# Patient Record
Sex: FEMALE | Race: BLACK OR AFRICAN AMERICAN | NOT HISPANIC OR LATINO | Employment: FULL TIME | URBAN - NONMETROPOLITAN AREA
[De-identification: names, ages, dates, MRNs, and addresses within clinical notes are randomized per-mention and may not be internally consistent; named-entity substitution may affect disease eponyms.]

---

## 2017-01-27 ENCOUNTER — TELEPHONE (OUTPATIENT)
Dept: FAMILY MEDICINE CLINIC | Facility: CLINIC | Age: 32
End: 2017-01-27

## 2017-01-27 ENCOUNTER — OFFICE VISIT (OUTPATIENT)
Dept: FAMILY MEDICINE CLINIC | Facility: CLINIC | Age: 32
End: 2017-01-27

## 2017-01-27 ENCOUNTER — LAB (OUTPATIENT)
Dept: LAB | Facility: HOSPITAL | Age: 32
End: 2017-01-27

## 2017-01-27 VITALS
SYSTOLIC BLOOD PRESSURE: 120 MMHG | BODY MASS INDEX: 22.5 KG/M2 | WEIGHT: 140 LBS | DIASTOLIC BLOOD PRESSURE: 74 MMHG | HEIGHT: 66 IN

## 2017-01-27 DIAGNOSIS — Z34.90 PREGNANCY, UNSPECIFIED GESTATIONAL AGE: Primary | ICD-10-CM

## 2017-01-27 DIAGNOSIS — N92.6 MISSED MENSES: Primary | ICD-10-CM

## 2017-01-27 DIAGNOSIS — F41.9 ANXIETY: ICD-10-CM

## 2017-01-27 DIAGNOSIS — N92.6 MISSED MENSES: ICD-10-CM

## 2017-01-27 DIAGNOSIS — M79.7 PRIMARY FIBROMYALGIA: ICD-10-CM

## 2017-01-27 LAB — B-HCG UR QL: POSITIVE

## 2017-01-27 PROCEDURE — 99213 OFFICE O/P EST LOW 20 MIN: CPT | Performed by: FAMILY MEDICINE

## 2017-01-27 PROCEDURE — 81025 URINE PREGNANCY TEST: CPT

## 2017-01-27 NOTE — MR AVS SNAPSHOT
Ligia Hay   1/27/2017 10:30 AM   Office Visit    Dept Phone:  [phone]   Encounter #:  [id number]    Provider:  Ezequiel Aguilar MD   Department:  [facility] FAMILY MEDICINE                Your Full Care Plan              Your Updated Medication List          This list is accurate as of: 1/27/17 11:12 AM.  Always use your most recent med list.                clonazePAM 0.5 MG tablet   Commonly known as:  KlonoPIN   Take 1 tablet by mouth 2 (Two) Times a Day As Needed for seizures. Do not take while driving or operating machinery       cyclobenzaprine 10 MG tablet   Commonly known as:  FLEXERIL   Take 1 tablet by mouth every night.       diclofenac 50 MG EC tablet   Commonly known as:  VOLTAREN       FLUoxetine 20 MG capsule   Commonly known as:  PROzac   Take 1 capsule by mouth daily.       guaiFENesin 600 MG 12 hr tablet   Commonly known as:  MUCINEX   Take 2 tablets by mouth 2 (two) times a day.       pantoprazole 40 MG EC tablet   Commonly known as:  PROTONIX   Take 1 tablet by mouth daily.       pregabalin 150 MG capsule   Commonly known as:  LYRICA   Take 1 capsule by mouth 2 (Two) Times a Day.       PRENATAL VITAMINS PO               You Were Diagnosed With        Codes Comments    Missed menses    -  Primary ICD-10-CM: N92.6  ICD-9-CM: 626.4     Primary fibromyalgia     ICD-10-CM: M79.7  ICD-9-CM: 729.1     Anxiety     ICD-10-CM: F41.9  ICD-9-CM: 300.00       Instructions     None    Patient Instructions History      Upcoming Appointments     Visit Type Date Time Department    OFFICE VISIT 1/27/2017 10:30 AM [facility] FAM MED [facility] 4TH    OFFICE VISIT 1/31/2017 10:30 AM [facility] GASTROENT  MAD    OFFICE VISIT 4/28/2017  2:45 PM [facility] FAM [facility] 4TH      MyChart Signup     Our records indicate that you have declined [facility] signup. If you would like to sign up for [facility], please email [email or web address] or call [phone] to obtain an  "activation code.             Other Info from Your Visit           Your Appointments     Jan 31, 2017 10:30 AM CST   Office Visit with CATHERINE Maguire   [facility] GASTROENTEROLOGY (--)    [address]  [address] [facility] [address] [phone]           Arrive 15 minutes prior to appointment.            Apr 28, 2017  2:45 PM CDT   Office Visit with Ezequiel Aguilra MD   [facility] FAMILY MEDICINE (--)    [address]  [address] [place] [id number] [phone]           Arrive 15 minutes prior to appointment.              Allergies     Adhesive Tape      Fish-derived Products        Reason for Visit     Fibromyalgia fibromyalgia      Vital Signs     Blood Pressure Height Weight Body Mass Index Smoking Status       120/74 66\" (167.6 cm) 140 lb (63.5 kg) 22.6 kg/m2 Current Every Day Smoker       Problems and Diagnoses Noted     Anxiety problem    Primary fibromyalgia    Missed menses    -  Primary        "

## 2017-01-27 NOTE — PROGRESS NOTES
She is pregnant.  Have her stop all of her current medicines and DC them from the med list.  We'll refer her to an obstetrician.

## 2017-01-27 NOTE — PROGRESS NOTES
Subjective   Ligia Hay is a 31 y.o. female.     Fibromyalgia   This is a chronic problem. The current episode started more than 1 year ago. The problem has been waxing and waning. Associated symptoms include coughing. Pertinent negatives include no chest pain.   Anxiety   Presents for follow-up visit. Symptoms include irritability, malaise and shortness of breath. Patient reports no chest pain, depressed mood, excessive worry, hyperventilation, insomnia, nervous/anxious behavior, palpitations or panic. Symptoms occur constantly.            The following portions of the patient's history were reviewed and updated as appropriate: allergies, current medications, past family history, past medical history, past social history, past surgical history and problem list.    Review of Systems   Constitutional: Positive for irritability.   Respiratory: Positive for cough, shortness of breath and wheezing.    Cardiovascular: Negative for chest pain and palpitations.   Genitourinary: Positive for frequency. Negative for dysuria. Menstrual problem: late on period.   Psychiatric/Behavioral: The patient is not nervous/anxious and does not have insomnia.        Objective   Physical Exam   Constitutional: She is oriented to person, place, and time. She appears well-developed and well-nourished. No distress.   HENT:   Head: Normocephalic and atraumatic.   Neurological: She is alert and oriented to person, place, and time.   Skin: Skin is warm and dry. She is not diaphoretic.   Psychiatric: She has a normal mood and affect. Her speech is normal and behavior is normal. Judgment and thought content normal. Her mood appears not anxious. Her affect is not angry, not blunt, not labile and not inappropriate. Cognition and memory are normal. She does not exhibit a depressed mood.   Nursing note and vitals reviewed.      Assessment/Plan   Problems Addressed this Visit        Musculoskeletal and Integument    Primary fibromyalgia        Other    Anxiety      Other Visit Diagnoses     Missed menses    -  Primary    Relevant Orders    Pregnancy, Urine

## 2017-01-28 NOTE — PROGRESS NOTES
RESULTS & RECOMMENDATIONS RELAYED, Referral Sent, Med List Cleared except for Prenatal Vitamins.  Patient was advised to stop all current medications.

## 2017-01-28 NOTE — TELEPHONE ENCOUNTER
-Test results & recommendations relayed.   Referral sent to OB/GYN  Med List Cleared.    ---- Message from Ezequiel Aguilar MD sent at 1/27/2017  3:23 PM CST -----  She is pregnant.  Have her stop all of her current medicines and DC them from the med list.  We'll refer her to an obstetrician.

## 2017-02-07 ENCOUNTER — INITIAL PRENATAL (OUTPATIENT)
Dept: OBSTETRICS AND GYNECOLOGY | Facility: CLINIC | Age: 32
End: 2017-02-07

## 2017-02-07 ENCOUNTER — APPOINTMENT (OUTPATIENT)
Dept: LAB | Facility: HOSPITAL | Age: 32
End: 2017-02-07

## 2017-02-07 VITALS — SYSTOLIC BLOOD PRESSURE: 123 MMHG | DIASTOLIC BLOOD PRESSURE: 80 MMHG | BODY MASS INDEX: 22.6 KG/M2 | WEIGHT: 140 LBS

## 2017-02-07 DIAGNOSIS — F41.9 ANXIETY IN PREGNANCY IN FIRST TRIMESTER, ANTEPARTUM: ICD-10-CM

## 2017-02-07 DIAGNOSIS — Z3A.00 WEEKS OF GESTATION OF PREGNANCY NOT SPECIFIED: ICD-10-CM

## 2017-02-07 DIAGNOSIS — Z32.01 PREGNANCY EXAMINATION OR TEST, POSITIVE RESULT: Primary | ICD-10-CM

## 2017-02-07 DIAGNOSIS — O99.341 DEPRESSION AFFECTING PREGNANCY IN FIRST TRIMESTER, ANTEPARTUM: ICD-10-CM

## 2017-02-07 DIAGNOSIS — F32.A DEPRESSION AFFECTING PREGNANCY IN FIRST TRIMESTER, ANTEPARTUM: ICD-10-CM

## 2017-02-07 DIAGNOSIS — Z34.01 ENCOUNTER FOR SUPERVISION OF NORMAL FIRST PREGNANCY IN FIRST TRIMESTER: ICD-10-CM

## 2017-02-07 DIAGNOSIS — O99.341 ANXIETY IN PREGNANCY IN FIRST TRIMESTER, ANTEPARTUM: ICD-10-CM

## 2017-02-07 LAB
ABO GROUP BLD: NORMAL
AMPHET+METHAMPHET UR QL: NEGATIVE
BARBITURATES UR QL SCN: NEGATIVE
BASOPHILS # BLD AUTO: 0.02 10*3/MM3 (ref 0–0.2)
BASOPHILS NFR BLD AUTO: 0.2 % (ref 0–2)
BENZODIAZ UR QL SCN: NEGATIVE
BILIRUB UR QL STRIP: NEGATIVE
BLD GP AB SCN SERPL QL: NEGATIVE
CANDIDA ALBICANS: POSITIVE
CANNABINOIDS SERPL QL: NEGATIVE
CLARITY UR: CLEAR
COCAINE UR QL: NEGATIVE
COLOR UR: YELLOW
DEPRECATED RDW RBC AUTO: 39.1 FL (ref 36.4–46.3)
EOSINOPHIL # BLD AUTO: 0.11 10*3/MM3 (ref 0–0.7)
EOSINOPHIL NFR BLD AUTO: 1.1 % (ref 0–7)
ERYTHROCYTE [DISTWIDTH] IN BLOOD BY AUTOMATED COUNT: 11.7 % (ref 11.5–14.5)
GARDNERELLA VAGINALIS: NEGATIVE
GLUCOSE UR STRIP-MCNC: NEGATIVE MG/DL
HBV SURFACE AG SERPL QL IA: NEGATIVE
HCT VFR BLD AUTO: 36.2 % (ref 35–45)
HCV AB SER DONR QL: NEGATIVE
HCV S/C RATIO: 0.02 (ref 0–0.89)
HGB BLD-MCNC: 12.8 G/DL (ref 12–15.5)
HGB UR QL STRIP.AUTO: NEGATIVE
HIV1+2 AB SER QL: NEGATIVE
IMM GRANULOCYTES # BLD: 0.02 10*3/MM3 (ref 0–0.02)
IMM GRANULOCYTES NFR BLD: 0.2 % (ref 0–0.5)
KETONES UR QL STRIP: NEGATIVE
LEUKOCYTE ESTERASE UR QL STRIP.AUTO: NEGATIVE
LYMPHOCYTES # BLD AUTO: 3.53 10*3/MM3 (ref 0.6–4.2)
LYMPHOCYTES NFR BLD AUTO: 34.2 % (ref 10–50)
MCH RBC QN AUTO: 32 PG (ref 26.5–34)
MCHC RBC AUTO-ENTMCNC: 35.4 G/DL (ref 31.4–36)
MCV RBC AUTO: 90.5 FL (ref 80–98)
METHADONE UR QL SCN: NEGATIVE
MONOCYTES # BLD AUTO: 0.71 10*3/MM3 (ref 0–0.9)
MONOCYTES NFR BLD AUTO: 6.9 % (ref 0–12)
NEUTROPHILS # BLD AUTO: 5.94 10*3/MM3 (ref 2–8.6)
NEUTROPHILS NFR BLD AUTO: 57.4 % (ref 37–80)
NITRITE UR QL STRIP: NEGATIVE
OPIATES UR QL: NEGATIVE
OXYCODONE UR QL SCN: NEGATIVE
PH UR STRIP.AUTO: 7 [PH] (ref 5–9)
PLATELET # BLD AUTO: 301 10*3/MM3 (ref 150–450)
PMV BLD AUTO: 10.1 FL (ref 8–12)
PROT UR QL STRIP: NEGATIVE
RBC # BLD AUTO: 4 10*6/MM3 (ref 3.77–5.16)
RH BLD: POSITIVE
RUBV IGG SER QL: ABNORMAL
RUBV IGG SER-ACNC: 308 IU/ML (ref 0–9.9)
SP GR UR STRIP: 1.01 (ref 1–1.03)
TRICHOMONAS VAGINALIS PCR: NEGATIVE
UROBILINOGEN UR QL STRIP: NORMAL
WBC NRBC COR # BLD: 10.33 10*3/MM3 (ref 3.2–9.8)

## 2017-02-07 PROCEDURE — 87086 URINE CULTURE/COLONY COUNT: CPT | Performed by: ADVANCED PRACTICE MIDWIFE

## 2017-02-07 PROCEDURE — 36415 COLL VENOUS BLD VENIPUNCTURE: CPT | Performed by: ADVANCED PRACTICE MIDWIFE

## 2017-02-07 PROCEDURE — 80307 DRUG TEST PRSMV CHEM ANLYZR: CPT | Performed by: ADVANCED PRACTICE MIDWIFE

## 2017-02-07 PROCEDURE — 85025 COMPLETE CBC W/AUTO DIFF WBC: CPT | Performed by: ADVANCED PRACTICE MIDWIFE

## 2017-02-07 PROCEDURE — 88142 CYTOPATH C/V THIN LAYER: CPT | Performed by: PATHOLOGY

## 2017-02-07 PROCEDURE — 88142 CYTOPATH C/V THIN LAYER: CPT | Performed by: ADVANCED PRACTICE MIDWIFE

## 2017-02-07 PROCEDURE — 88141 CYTOPATH C/V INTERPRET: CPT | Performed by: PATHOLOGY

## 2017-02-07 PROCEDURE — 87340 HEPATITIS B SURFACE AG IA: CPT | Performed by: ADVANCED PRACTICE MIDWIFE

## 2017-02-07 PROCEDURE — 81003 URINALYSIS AUTO W/O SCOPE: CPT | Performed by: ADVANCED PRACTICE MIDWIFE

## 2017-02-07 PROCEDURE — 0501F PRENATAL FLOW SHEET: CPT | Performed by: ADVANCED PRACTICE MIDWIFE

## 2017-02-07 PROCEDURE — 87512 GARDNER VAG DNA QUANT: CPT | Performed by: ADVANCED PRACTICE MIDWIFE

## 2017-02-07 PROCEDURE — 86803 HEPATITIS C AB TEST: CPT | Performed by: ADVANCED PRACTICE MIDWIFE

## 2017-02-07 PROCEDURE — 87661 TRICHOMONAS VAGINALIS AMPLIF: CPT | Performed by: ADVANCED PRACTICE MIDWIFE

## 2017-02-07 PROCEDURE — 87624 HPV HI-RISK TYP POOLED RSLT: CPT | Performed by: ADVANCED PRACTICE MIDWIFE

## 2017-02-07 PROCEDURE — 87481 CANDIDA DNA AMP PROBE: CPT | Performed by: ADVANCED PRACTICE MIDWIFE

## 2017-02-07 PROCEDURE — 86592 SYPHILIS TEST NON-TREP QUAL: CPT | Performed by: ADVANCED PRACTICE MIDWIFE

## 2017-02-07 PROCEDURE — G0432 EIA HIV-1/HIV-2 SCREEN: HCPCS | Performed by: ADVANCED PRACTICE MIDWIFE

## 2017-02-07 PROCEDURE — 87800 DETECT AGNT MULT DNA DIREC: CPT | Performed by: ADVANCED PRACTICE MIDWIFE

## 2017-02-07 RX ORDER — ALBUTEROL SULFATE 90 UG/1
2 AEROSOL, METERED RESPIRATORY (INHALATION) EVERY 6 HOURS PRN
Qty: 1 INHALER | Refills: 5 | Status: SHIPPED | OUTPATIENT
Start: 2017-02-07 | End: 2018-01-03

## 2017-02-07 RX ORDER — PROMETHAZINE HYDROCHLORIDE 12.5 MG/1
12.5 TABLET ORAL EVERY 6 HOURS PRN
Qty: 20 TABLET | Refills: 3 | Status: SHIPPED | OUTPATIENT
Start: 2017-02-07 | End: 2017-04-28

## 2017-02-07 RX ORDER — ONDANSETRON 4 MG/1
4 TABLET, FILM COATED ORAL EVERY 8 HOURS PRN
Qty: 20 TABLET | Refills: 3 | Status: SHIPPED | OUTPATIENT
Start: 2017-02-07 | End: 2017-08-31

## 2017-02-07 NOTE — PROGRESS NOTES
New Ob, new ob labs done, pap collected, vaginal panel collected.  See HX tab, see prenatal physical tab   ROS: + N/V, -fever, - HAs, - SOB, - upper epigastric pain.     Anxiety and depression- managed by PCP, not on medications, reviewed s/s to report, pt verbalized understanding    Spent 30 minutes out of 45 minutes face to face counseling on nutrition, activity, diet, safety, prenatal care, medications approved in pregnancy, pregnancy discomforts, and testing. US scheduled for 1 week

## 2017-02-08 LAB
C TRACH RRNA CVX QL NAA+PROBE: NOT DETECTED
N GONORRHOEA RRNA SPEC QL NAA+PROBE: NOT DETECTED
RPR SER QL: NORMAL

## 2017-02-09 LAB — BACTERIA SPEC AEROBE CULT: NORMAL

## 2017-02-10 LAB
LAB AP CASE REPORT: ABNORMAL
LAB AP GYN ADDITIONAL INFORMATION: ABNORMAL
LAB AP GYN OTHER FINDINGS: ABNORMAL
Lab: ABNORMAL
PATH INTERP SPEC-IMP: ABNORMAL
STAT OF ADQ CVX/VAG CYTO-IMP: ABNORMAL

## 2017-02-15 DIAGNOSIS — R87.612 LOW GRADE SQUAMOUS INTRAEPITHELIAL LESION ON CYTOLOGIC SMEAR OF CERVIX (LGSIL): Primary | ICD-10-CM

## 2017-02-15 DIAGNOSIS — R87.612 LOW GRADE SQUAMOUS INTRAEPITHELIAL LESION ON CYTOLOGIC SMEAR OF CERVIX (LGSIL): ICD-10-CM

## 2017-02-15 DIAGNOSIS — R85.619 ABNORMAL PAP SMEAR OF ANUS: Primary | ICD-10-CM

## 2017-02-16 ENCOUNTER — ROUTINE PRENATAL (OUTPATIENT)
Dept: OBSTETRICS AND GYNECOLOGY | Facility: CLINIC | Age: 32
End: 2017-02-16

## 2017-02-16 VITALS — WEIGHT: 139 LBS | DIASTOLIC BLOOD PRESSURE: 83 MMHG | SYSTOLIC BLOOD PRESSURE: 118 MMHG | BODY MASS INDEX: 22.44 KG/M2

## 2017-02-16 DIAGNOSIS — O99.341 DEPRESSION AFFECTING PREGNANCY IN FIRST TRIMESTER, ANTEPARTUM: Primary | ICD-10-CM

## 2017-02-16 DIAGNOSIS — F32.A DEPRESSION AFFECTING PREGNANCY IN FIRST TRIMESTER, ANTEPARTUM: Primary | ICD-10-CM

## 2017-02-16 DIAGNOSIS — Z3A.00 WEEKS OF GESTATION OF PREGNANCY NOT SPECIFIED: ICD-10-CM

## 2017-02-16 PROCEDURE — 0502F SUBSEQUENT PRENATAL CARE: CPT | Performed by: ADVANCED PRACTICE MIDWIFE

## 2017-02-16 NOTE — PROGRESS NOTES
CC: KYM visit, history reviewed see history tabs. Patient had a redbull prior to appt.     ROS: Negative leaking fluid from the vagina, swelling in her legs, headache, visual changes and low back pain    Educated on: nutrition    Plan: f/u in ASAP with  Friday for colposcopy week/s per patient's desires, patient requested work excuse for lifting restrictions, note given stating no lifting more than 20 pounds, frequent bathroom breaks, and frequent snack breaks.

## 2017-02-17 DIAGNOSIS — Z03.79 OTHER SUSPECTED MATERNAL AND FETAL CONDITION NOT FOUND: Primary | ICD-10-CM

## 2017-02-20 LAB — HPV I/H RISK 4 DNA CVX QL PROBE+SIG AMP: POSITIVE

## 2017-03-06 ENCOUNTER — PROCEDURE VISIT (OUTPATIENT)
Dept: OBSTETRICS AND GYNECOLOGY | Facility: CLINIC | Age: 32
End: 2017-03-06

## 2017-03-06 VITALS
HEIGHT: 66 IN | WEIGHT: 137 LBS | DIASTOLIC BLOOD PRESSURE: 80 MMHG | SYSTOLIC BLOOD PRESSURE: 122 MMHG | BODY MASS INDEX: 22.02 KG/M2

## 2017-03-06 DIAGNOSIS — Z33.1 INCIDENTAL PREGNANCY: ICD-10-CM

## 2017-03-06 DIAGNOSIS — R87.612 LGSIL ON PAP SMEAR OF CERVIX: Primary | ICD-10-CM

## 2017-03-06 PROCEDURE — 57452 EXAM OF CERVIX W/SCOPE: CPT | Performed by: OBSTETRICS & GYNECOLOGY

## 2017-03-06 NOTE — PROGRESS NOTES
Colposcopy    Date of procedure:  3/6/2017    Risks and benefits discussed? yes  All questions answered? yes  Consents given by the patient  Written consent obtained? yes    Local anesthesia used:  no    Pre-op indication: LGSIL - mild dysplasia  Procedure documentation:    The cervix was next bathed in acetic acid. The cervix was  viewed colposcopically with white and green light.     The findings were as follows:      The transformation zone was able to be seen adequately.    Acetowhite noted at 4 o'clock    Ectocervical biopsies were not obtained..    An ECC was not performed.      Colposcopic Impression: 1. Adequate colposcopy  2. Colposcopic findings are consistent with PAP  3. Incidental pregnancy        Plan: Repap at 28 weeks      This note was electronically signed.  Celso Aguirre M.D.  March 6, 2017

## 2017-03-21 ENCOUNTER — ROUTINE PRENATAL (OUTPATIENT)
Dept: OBSTETRICS AND GYNECOLOGY | Facility: CLINIC | Age: 32
End: 2017-03-21

## 2017-03-21 VITALS — SYSTOLIC BLOOD PRESSURE: 115 MMHG | BODY MASS INDEX: 22.27 KG/M2 | DIASTOLIC BLOOD PRESSURE: 76 MMHG | WEIGHT: 138 LBS

## 2017-03-21 DIAGNOSIS — Z3A.14 14 WEEKS GESTATION OF PREGNANCY: ICD-10-CM

## 2017-03-21 DIAGNOSIS — O99.342 DEPRESSION AFFECTING PREGNANCY IN SECOND TRIMESTER, ANTEPARTUM: Primary | ICD-10-CM

## 2017-03-21 DIAGNOSIS — N89.8 VAGINAL PRURITUS: ICD-10-CM

## 2017-03-21 DIAGNOSIS — Z36.89 ENCOUNTER FOR FETAL ANATOMIC SURVEY: ICD-10-CM

## 2017-03-21 DIAGNOSIS — R87.612 LGSIL ON PAP SMEAR OF CERVIX: ICD-10-CM

## 2017-03-21 DIAGNOSIS — F32.A DEPRESSION AFFECTING PREGNANCY IN SECOND TRIMESTER, ANTEPARTUM: Primary | ICD-10-CM

## 2017-03-21 LAB
BACTERIA UR QL AUTO: ABNORMAL /HPF
BILIRUB UR QL STRIP: ABNORMAL
CANDIDA ALBICANS: POSITIVE
CLARITY UR: CLEAR
COLOR UR: ABNORMAL
GARDNERELLA VAGINALIS: NEGATIVE
GLUCOSE UR STRIP-MCNC: NEGATIVE MG/DL
HGB UR QL STRIP.AUTO: NEGATIVE
HYALINE CASTS UR QL AUTO: ABNORMAL /LPF
KETONES UR QL STRIP: NEGATIVE
LEUKOCYTE ESTERASE UR QL STRIP.AUTO: NEGATIVE
NITRITE UR QL STRIP: NEGATIVE
PH UR STRIP.AUTO: 6.5 [PH] (ref 5–9)
PROT UR QL STRIP: ABNORMAL
RBC # UR: ABNORMAL /HPF
REF LAB TEST METHOD: ABNORMAL
SP GR UR STRIP: 1.03 (ref 1–1.03)
SQUAMOUS #/AREA URNS HPF: ABNORMAL /HPF
TRICHOMONAS VAGINALIS PCR: NEGATIVE
UROBILINOGEN UR QL STRIP: ABNORMAL
WBC UR QL AUTO: ABNORMAL /HPF

## 2017-03-21 PROCEDURE — 87660 TRICHOMONAS VAGIN DIR PROBE: CPT | Performed by: ADVANCED PRACTICE MIDWIFE

## 2017-03-21 PROCEDURE — 0502F SUBSEQUENT PRENATAL CARE: CPT | Performed by: ADVANCED PRACTICE MIDWIFE

## 2017-03-21 PROCEDURE — 87510 GARDNER VAG DNA DIR PROBE: CPT | Performed by: ADVANCED PRACTICE MIDWIFE

## 2017-03-21 PROCEDURE — 87086 URINE CULTURE/COLONY COUNT: CPT | Performed by: ADVANCED PRACTICE MIDWIFE

## 2017-03-21 PROCEDURE — 81001 URINALYSIS AUTO W/SCOPE: CPT | Performed by: ADVANCED PRACTICE MIDWIFE

## 2017-03-21 PROCEDURE — 87480 CANDIDA DNA DIR PROBE: CPT | Performed by: ADVANCED PRACTICE MIDWIFE

## 2017-03-21 NOTE — PROGRESS NOTES
Needs repeat pap at 28 weeks , CF and QUAD next appt    CC: KYM visit, history reviewed see history tabs.     ROS:Positive vaginal itching   Negative leaking fluid from the vagina, swelling in her legs, headache, visual changes, low back pain and heartburn    Educated on:POC     Plan: f/u in 4 week/s US with appt after

## 2017-03-23 LAB — BACTERIA SPEC AEROBE CULT: NORMAL

## 2017-04-18 ENCOUNTER — ROUTINE PRENATAL (OUTPATIENT)
Dept: OBSTETRICS AND GYNECOLOGY | Facility: CLINIC | Age: 32
End: 2017-04-18

## 2017-04-18 ENCOUNTER — APPOINTMENT (OUTPATIENT)
Dept: LAB | Facility: HOSPITAL | Age: 32
End: 2017-04-18

## 2017-04-18 VITALS — DIASTOLIC BLOOD PRESSURE: 75 MMHG | BODY MASS INDEX: 22.92 KG/M2 | WEIGHT: 142 LBS | SYSTOLIC BLOOD PRESSURE: 112 MMHG

## 2017-04-18 DIAGNOSIS — O99.342 DEPRESSION AFFECTING PREGNANCY IN SECOND TRIMESTER, ANTEPARTUM: ICD-10-CM

## 2017-04-18 DIAGNOSIS — Z36.9 PRENATAL SCREENING ENCOUNTER: Primary | ICD-10-CM

## 2017-04-18 DIAGNOSIS — Z3A.18 18 WEEKS GESTATION OF PREGNANCY: ICD-10-CM

## 2017-04-18 DIAGNOSIS — F32.A DEPRESSION AFFECTING PREGNANCY IN SECOND TRIMESTER, ANTEPARTUM: ICD-10-CM

## 2017-04-18 PROCEDURE — 82105 ALPHA-FETOPROTEIN SERUM: CPT | Performed by: NURSE PRACTITIONER

## 2017-04-18 PROCEDURE — 81220 CFTR GENE COM VARIANTS: CPT | Performed by: NURSE PRACTITIONER

## 2017-04-18 PROCEDURE — 86336 INHIBIN A: CPT | Performed by: NURSE PRACTITIONER

## 2017-04-18 PROCEDURE — 82677 ASSAY OF ESTRIOL: CPT | Performed by: NURSE PRACTITIONER

## 2017-04-18 PROCEDURE — 36415 COLL VENOUS BLD VENIPUNCTURE: CPT | Performed by: NURSE PRACTITIONER

## 2017-04-18 PROCEDURE — 0502F SUBSEQUENT PRENATAL CARE: CPT | Performed by: NURSE PRACTITIONER

## 2017-04-18 PROCEDURE — 84702 CHORIONIC GONADOTROPIN TEST: CPT | Performed by: NURSE PRACTITIONER

## 2017-04-18 NOTE — PROGRESS NOTES
Chief Complaint   Patient presents with   • Routine Prenatal Visit   • Pregnancy Ultrasound     Anatomy       The patient complains of the following: No complaints    ROS  Vaginal bleeding: No   Nausea: No   Diarrhea: No   Constipation: No   Other:      Lab Results   Component Value Date    ABO A 02/07/2017    RH Positive 02/07/2017    ABSCRN Negative 02/07/2017       Specific topics discussed at today's visit:Anatomy scan results, testing for CF and quad screen  Tests done today: U/S for anatomic screening - anatomy completely seen today  Quad screen  CF  Tests to be done at the next visit: none    Ligia was seen today for routine prenatal visit and pregnancy ultrasound.    Diagnoses and all orders for this visit:    Prenatal screening encounter  -     Maternal Screen 4  -     Cystic Fibrosis Gene Test    Depression affecting pregnancy in second trimester, antepartum    18 weeks gestation of pregnancy

## 2017-04-19 ENCOUNTER — APPOINTMENT (OUTPATIENT)
Dept: GENERAL RADIOLOGY | Facility: HOSPITAL | Age: 32
End: 2017-04-19

## 2017-04-19 ENCOUNTER — HOSPITAL ENCOUNTER (EMERGENCY)
Facility: HOSPITAL | Age: 32
Discharge: HOME | End: 2017-04-19
Attending: EMERGENCY MEDICINE | Admitting: EMERGENCY MEDICINE

## 2017-04-19 VITALS
DIASTOLIC BLOOD PRESSURE: 64 MMHG | WEIGHT: 142 LBS | HEART RATE: 95 BPM | OXYGEN SATURATION: 100 % | TEMPERATURE: 98.3 F | SYSTOLIC BLOOD PRESSURE: 107 MMHG | BODY MASS INDEX: 22.82 KG/M2 | RESPIRATION RATE: 16 BRPM | HEIGHT: 66 IN

## 2017-04-19 DIAGNOSIS — N39.0 UTI (URINARY TRACT INFECTION), UNCOMPLICATED: ICD-10-CM

## 2017-04-19 DIAGNOSIS — J45.901 ASTHMA EXACERBATION: Primary | ICD-10-CM

## 2017-04-19 DIAGNOSIS — Z3A.18 18 WEEKS GESTATION OF PREGNANCY: ICD-10-CM

## 2017-04-19 LAB
ANION GAP SERPL CALCULATED.3IONS-SCNC: 11 MMOL/L (ref 5–15)
BACTERIA UR QL AUTO: ABNORMAL /HPF
BASOPHILS # BLD AUTO: 0.01 10*3/MM3 (ref 0–0.2)
BASOPHILS NFR BLD AUTO: 0.1 % (ref 0–2)
BILIRUB UR QL STRIP: NEGATIVE
BUN BLD-MCNC: 9 MG/DL (ref 7–21)
BUN/CREAT SERPL: 16.4 (ref 7–25)
CALCIUM SPEC-SCNC: 9.2 MG/DL (ref 8.4–10.2)
CHLORIDE SERPL-SCNC: 102 MMOL/L (ref 95–110)
CLARITY UR: CLEAR
CO2 SERPL-SCNC: 22 MMOL/L (ref 22–31)
COLOR UR: YELLOW
CREAT BLD-MCNC: 0.55 MG/DL (ref 0.5–1)
DEPRECATED RDW RBC AUTO: 41.4 FL (ref 36.4–46.3)
EOSINOPHIL # BLD AUTO: 0.02 10*3/MM3 (ref 0–0.7)
EOSINOPHIL NFR BLD AUTO: 0.2 % (ref 0–7)
ERYTHROCYTE [DISTWIDTH] IN BLOOD BY AUTOMATED COUNT: 12.4 % (ref 11.5–14.5)
GFR SERPL CREATININE-BSD FRML MDRD: 157 ML/MIN/1.73 (ref 64–149)
GLUCOSE BLD-MCNC: 125 MG/DL (ref 60–100)
GLUCOSE UR STRIP-MCNC: ABNORMAL MG/DL
HCT VFR BLD AUTO: 34.7 % (ref 35–45)
HGB BLD-MCNC: 12.4 G/DL (ref 12–15.5)
HGB UR QL STRIP.AUTO: NEGATIVE
HOLD SPECIMEN: NORMAL
HOLD SPECIMEN: NORMAL
HYALINE CASTS UR QL AUTO: ABNORMAL /LPF
IMM GRANULOCYTES # BLD: 0.03 10*3/MM3 (ref 0–0.02)
IMM GRANULOCYTES NFR BLD: 0.3 % (ref 0–0.5)
KETONES UR QL STRIP: NEGATIVE
LEUKOCYTE ESTERASE UR QL STRIP.AUTO: ABNORMAL
LYMPHOCYTES # BLD AUTO: 1.28 10*3/MM3 (ref 0.6–4.2)
LYMPHOCYTES NFR BLD AUTO: 12 % (ref 10–50)
MCH RBC QN AUTO: 32.5 PG (ref 26.5–34)
MCHC RBC AUTO-ENTMCNC: 35.7 G/DL (ref 31.4–36)
MCV RBC AUTO: 90.8 FL (ref 80–98)
MONOCYTES # BLD AUTO: 0.49 10*3/MM3 (ref 0–0.9)
MONOCYTES NFR BLD AUTO: 4.6 % (ref 0–12)
MUCOUS THREADS URNS QL MICRO: ABNORMAL /HPF
NEUTROPHILS # BLD AUTO: 8.84 10*3/MM3 (ref 2–8.6)
NEUTROPHILS NFR BLD AUTO: 82.8 % (ref 37–80)
NITRITE UR QL STRIP: NEGATIVE
PH UR STRIP.AUTO: 6.5 [PH] (ref 5–9)
PLATELET # BLD AUTO: 251 10*3/MM3 (ref 150–450)
PMV BLD AUTO: 10.5 FL (ref 8–12)
POTASSIUM BLD-SCNC: 4 MMOL/L (ref 3.5–5.1)
PROT UR QL STRIP: NEGATIVE
RBC # BLD AUTO: 3.82 10*6/MM3 (ref 3.77–5.16)
RBC # UR: ABNORMAL /HPF
REF LAB TEST METHOD: ABNORMAL
SODIUM BLD-SCNC: 135 MMOL/L (ref 137–145)
SP GR UR STRIP: 1.02 (ref 1–1.03)
SQUAMOUS #/AREA URNS HPF: ABNORMAL /HPF
UROBILINOGEN UR QL STRIP: ABNORMAL
WBC NRBC COR # BLD: 10.67 10*3/MM3 (ref 3.2–9.8)
WBC UR QL AUTO: ABNORMAL /HPF
WHOLE BLOOD HOLD SPECIMEN: NORMAL
WHOLE BLOOD HOLD SPECIMEN: NORMAL

## 2017-04-19 PROCEDURE — 93010 ELECTROCARDIOGRAM REPORT: CPT | Performed by: INTERNAL MEDICINE

## 2017-04-19 PROCEDURE — 25010000002 CEFTRIAXONE: Performed by: EMERGENCY MEDICINE

## 2017-04-19 PROCEDURE — 80048 BASIC METABOLIC PNL TOTAL CA: CPT | Performed by: EMERGENCY MEDICINE

## 2017-04-19 PROCEDURE — 87086 URINE CULTURE/COLONY COUNT: CPT | Performed by: EMERGENCY MEDICINE

## 2017-04-19 PROCEDURE — 81001 URINALYSIS AUTO W/SCOPE: CPT | Performed by: EMERGENCY MEDICINE

## 2017-04-19 PROCEDURE — 85025 COMPLETE CBC W/AUTO DIFF WBC: CPT | Performed by: EMERGENCY MEDICINE

## 2017-04-19 PROCEDURE — 96365 THER/PROPH/DIAG IV INF INIT: CPT

## 2017-04-19 PROCEDURE — 71020 HC CHEST PA AND LATERAL: CPT

## 2017-04-19 PROCEDURE — 99284 EMERGENCY DEPT VISIT MOD MDM: CPT

## 2017-04-19 PROCEDURE — 93005 ELECTROCARDIOGRAM TRACING: CPT | Performed by: EMERGENCY MEDICINE

## 2017-04-19 RX ORDER — NITROFURANTOIN 25; 75 MG/1; MG/1
100 CAPSULE ORAL 2 TIMES DAILY
Qty: 14 CAPSULE | Refills: 0 | Status: SHIPPED | OUTPATIENT
Start: 2017-04-19 | End: 2017-04-26

## 2017-04-19 RX ORDER — ALBUTEROL SULFATE 2.5 MG/3ML
2.5 SOLUTION RESPIRATORY (INHALATION) ONCE
Status: COMPLETED | OUTPATIENT
Start: 2017-04-19 | End: 2017-04-19

## 2017-04-19 RX ORDER — SODIUM CHLORIDE 0.9 % (FLUSH) 0.9 %
10 SYRINGE (ML) INJECTION AS NEEDED
Status: DISCONTINUED | OUTPATIENT
Start: 2017-04-19 | End: 2017-04-19 | Stop reason: HOSPADM

## 2017-04-19 RX ORDER — FLUTICASONE PROPIONATE 44 UG/1
2 AEROSOL, METERED RESPIRATORY (INHALATION)
Qty: 1 INHALER | Refills: 0 | Status: SHIPPED | OUTPATIENT
Start: 2017-04-19 | End: 2017-04-28

## 2017-04-19 RX ORDER — ALBUTEROL SULFATE 90 UG/1
2 AEROSOL, METERED RESPIRATORY (INHALATION) EVERY 4 HOURS PRN
Qty: 1 INHALER | Refills: 0 | Status: SHIPPED | OUTPATIENT
Start: 2017-04-19 | End: 2017-04-28 | Stop reason: SDUPTHER

## 2017-04-19 RX ORDER — PREDNISONE 20 MG/1
40 TABLET ORAL DAILY
Qty: 14 TABLET | Refills: 0 | Status: SHIPPED | OUTPATIENT
Start: 2017-04-19 | End: 2017-04-26

## 2017-04-19 RX ADMIN — ALBUTEROL SULFATE 2.5 MG: 2.5 SOLUTION RESPIRATORY (INHALATION) at 13:45

## 2017-04-19 RX ADMIN — CEFTRIAXONE 1 G: 1 INJECTION, POWDER, FOR SOLUTION INTRAMUSCULAR; INTRAVENOUS at 13:45

## 2017-04-19 NOTE — DISCHARGE INSTRUCTIONS
Return ED fever, vomiting, chest pain, shortness of air, abdominal pain, syncope, vaginal bleeding, worse condition, other concerns

## 2017-04-19 NOTE — ED PROVIDER NOTES
Subjective   HPI Comments: 30yo female pmh significant asthma presents ED c/o 1d hx soa, unresponsive to b2 agonist x1.  Pt currently 18 2/7weeks gestation.  Reports intermittent noc symptoms soa, occurring 1x/month.  ROS neg fever/cough/congestion/chest pain/abdominal pain/dysuria/vaginal bleeding/contractions.    Patient is a 31 y.o. female presenting with shortness of breath.   Shortness of Breath   Severity:  Mild  Duration:  1 day  Timing:  Intermittent  Chronicity:  Recurrent  Relieved by:  Nothing  Worsened by:  Nothing  Ineffective treatments:  Inhaler  Associated symptoms: no abdominal pain, no chest pain, no cough and no fever        Review of Systems   Constitutional: Negative for chills and fever.   HENT: Negative for congestion.    Respiratory: Positive for shortness of breath. Negative for cough.    Cardiovascular: Negative for chest pain, palpitations and leg swelling.   Gastrointestinal: Negative for abdominal pain.   Genitourinary: Negative for dysuria, flank pain and vaginal bleeding.       Past Medical History:   Diagnosis Date   • Acute otitis externa 10/14/2015   • Acute sinusitis 03/31/2015   • Allergic rhinitis 05/04/2015   • Anxiety 05/27/2016   • Asthma    • Bleeding hemorrhoids 08/03/2015   • Depression    • Headache 10/20/2015   • Impacted cerumen 10/14/2015   • Irregular menstruation 12/01/2015   • Irregular periods 06/06/2016   • Knee pain 10/06/2015   • Loss of hair 03/01/2016   • Nonvenomous insect bite 07/05/2016   • Pain in eye 10/20/2015   • Paresthesia 02/20/2015   • Primary fibromyalgia 05/17/2016   • Shoulder joint pain 10/20/2015   • Tremor 04/22/2015       Allergies   Allergen Reactions   • Adhesive Tape    • Fish-Derived Products        Past Surgical History:   Procedure Laterality Date   • ENDOSCOPY  01/25/2016    Normal   • MOUTH SURGERY      Altus teeth extraction       Family History   Problem Relation Age of Onset   • Hypertension Mother    • Hypertension Father    •  Migraines Father    • Hypertension Other    • Asthma Brother    • Migraines Brother    • Heart disease Sister        Social History     Social History   • Marital status: Single     Spouse name: N/A   • Number of children: N/A   • Years of education: N/A     Social History Main Topics   • Smoking status: Former Smoker     Quit date: 1/27/2017   • Smokeless tobacco: Never Used   • Alcohol use No      Comment: Occassionally pre pregnancy   • Drug use: No   • Sexual activity: Yes     Partners: Male     Other Topics Concern   • None     Social History Narrative   • None           Objective   Physical Exam   Constitutional: She is oriented to person, place, and time. She appears well-developed and well-nourished.   HENT:   Head: Normocephalic and atraumatic.   Nose: Nose normal.   Mouth/Throat: Oropharynx is clear and moist.   Eyes: Pupils are equal, round, and reactive to light.   Neck: Neck supple. No JVD present. No tracheal deviation present.   Cardiovascular: Normal rate, regular rhythm, normal heart sounds and intact distal pulses.  Exam reveals no gallop and no friction rub.    No murmur heard.  Pulmonary/Chest: Effort normal and breath sounds normal. She has no wheezes. She has no rales. She exhibits no tenderness.   Abdominal: Soft. Bowel sounds are normal. There is no tenderness. There is no rebound, no guarding and no CVA tenderness.   Musculoskeletal: Normal range of motion. She exhibits no edema or tenderness.   Lymphadenopathy:     She has no cervical adenopathy.   Neurological: She is alert and oriented to person, place, and time.   Skin: Skin is warm and dry.   Nursing note and vitals reviewed.      Procedures         ED Course  ED Course   Comment By Time   PEFR 375/475 predicted (0.79). Ctab. Good air exchange. Sao2 100% RA. Stable discharge. Thomas Moore MD 04/19 1417                  [facility]    Final diagnoses:   Asthma exacerbation   UTI (urinary tract infection), uncomplicated   18 weeks gestation of  pregnancy            Thomas Moore MD  04/19/17 [id number]

## 2017-04-19 NOTE — ED TRIAGE NOTES
Pt presents to the ED with complaints of soa.  Pt has a history of asthma, but feels like it has been worse. Pt has also been having tightness in bilateral legs.  Pt reports that she is 18 weeks pregnant.

## 2017-04-20 LAB — BACTERIA SPEC AEROBE CULT: NORMAL

## 2017-04-24 ENCOUNTER — TELEPHONE (OUTPATIENT)
Dept: OBSTETRICS AND GYNECOLOGY | Facility: CLINIC | Age: 32
End: 2017-04-24

## 2017-04-24 LAB
2ND TRIMESTER 4 SCREEN SERPL-IMP: NORMAL
AFP ADJ MOM SERPL: 1.04
AFP INTERP SERPL-IMP: NORMAL
AFP SERPL-MCNC: 51.4 NG/ML
AGE AT DELIVERY: 31.3 YEARS
CFTR MUT ANL BLD/T: NORMAL
CONV COMMENT: NORMAL
FET TS 18 RISK FROM MAT AGE: NORMAL
FET TS 21 RISK FROM MAT AGE: 587
GA METHOD: NORMAL
GA: 18.1 WEEKS
HCG ADJ MOM SERPL: 1.53
HCG SERPL-ACNC: NORMAL MIU/ML
IDDM PATIENT QL: NO
INHIBIN A ADJ MOM SERPL: 0.97
INHIBIN A SERPL-MCNC: 175.59 PG/ML
LABORATORY COMMENT REPORT: NORMAL
Lab: NORMAL
MULTIPLE PREGNANCY: NO
NEURAL TUBE DEFECT RISK FETUS: NORMAL %
RESULT: NORMAL
TS 18 RISK FETUS: NORMAL
TS 21 RISK FETUS: 4844
U ESTRIOL ADJ MOM SERPL: 1.1
U ESTRIOL SERPL-MCNC: 1.52 NG/ML

## 2017-04-24 NOTE — TELEPHONE ENCOUNTER
----- Message from CATHERINE Solis sent at 4/24/2017  1:05 PM CDT -----  Please let her know that her quad screen was negative. THanks.

## 2017-04-28 ENCOUNTER — OFFICE VISIT (OUTPATIENT)
Dept: FAMILY MEDICINE CLINIC | Facility: CLINIC | Age: 32
End: 2017-04-28

## 2017-04-28 VITALS
HEART RATE: 88 BPM | BODY MASS INDEX: 22.98 KG/M2 | OXYGEN SATURATION: 97 % | SYSTOLIC BLOOD PRESSURE: 110 MMHG | WEIGHT: 142.4 LBS | DIASTOLIC BLOOD PRESSURE: 72 MMHG

## 2017-04-28 DIAGNOSIS — M79.7 PRIMARY FIBROMYALGIA: Primary | ICD-10-CM

## 2017-04-28 DIAGNOSIS — J06.9 UPPER RESPIRATORY TRACT INFECTION, UNSPECIFIED TYPE: ICD-10-CM

## 2017-04-28 PROCEDURE — 99213 OFFICE O/P EST LOW 20 MIN: CPT | Performed by: FAMILY MEDICINE

## 2017-04-28 RX ORDER — AMOXICILLIN 500 MG/1
500 CAPSULE ORAL 2 TIMES DAILY
Qty: 14 CAPSULE | Refills: 0 | Status: SHIPPED | OUTPATIENT
Start: 2017-04-28 | End: 2017-05-05

## 2017-04-28 NOTE — PROGRESS NOTES
Subjective   Ligia Hay is a 31 y.o. female.     Fibromyalgia   This is a chronic problem. The current episode started more than 1 year ago. The problem has been waxing and waning. Associated symptoms include congestion, coughing and a sore throat. Pertinent negatives include no chest pain.   Anxiety   Presents for follow-up visit. Symptoms include irritability, malaise and shortness of breath. Patient reports no chest pain, depressed mood, excessive worry, hyperventilation, insomnia, nervous/anxious behavior, palpitations or panic. Symptoms occur constantly.       URI    This is a new problem. The current episode started in the past 7 days. The problem has been rapidly worsening. There has been no fever. Associated symptoms include congestion, coughing, ear pain, rhinorrhea and a sore throat. Pertinent negatives include no chest pain, dysuria or wheezing.        The following portions of the patient's history were reviewed and updated as appropriate: allergies, current medications, past family history, past medical history, past social history, past surgical history and problem list.    Review of Systems   Constitutional: Positive for irritability.   HENT: Positive for congestion, ear pain, rhinorrhea and sore throat.    Respiratory: Positive for cough and shortness of breath. Negative for wheezing.    Cardiovascular: Negative for chest pain and palpitations.   Genitourinary: Positive for frequency. Negative for dysuria. Menstrual problem: late on period.   Psychiatric/Behavioral: The patient is not nervous/anxious and does not have insomnia.        Objective   Physical Exam   Constitutional: She is oriented to person, place, and time. She appears well-developed and well-nourished. No distress.   HENT:   Head: Normocephalic and atraumatic.   Right Ear: Hearing normal. Tympanic membrane is retracted. Tympanic membrane is not injected, not scarred, not perforated and not erythematous.   Left Ear: Hearing  and tympanic membrane normal.   Nose: Mucosal edema and rhinorrhea present.   Cardiovascular: Normal rate, regular rhythm and normal heart sounds.    Pulmonary/Chest: No respiratory distress. She has no wheezes. She has no rales. She exhibits no tenderness.   Neurological: She is alert and oriented to person, place, and time.   Skin: Skin is warm and dry. She is not diaphoretic.   Psychiatric: She has a normal mood and affect. Her speech is normal and behavior is normal. Judgment and thought content normal. Her mood appears not anxious. Her affect is not angry, not blunt, not labile and not inappropriate. Cognition and memory are normal. She does not exhibit a depressed mood.   Nursing note and vitals reviewed.      Assessment/Plan   Problems Addressed this Visit        Musculoskeletal and Integument    Primary fibromyalgia - Primary      Other Visit Diagnoses     Upper respiratory tract infection, unspecified type

## 2017-05-18 ENCOUNTER — ROUTINE PRENATAL (OUTPATIENT)
Dept: OBSTETRICS AND GYNECOLOGY | Facility: CLINIC | Age: 32
End: 2017-05-18

## 2017-05-18 VITALS — DIASTOLIC BLOOD PRESSURE: 62 MMHG | SYSTOLIC BLOOD PRESSURE: 98 MMHG | WEIGHT: 147 LBS | BODY MASS INDEX: 23.73 KG/M2

## 2017-05-18 DIAGNOSIS — O99.342 DEPRESSION AFFECTING PREGNANCY IN SECOND TRIMESTER, ANTEPARTUM: Primary | ICD-10-CM

## 2017-05-18 DIAGNOSIS — Z3A.22 22 WEEKS GESTATION OF PREGNANCY: ICD-10-CM

## 2017-05-18 DIAGNOSIS — F32.A DEPRESSION AFFECTING PREGNANCY IN SECOND TRIMESTER, ANTEPARTUM: Primary | ICD-10-CM

## 2017-05-18 DIAGNOSIS — F41.9 ANXIETY: ICD-10-CM

## 2017-05-18 PROCEDURE — 0502F SUBSEQUENT PRENATAL CARE: CPT | Performed by: ADVANCED PRACTICE MIDWIFE

## 2017-05-18 RX ORDER — DOCUSATE SODIUM 100 MG/1
100 CAPSULE, LIQUID FILLED ORAL DAILY PRN
Qty: 30 CAPSULE | Refills: 10 | Status: SHIPPED | OUTPATIENT
Start: 2017-05-18 | End: 2018-10-03

## 2017-05-23 ENCOUNTER — RESULTS ENCOUNTER (OUTPATIENT)
Dept: OBSTETRICS AND GYNECOLOGY | Facility: CLINIC | Age: 32
End: 2017-05-23

## 2017-05-23 DIAGNOSIS — F32.A DEPRESSION AFFECTING PREGNANCY IN SECOND TRIMESTER, ANTEPARTUM: ICD-10-CM

## 2017-05-23 DIAGNOSIS — O99.342 DEPRESSION AFFECTING PREGNANCY IN SECOND TRIMESTER, ANTEPARTUM: ICD-10-CM

## 2017-05-23 DIAGNOSIS — F41.9 ANXIETY: ICD-10-CM

## 2017-06-15 ENCOUNTER — ROUTINE PRENATAL (OUTPATIENT)
Dept: OBSTETRICS AND GYNECOLOGY | Facility: CLINIC | Age: 32
End: 2017-06-15

## 2017-06-15 ENCOUNTER — LAB (OUTPATIENT)
Dept: LAB | Facility: HOSPITAL | Age: 32
End: 2017-06-15

## 2017-06-15 VITALS — BODY MASS INDEX: 24.53 KG/M2 | DIASTOLIC BLOOD PRESSURE: 79 MMHG | WEIGHT: 152 LBS | SYSTOLIC BLOOD PRESSURE: 115 MMHG

## 2017-06-15 DIAGNOSIS — O99.342 DEPRESSION AFFECTING PREGNANCY IN SECOND TRIMESTER, ANTEPARTUM: Primary | ICD-10-CM

## 2017-06-15 DIAGNOSIS — Z3A.26 26 WEEKS GESTATION OF PREGNANCY: ICD-10-CM

## 2017-06-15 DIAGNOSIS — O99.342 DEPRESSION AFFECTING PREGNANCY IN SECOND TRIMESTER, ANTEPARTUM: ICD-10-CM

## 2017-06-15 DIAGNOSIS — F32.A DEPRESSION AFFECTING PREGNANCY IN SECOND TRIMESTER, ANTEPARTUM: Primary | ICD-10-CM

## 2017-06-15 DIAGNOSIS — F32.A DEPRESSION AFFECTING PREGNANCY IN SECOND TRIMESTER, ANTEPARTUM: ICD-10-CM

## 2017-06-15 DIAGNOSIS — F41.9 ANXIETY: ICD-10-CM

## 2017-06-15 LAB
DEPRECATED RDW RBC AUTO: 42.7 FL (ref 36.4–46.3)
ERYTHROCYTE [DISTWIDTH] IN BLOOD BY AUTOMATED COUNT: 12.5 % (ref 11.5–14.5)
GLUCOSE 1H P 100 G GLC PO SERPL-MCNC: 105 MG/DL (ref 60–140)
HCT VFR BLD AUTO: 35.5 % (ref 35–45)
HGB BLD-MCNC: 12.2 G/DL (ref 12–15.5)
MCH RBC QN AUTO: 32.6 PG (ref 26.5–34)
MCHC RBC AUTO-ENTMCNC: 34.4 G/DL (ref 31.4–36)
MCV RBC AUTO: 94.9 FL (ref 80–98)
PLATELET # BLD AUTO: 220 10*3/MM3 (ref 150–450)
PMV BLD AUTO: 9.9 FL (ref 8–12)
RBC # BLD AUTO: 3.74 10*6/MM3 (ref 3.77–5.16)
WBC NRBC COR # BLD: 11.22 10*3/MM3 (ref 3.2–9.8)

## 2017-06-15 PROCEDURE — 36415 COLL VENOUS BLD VENIPUNCTURE: CPT | Performed by: ADVANCED PRACTICE MIDWIFE

## 2017-06-15 PROCEDURE — 85027 COMPLETE CBC AUTOMATED: CPT | Performed by: ADVANCED PRACTICE MIDWIFE

## 2017-06-15 PROCEDURE — 82950 GLUCOSE TEST: CPT | Performed by: ADVANCED PRACTICE MIDWIFE

## 2017-06-15 PROCEDURE — 0502F SUBSEQUENT PRENATAL CARE: CPT | Performed by: ADVANCED PRACTICE MIDWIFE

## 2017-06-15 NOTE — PROGRESS NOTES
Next: Repeat PAP per Kiloe's recommendations     CC: KYM visit, history reviewed see history tabs.     ROS: Negative leaking fluid from the vagina, swelling in her legs, headache, visual changes, low back pain and heartburn      Educated on:PEDS, Birth control list given     Plan: f/u in 2 week/s

## 2017-06-21 PROCEDURE — 87086 URINE CULTURE/COLONY COUNT: CPT | Performed by: EMERGENCY MEDICINE

## 2017-06-29 ENCOUNTER — ROUTINE PRENATAL (OUTPATIENT)
Dept: OBSTETRICS AND GYNECOLOGY | Facility: CLINIC | Age: 32
End: 2017-06-29

## 2017-06-29 VITALS — DIASTOLIC BLOOD PRESSURE: 72 MMHG | SYSTOLIC BLOOD PRESSURE: 110 MMHG | WEIGHT: 153 LBS | BODY MASS INDEX: 24.69 KG/M2

## 2017-06-29 DIAGNOSIS — O99.343 DEPRESSION AFFECTING PREGNANCY IN THIRD TRIMESTER, ANTEPARTUM: Primary | ICD-10-CM

## 2017-06-29 DIAGNOSIS — Z3A.28 28 WEEKS GESTATION OF PREGNANCY: ICD-10-CM

## 2017-06-29 DIAGNOSIS — O26.899 VAGINAL DISCHARGE DURING PREGNANCY, UNSPECIFIED TRIMESTER: ICD-10-CM

## 2017-06-29 DIAGNOSIS — N89.8 VAGINAL DISCHARGE DURING PREGNANCY, UNSPECIFIED TRIMESTER: ICD-10-CM

## 2017-06-29 DIAGNOSIS — R87.612 LGSIL ON PAP SMEAR OF CERVIX: ICD-10-CM

## 2017-06-29 DIAGNOSIS — F32.A DEPRESSION AFFECTING PREGNANCY IN THIRD TRIMESTER, ANTEPARTUM: Primary | ICD-10-CM

## 2017-06-29 PROCEDURE — 87624 HPV HI-RISK TYP POOLED RSLT: CPT | Performed by: ADVANCED PRACTICE MIDWIFE

## 2017-06-29 PROCEDURE — 88142 CYTOPATH C/V THIN LAYER: CPT | Performed by: ADVANCED PRACTICE MIDWIFE

## 2017-06-29 PROCEDURE — 0502F SUBSEQUENT PRENATAL CARE: CPT | Performed by: ADVANCED PRACTICE MIDWIFE

## 2017-06-29 PROCEDURE — 88141 CYTOPATH C/V INTERPRET: CPT | Performed by: PATHOLOGY

## 2017-06-29 PROCEDURE — 87510 GARDNER VAG DNA DIR PROBE: CPT | Performed by: ADVANCED PRACTICE MIDWIFE

## 2017-06-29 PROCEDURE — 87660 TRICHOMONAS VAGIN DIR PROBE: CPT | Performed by: ADVANCED PRACTICE MIDWIFE

## 2017-06-29 PROCEDURE — 87480 CANDIDA DNA DIR PROBE: CPT | Performed by: ADVANCED PRACTICE MIDWIFE

## 2017-06-30 PROBLEM — O99.343 DEPRESSION AFFECTING PREGNANCY IN THIRD TRIMESTER, ANTEPARTUM: Status: ACTIVE | Noted: 2017-02-07

## 2017-06-30 LAB
CANDIDA ALBICANS: POSITIVE
GARDNERELLA VAGINALIS: NEGATIVE
TRICHOMONAS VAGINALIS PCR: NEGATIVE

## 2017-06-30 RX ORDER — FLUCONAZOLE 150 MG/1
150 TABLET ORAL ONCE
Qty: 1 TABLET | Refills: 0 | Status: SHIPPED | OUTPATIENT
Start: 2017-06-30 | End: 2017-06-30

## 2017-06-30 NOTE — PROGRESS NOTES
Patient presents for CNT #5 and repeat pap per Dr. Aguirre recommendation. ROS: reports some constipation and has noted a hemorrhoid. Reports some pelvic discomfort. Reports good fetal movement.     Speculum exam: cervix appears closed. Friable upon pap collection. Noted to have mucopurulent white discharge. Vaginal panel collected.     Educated on comfort measures. Has stool softener at pharmacy. Class discussed labor process and took a tour of L&D.

## 2017-07-03 RX ORDER — FLUCONAZOLE 150 MG/1
150 TABLET ORAL ONCE
Qty: 1 TABLET | Refills: 0 | Status: SHIPPED | OUTPATIENT
Start: 2017-07-03 | End: 2017-07-03

## 2017-07-05 LAB
LAB AP CASE REPORT: NORMAL
LAB AP GYN ADDITIONAL INFORMATION: NORMAL
LAB AP GYN OTHER FINDINGS: NORMAL
Lab: NORMAL
PATH INTERP SPEC-IMP: NORMAL
STAT OF ADQ CVX/VAG CYTO-IMP: NORMAL

## 2017-07-07 LAB — HPV I/H RISK 4 DNA CVX QL PROBE+SIG AMP: NEGATIVE

## 2017-07-20 ENCOUNTER — ROUTINE PRENATAL (OUTPATIENT)
Dept: OBSTETRICS AND GYNECOLOGY | Facility: CLINIC | Age: 32
End: 2017-07-20

## 2017-07-20 VITALS — DIASTOLIC BLOOD PRESSURE: 70 MMHG | WEIGHT: 161 LBS | BODY MASS INDEX: 25.99 KG/M2 | SYSTOLIC BLOOD PRESSURE: 111 MMHG

## 2017-07-20 DIAGNOSIS — O99.343 DEPRESSION AFFECTING PREGNANCY IN THIRD TRIMESTER, ANTEPARTUM: ICD-10-CM

## 2017-07-20 DIAGNOSIS — F32.A DEPRESSION AFFECTING PREGNANCY IN THIRD TRIMESTER, ANTEPARTUM: ICD-10-CM

## 2017-07-20 DIAGNOSIS — Z3A.31 31 WEEKS GESTATION OF PREGNANCY: Primary | ICD-10-CM

## 2017-07-20 PROCEDURE — 0502F SUBSEQUENT PRENATAL CARE: CPT | Performed by: ADVANCED PRACTICE MIDWIFE

## 2017-07-20 RX ORDER — DIPHENHYDRAMINE HCL 12.5MG/5ML
LIQUID (ML) ORAL 4 TIMES DAILY PRN
COMMUNITY
End: 2017-10-05 | Stop reason: HOSPADM

## 2017-07-20 NOTE — PROGRESS NOTES
CC: KYM visit, history reviewed see history tabs.     ROS:Positive fatigue  Negative regular contractions , leaking fluid from the vagina, headache, vaginal bleeding and dysuria    Educated on: comfort measures for fatigue    Plan: f/u on 7/27 for next CNT class

## 2017-07-27 ENCOUNTER — ROUTINE PRENATAL (OUTPATIENT)
Dept: OBSTETRICS AND GYNECOLOGY | Facility: CLINIC | Age: 32
End: 2017-07-27

## 2017-07-27 VITALS — BODY MASS INDEX: 26.15 KG/M2 | DIASTOLIC BLOOD PRESSURE: 81 MMHG | SYSTOLIC BLOOD PRESSURE: 117 MMHG | WEIGHT: 162 LBS

## 2017-07-27 DIAGNOSIS — F32.A DEPRESSION AFFECTING PREGNANCY IN THIRD TRIMESTER, ANTEPARTUM: Primary | ICD-10-CM

## 2017-07-27 DIAGNOSIS — O99.343 DEPRESSION AFFECTING PREGNANCY IN THIRD TRIMESTER, ANTEPARTUM: Primary | ICD-10-CM

## 2017-07-27 DIAGNOSIS — Z3A.32 32 WEEKS GESTATION OF PREGNANCY: ICD-10-CM

## 2017-07-27 PROCEDURE — 0502F SUBSEQUENT PRENATAL CARE: CPT | Performed by: ADVANCED PRACTICE MIDWIFE

## 2017-07-27 NOTE — PROGRESS NOTES
CC: KYM visit with CNT class, history reviewed see history tabs.     ROS:Positive pelvic pressure  Negative regular contractions , leaking fluid from the vagina, vaginal bleeding and dysuria    Educated on: breastfeeding    Plan: f/u in 2 weeks

## 2017-08-10 ENCOUNTER — ROUTINE PRENATAL (OUTPATIENT)
Dept: OBSTETRICS AND GYNECOLOGY | Facility: CLINIC | Age: 32
End: 2017-08-10

## 2017-08-10 VITALS — SYSTOLIC BLOOD PRESSURE: 106 MMHG | DIASTOLIC BLOOD PRESSURE: 74 MMHG | BODY MASS INDEX: 26.63 KG/M2 | WEIGHT: 165 LBS

## 2017-08-10 DIAGNOSIS — Z3A.34 34 WEEKS GESTATION OF PREGNANCY: ICD-10-CM

## 2017-08-10 DIAGNOSIS — O99.343 DEPRESSION AFFECTING PREGNANCY IN THIRD TRIMESTER, ANTEPARTUM: Primary | ICD-10-CM

## 2017-08-10 DIAGNOSIS — F32.A DEPRESSION AFFECTING PREGNANCY IN THIRD TRIMESTER, ANTEPARTUM: Primary | ICD-10-CM

## 2017-08-10 PROCEDURE — 0502F SUBSEQUENT PRENATAL CARE: CPT | Performed by: ADVANCED PRACTICE MIDWIFE

## 2017-08-11 NOTE — PROGRESS NOTES
CC: KYM visit, history reviewed see history tabs.     ROS: Negative leaking fluid from the vagina, swelling in her legs, headache, visual changes, low back pain and heartburn      Educated on:CNT class, FKC, GBS     A/Plan: f/u in 2 week/s   Ligia was seen today for routine prenatal visit.    Diagnoses and all orders for this visit:    Depression affecting pregnancy in third trimester, antepartum

## 2017-08-23 ENCOUNTER — HOSPITAL ENCOUNTER (OUTPATIENT)
Facility: HOSPITAL | Age: 32
Discharge: HOME | End: 2017-08-23
Attending: OBSTETRICS & GYNECOLOGY | Admitting: OBSTETRICS & GYNECOLOGY

## 2017-08-23 VITALS
RESPIRATION RATE: 18 BRPM | DIASTOLIC BLOOD PRESSURE: 73 MMHG | SYSTOLIC BLOOD PRESSURE: 124 MMHG | TEMPERATURE: 98.3 F | HEART RATE: 75 BPM | OXYGEN SATURATION: 99 %

## 2017-08-23 LAB
AMPHET+METHAMPHET UR QL: NEGATIVE
BACTERIA UR QL AUTO: ABNORMAL /HPF
BARBITURATES UR QL SCN: NEGATIVE
BENZODIAZ UR QL SCN: NEGATIVE
BILIRUB UR QL STRIP: NEGATIVE
CANNABINOIDS SERPL QL: NEGATIVE
CLARITY UR: CLEAR
COCAINE UR QL: NEGATIVE
COLOR UR: YELLOW
GLUCOSE UR STRIP-MCNC: NEGATIVE MG/DL
HGB UR QL STRIP.AUTO: NEGATIVE
HYALINE CASTS UR QL AUTO: ABNORMAL /LPF
KETONES UR QL STRIP: NEGATIVE
LEUKOCYTE ESTERASE UR QL STRIP.AUTO: ABNORMAL
METHADONE UR QL SCN: NEGATIVE
NITRITE UR QL STRIP: NEGATIVE
OPIATES UR QL: NEGATIVE
OXYCODONE UR QL SCN: NEGATIVE
PH UR STRIP.AUTO: 7 [PH] (ref 5–9)
PROT UR QL STRIP: NEGATIVE
RBC # UR: ABNORMAL /HPF
REF LAB TEST METHOD: ABNORMAL
SP GR UR STRIP: 1.01 (ref 1–1.03)
SQUAMOUS #/AREA URNS HPF: ABNORMAL /HPF
UROBILINOGEN UR QL STRIP: ABNORMAL
WBC UR QL AUTO: ABNORMAL /HPF

## 2017-08-23 PROCEDURE — 80307 DRUG TEST PRSMV CHEM ANLYZR: CPT | Performed by: OBSTETRICS & GYNECOLOGY

## 2017-08-23 PROCEDURE — 59025 FETAL NON-STRESS TEST: CPT

## 2017-08-23 PROCEDURE — 81001 URINALYSIS AUTO W/SCOPE: CPT | Performed by: OBSTETRICS & GYNECOLOGY

## 2017-08-23 PROCEDURE — 93005 ELECTROCARDIOGRAM TRACING: CPT | Performed by: OBSTETRICS & GYNECOLOGY

## 2017-08-23 PROCEDURE — G0463 HOSPITAL OUTPT CLINIC VISIT: HCPCS

## 2017-08-23 PROCEDURE — 93010 ELECTROCARDIOGRAM REPORT: CPT | Performed by: INTERNAL MEDICINE

## 2017-08-23 RX ORDER — SODIUM CHLORIDE 0.9 % (FLUSH) 0.9 %
1-10 SYRINGE (ML) INJECTION AS NEEDED
Status: DISCONTINUED | OUTPATIENT
Start: 2017-08-23 | End: 2017-08-23 | Stop reason: HOSPADM

## 2017-08-23 RX ORDER — SODIUM CHLORIDE, SODIUM LACTATE, POTASSIUM CHLORIDE, CALCIUM CHLORIDE 600; 310; 30; 20 MG/100ML; MG/100ML; MG/100ML; MG/100ML
999 INJECTION, SOLUTION INTRAVENOUS CONTINUOUS
Status: DISCONTINUED | OUTPATIENT
Start: 2017-08-23 | End: 2017-08-23 | Stop reason: HOSPADM

## 2017-08-23 RX ORDER — SODIUM CHLORIDE, SODIUM LACTATE, POTASSIUM CHLORIDE, CALCIUM CHLORIDE 600; 310; 30; 20 MG/100ML; MG/100ML; MG/100ML; MG/100ML
INJECTION, SOLUTION INTRAVENOUS
Status: DISCONTINUED
Start: 2017-08-23 | End: 2017-08-23 | Stop reason: HOSPADM

## 2017-08-23 RX ADMIN — SODIUM CHLORIDE, POTASSIUM CHLORIDE, SODIUM LACTATE AND CALCIUM CHLORIDE 999 ML/HR: 600; 310; 30; 20 INJECTION, SOLUTION INTRAVENOUS at 14:14

## 2017-08-23 NOTE — DISCHARGE INSTR - ACTIVITY
Please return for decreased fetal movement; your water breaks; bright, red vaginal bleeding; regular, intense contractions. Please keep all follow-up appointments as scheduled.

## 2017-08-23 NOTE — NON STRESS TEST
Ligia Hay, a  at 36w2d with an PAOLA of 2017, by Ultrasound, was seen at [facility] for a nonstress test.    Chief Complaint   Patient presents with   • Non-stress Test     dizziness, cramping, feeling about to pass out and blurred vision       Interpretation A  Nonstress Test Interpretation A: Reactive (17 1430 : Blessing Najera RN)  Comments A: Reviewed per 2 RN's Neeta Krueger RN (17 1430 : Blessing Najera, RN)

## 2017-08-23 NOTE — NON STRESS TEST
Ligia Hay, a  at 36w2d with an PAOLA of 2017, by Ultrasound, was seen at [facility] for a nonstress test.    Chief Complaint   Patient presents with   • Non-stress Test     dizziness, cramping, feeling about to pass out and blurred vision       Interpretation A  Nonstress Test Interpretation A: Reactive (17 4751 : Yessica Tavarez RN)  Comments A: Reviewed and confirmed by 2 RNs.  DHAVAL Tavarez RN and EVA Krueger RN (17 [id number] : Yessica Tavarez RN)

## 2017-08-24 ENCOUNTER — APPOINTMENT (OUTPATIENT)
Dept: LAB | Facility: HOSPITAL | Age: 32
End: 2017-08-24

## 2017-08-24 ENCOUNTER — ROUTINE PRENATAL (OUTPATIENT)
Dept: OBSTETRICS AND GYNECOLOGY | Facility: CLINIC | Age: 32
End: 2017-08-24

## 2017-08-24 VITALS — SYSTOLIC BLOOD PRESSURE: 123 MMHG | WEIGHT: 168 LBS | BODY MASS INDEX: 27.12 KG/M2 | DIASTOLIC BLOOD PRESSURE: 84 MMHG

## 2017-08-24 DIAGNOSIS — F32.A DEPRESSION AFFECTING PREGNANCY IN THIRD TRIMESTER, ANTEPARTUM: Primary | ICD-10-CM

## 2017-08-24 DIAGNOSIS — R42 DIZZINESSES: ICD-10-CM

## 2017-08-24 DIAGNOSIS — Z36.85 ANTENATAL SCREENING FOR STREPTOCOCCUS B: ICD-10-CM

## 2017-08-24 DIAGNOSIS — Z3A.36 36 WEEKS GESTATION OF PREGNANCY: ICD-10-CM

## 2017-08-24 DIAGNOSIS — R00.2 PALPITATIONS: ICD-10-CM

## 2017-08-24 DIAGNOSIS — O99.343 DEPRESSION AFFECTING PREGNANCY IN THIRD TRIMESTER, ANTEPARTUM: Primary | ICD-10-CM

## 2017-08-24 LAB
ALBUMIN SERPL-MCNC: 3.5 G/DL (ref 3.4–4.8)
ALBUMIN/GLOB SERPL: 1.1 G/DL (ref 1.1–1.8)
ALP SERPL-CCNC: 167 U/L (ref 38–126)
ALT SERPL W P-5'-P-CCNC: 34 U/L (ref 9–52)
ANION GAP SERPL CALCULATED.3IONS-SCNC: 6 MMOL/L (ref 5–15)
AST SERPL-CCNC: 32 U/L (ref 14–36)
BILIRUB SERPL-MCNC: 0.3 MG/DL (ref 0.2–1.3)
BUN BLD-MCNC: 7 MG/DL (ref 7–21)
BUN/CREAT SERPL: 12.1 (ref 7–25)
CALCIUM SPEC-SCNC: 9.2 MG/DL (ref 8.4–10.2)
CANDIDA ALBICANS: NEGATIVE
CHLORIDE SERPL-SCNC: 103 MMOL/L (ref 95–110)
CO2 SERPL-SCNC: 24 MMOL/L (ref 22–31)
CREAT BLD-MCNC: 0.58 MG/DL (ref 0.5–1)
DEPRECATED RDW RBC AUTO: 42 FL (ref 36.4–46.3)
ERYTHROCYTE [DISTWIDTH] IN BLOOD BY AUTOMATED COUNT: 12.5 % (ref 11.5–14.5)
GARDNERELLA VAGINALIS: NEGATIVE
GFR SERPL CREATININE-BSD FRML MDRD: 147 ML/MIN/1.73 (ref 64–149)
GLOBULIN UR ELPH-MCNC: 3.1 GM/DL (ref 2.3–3.5)
GLUCOSE BLD-MCNC: 79 MG/DL (ref 60–100)
HCT VFR BLD AUTO: 35.9 % (ref 35–45)
HGB BLD-MCNC: 12.2 G/DL (ref 12–15.5)
MCH RBC QN AUTO: 31.6 PG (ref 26.5–34)
MCHC RBC AUTO-ENTMCNC: 34 G/DL (ref 31.4–36)
MCV RBC AUTO: 93 FL (ref 80–98)
PLATELET # BLD AUTO: 205 10*3/MM3 (ref 150–450)
PMV BLD AUTO: 10.2 FL (ref 8–12)
POTASSIUM BLD-SCNC: 3.8 MMOL/L (ref 3.5–5.1)
PROT SERPL-MCNC: 6.6 G/DL (ref 6.3–8.6)
RBC # BLD AUTO: 3.86 10*6/MM3 (ref 3.77–5.16)
SODIUM BLD-SCNC: 133 MMOL/L (ref 137–145)
TRICHOMONAS VAGINALIS PCR: NEGATIVE
WBC NRBC COR # BLD: 9.64 10*3/MM3 (ref 3.2–9.8)

## 2017-08-24 PROCEDURE — 80053 COMPREHEN METABOLIC PANEL: CPT | Performed by: ADVANCED PRACTICE MIDWIFE

## 2017-08-24 PROCEDURE — 0502F SUBSEQUENT PRENATAL CARE: CPT | Performed by: ADVANCED PRACTICE MIDWIFE

## 2017-08-24 PROCEDURE — 85027 COMPLETE CBC AUTOMATED: CPT | Performed by: ADVANCED PRACTICE MIDWIFE

## 2017-08-24 PROCEDURE — 36415 COLL VENOUS BLD VENIPUNCTURE: CPT

## 2017-08-24 PROCEDURE — 87480 CANDIDA DNA DIR PROBE: CPT | Performed by: ADVANCED PRACTICE MIDWIFE

## 2017-08-24 PROCEDURE — 87510 GARDNER VAG DNA DIR PROBE: CPT | Performed by: ADVANCED PRACTICE MIDWIFE

## 2017-08-24 PROCEDURE — 87653 STREP B DNA AMP PROBE: CPT | Performed by: ADVANCED PRACTICE MIDWIFE

## 2017-08-24 PROCEDURE — 87660 TRICHOMONAS VAGIN DIR PROBE: CPT | Performed by: ADVANCED PRACTICE MIDWIFE

## 2017-08-24 NOTE — PROGRESS NOTES
CC: KYM visit, history reviewed see history tabs.     ROS:Positive continues to have occasional dizziness, went to ER yesterday and she received fluids   Negative leaking fluid from the vagina, swelling in her legs, headache, visual changes, low back pain and heartburn      Educated on:POC- referral to cardiology, cbc, cmp today, educated on decreasing sweets and increasing protein in diet. Agrees and verbalized understanding     A/Plan: f/u in 1 week/s   Ligia was seen today for routine prenatal visit.    Diagnoses and all orders for this visit:    Depression affecting pregnancy in third trimester, antepartum  -     CBC (No Diff)  -     Comprehensive Metabolic Panel  -     Ambulatory Referral to Cardiology  -     Gardnerella vaginalis, Trichomonas vaginalis, Candida albicans, PCR     screening for streptococcus B  -     Group B Strep (Molecular)  -     CBC (No Diff)  -     Comprehensive Metabolic Panel  -     Ambulatory Referral to Cardiology  -     Gardnerella vaginalis, Trichomonas vaginalis, Candida albicans, PCR    36 weeks gestation of pregnancy  -     CBC (No Diff)  -     Comprehensive Metabolic Panel  -     Ambulatory Referral to Cardiology  -     Gardnerella vaginalis, Trichomonas vaginalis, Candida albicans, PCR    Palpitations  -     CBC (No Diff)  -     Comprehensive Metabolic Panel  -     Ambulatory Referral to Cardiology  -     Gardnerella vaginalis, Trichomonas vaginalis, Candida albicans, PCR    Dizzinesses  -     CBC (No Diff)  -     Comprehensive Metabolic Panel  -     Ambulatory Referral to Cardiology  -     Gardnerella vaginalis, Trichomonas vaginalis, Candida albicans, PCR

## 2017-08-25 LAB — GROUP B STREP, DNA: NEGATIVE

## 2017-08-31 ENCOUNTER — ROUTINE PRENATAL (OUTPATIENT)
Dept: OBSTETRICS AND GYNECOLOGY | Facility: CLINIC | Age: 32
End: 2017-08-31

## 2017-08-31 ENCOUNTER — OFFICE VISIT (OUTPATIENT)
Dept: CARDIOLOGY | Facility: CLINIC | Age: 32
End: 2017-08-31

## 2017-08-31 VITALS
DIASTOLIC BLOOD PRESSURE: 70 MMHG | WEIGHT: 168 LBS | BODY MASS INDEX: 27 KG/M2 | HEART RATE: 76 BPM | HEIGHT: 66 IN | SYSTOLIC BLOOD PRESSURE: 122 MMHG

## 2017-08-31 VITALS — WEIGHT: 168 LBS | DIASTOLIC BLOOD PRESSURE: 78 MMHG | BODY MASS INDEX: 27.12 KG/M2 | SYSTOLIC BLOOD PRESSURE: 117 MMHG

## 2017-08-31 DIAGNOSIS — F32.A DEPRESSION AFFECTING PREGNANCY IN THIRD TRIMESTER, ANTEPARTUM: Primary | ICD-10-CM

## 2017-08-31 DIAGNOSIS — Z3A.37 37 WEEKS GESTATION OF PREGNANCY: ICD-10-CM

## 2017-08-31 DIAGNOSIS — R42 DIZZINESS: ICD-10-CM

## 2017-08-31 DIAGNOSIS — Z72.0 NICOTINE ABUSE: ICD-10-CM

## 2017-08-31 DIAGNOSIS — R87.612 LGSIL ON PAP SMEAR OF CERVIX: ICD-10-CM

## 2017-08-31 DIAGNOSIS — R00.2 PALPITATIONS: Primary | ICD-10-CM

## 2017-08-31 DIAGNOSIS — O99.343 DEPRESSION AFFECTING PREGNANCY IN THIRD TRIMESTER, ANTEPARTUM: Primary | ICD-10-CM

## 2017-08-31 PROCEDURE — 0502F SUBSEQUENT PRENATAL CARE: CPT | Performed by: ADVANCED PRACTICE MIDWIFE

## 2017-08-31 PROCEDURE — 99203 OFFICE O/P NEW LOW 30 MIN: CPT | Performed by: INTERNAL MEDICINE

## 2017-09-07 ENCOUNTER — ROUTINE PRENATAL (OUTPATIENT)
Dept: OBSTETRICS AND GYNECOLOGY | Facility: CLINIC | Age: 32
End: 2017-09-07

## 2017-09-07 VITALS — WEIGHT: 171 LBS | BODY MASS INDEX: 27.6 KG/M2 | DIASTOLIC BLOOD PRESSURE: 83 MMHG | SYSTOLIC BLOOD PRESSURE: 132 MMHG

## 2017-09-07 DIAGNOSIS — O99.343 DEPRESSION AFFECTING PREGNANCY IN THIRD TRIMESTER, ANTEPARTUM: ICD-10-CM

## 2017-09-07 DIAGNOSIS — F32.A DEPRESSION AFFECTING PREGNANCY IN THIRD TRIMESTER, ANTEPARTUM: ICD-10-CM

## 2017-09-07 PROCEDURE — 0502F SUBSEQUENT PRENATAL CARE: CPT | Performed by: ADVANCED PRACTICE MIDWIFE

## 2017-09-07 NOTE — PROGRESS NOTES
CC: KYM visit, history reviewed see history tabs.     ROS: Negative leaking fluid from the vagina, swelling in her legs, headache, visual changes, low back pain and heartburn    Objective: 1.5 cm cervix  Educated on:    A/Plan: f/u in 1 week/s   Ligia was seen today for routine prenatal visit.    Diagnoses and all orders for this visit:    Depression affecting pregnancy in third trimester, antepartum

## 2017-09-12 ENCOUNTER — HOSPITAL ENCOUNTER (OUTPATIENT)
Facility: HOSPITAL | Age: 32
Discharge: HOME | End: 2017-09-12
Attending: OBSTETRICS & GYNECOLOGY | Admitting: OBSTETRICS & GYNECOLOGY

## 2017-09-12 VITALS
TEMPERATURE: 98.3 F | BODY MASS INDEX: 27.6 KG/M2 | SYSTOLIC BLOOD PRESSURE: 124 MMHG | HEART RATE: 85 BPM | DIASTOLIC BLOOD PRESSURE: 74 MMHG | WEIGHT: 171 LBS | OXYGEN SATURATION: 99 % | RESPIRATION RATE: 18 BRPM

## 2017-09-12 DIAGNOSIS — O48.0 POST TERM PREGNANCY, UNSPECIFIED EPISODE OF CARE: Primary | ICD-10-CM

## 2017-09-12 LAB
A1 MICROGLOB PLACENTAL VAG QL: NEGATIVE
CANDIDA ALBICANS: NEGATIVE
GARDNERELLA VAGINALIS: NEGATIVE
TRICHOMONAS VAGINALIS PCR: NEGATIVE

## 2017-09-12 PROCEDURE — G0463 HOSPITAL OUTPT CLINIC VISIT: HCPCS

## 2017-09-12 PROCEDURE — 87480 CANDIDA DNA DIR PROBE: CPT | Performed by: ADVANCED PRACTICE MIDWIFE

## 2017-09-12 PROCEDURE — 84112 EVAL AMNIOTIC FLUID PROTEIN: CPT | Performed by: ADVANCED PRACTICE MIDWIFE

## 2017-09-12 PROCEDURE — 87510 GARDNER VAG DNA DIR PROBE: CPT | Performed by: ADVANCED PRACTICE MIDWIFE

## 2017-09-12 PROCEDURE — 87660 TRICHOMONAS VAGIN DIR PROBE: CPT | Performed by: ADVANCED PRACTICE MIDWIFE

## 2017-09-12 PROCEDURE — 59025 FETAL NON-STRESS TEST: CPT

## 2017-09-12 RX ORDER — HYDROXYZINE PAMOATE 50 MG/1
CAPSULE ORAL
Status: COMPLETED
Start: 2017-09-12 | End: 2017-09-12

## 2017-09-12 RX ORDER — HYDROXYZINE PAMOATE 50 MG/1
50 CAPSULE ORAL ONCE
Status: COMPLETED | OUTPATIENT
Start: 2017-09-12 | End: 2017-09-12

## 2017-09-12 RX ADMIN — HYDROXYZINE PAMOATE 50 MG: 50 CAPSULE ORAL at 15:52

## 2017-09-12 NOTE — NON STRESS TEST
Ligia Camarillo Satnam, a  at 39w1d with an PAOLA of 2017, by Ultrasound, was seen at [facility] for a nonstress test.    Chief Complaint   Patient presents with   • Contractions

## 2017-09-14 LAB
BH CV ECHO MEAS - ACS: 1.7 CM
BH CV ECHO MEAS - AO MAX PG (FULL): 3.8 MMHG
BH CV ECHO MEAS - AO MAX PG: 9.4 MMHG
BH CV ECHO MEAS - AO MEAN PG (FULL): 2 MMHG
BH CV ECHO MEAS - AO MEAN PG: 5 MMHG
BH CV ECHO MEAS - AO ROOT AREA (BSA CORRECTED): 1.3
BH CV ECHO MEAS - AO ROOT AREA: 4.9 CM^2
BH CV ECHO MEAS - AO ROOT DIAM: 2.5 CM
BH CV ECHO MEAS - AO V2 MAX: 153 CM/SEC
BH CV ECHO MEAS - AO V2 MEAN: 96.3 CM/SEC
BH CV ECHO MEAS - AO V2 VTI: 26.6 CM
BH CV ECHO MEAS - BSA(HAYCOCK): 1.9 M^2
BH CV ECHO MEAS - BSA: 1.9 M^2
BH CV ECHO MEAS - BZI_BMI: 27.4 KILOGRAMS/M^2
BH CV ECHO MEAS - BZI_METRIC_HEIGHT: 167.6 CM
BH CV ECHO MEAS - BZI_METRIC_WEIGHT: 77.1 KG
BH CV ECHO MEAS - EDV(CUBED): 68.9 ML
BH CV ECHO MEAS - EDV(TEICH): 74.2 ML
BH CV ECHO MEAS - EF(CUBED): 74.5 %
BH CV ECHO MEAS - EF(TEICH): 66.8 %
BH CV ECHO MEAS - EPSS: 0.3 CM
BH CV ECHO MEAS - ESV(CUBED): 17.6 ML
BH CV ECHO MEAS - ESV(TEICH): 24.6 ML
BH CV ECHO MEAS - FS: 36.6 %
BH CV ECHO MEAS - IVS/LVPW: 0.83
BH CV ECHO MEAS - IVSD: 1 CM
BH CV ECHO MEAS - LA DIMENSION: 3.3 CM
BH CV ECHO MEAS - LA/AO: 1.3
BH CV ECHO MEAS - LV MASS(C)D: 151.3 GRAMS
BH CV ECHO MEAS - LV MASS(C)DI: 81.1 GRAMS/M^2
BH CV ECHO MEAS - LV MAX PG: 5.6 MMHG
BH CV ECHO MEAS - LV MEAN PG: 3 MMHG
BH CV ECHO MEAS - LV V1 MAX: 118 CM/SEC
BH CV ECHO MEAS - LV V1 MEAN: 74.6 CM/SEC
BH CV ECHO MEAS - LV V1 VTI: 22 CM
BH CV ECHO MEAS - LVIDD: 4.1 CM
BH CV ECHO MEAS - LVIDS: 2.6 CM
BH CV ECHO MEAS - LVPWD: 1.2 CM
BH CV ECHO MEAS - MV A MAX VEL: 88.4 CM/SEC
BH CV ECHO MEAS - MV E MAX VEL: 98.7 CM/SEC
BH CV ECHO MEAS - MV E/A: 1.1
BH CV ECHO MEAS - PA MAX PG: 7.4 MMHG
BH CV ECHO MEAS - PA MEAN PG: 4 MMHG
BH CV ECHO MEAS - PA V2 MAX: 136 CM/SEC
BH CV ECHO MEAS - PA V2 MEAN: 88 CM/SEC
BH CV ECHO MEAS - PA V2 VTI: 27.2 CM
BH CV ECHO MEAS - PI END-D VEL: 125 CM/SEC
BH CV ECHO MEAS - RVDD: 2.5 CM
BH CV ECHO MEAS - SI(AO): 69.9 ML/M^2
BH CV ECHO MEAS - SI(CUBED): 27.5 ML/M^2
BH CV ECHO MEAS - SI(TEICH): 26.6 ML/M^2
BH CV ECHO MEAS - SV(AO): 130.6 ML
BH CV ECHO MEAS - SV(CUBED): 51.3 ML
BH CV ECHO MEAS - SV(TEICH): 49.6 ML
BH CV ECHO MEAS - TR MAX VEL: 187 CM/SEC
LV EF 2D ECHO EST: 65 %

## 2017-09-18 ENCOUNTER — ROUTINE PRENATAL (OUTPATIENT)
Dept: OBSTETRICS AND GYNECOLOGY | Facility: CLINIC | Age: 32
End: 2017-09-18

## 2017-09-18 ENCOUNTER — TELEPHONE (OUTPATIENT)
Dept: CARDIOLOGY | Facility: CLINIC | Age: 32
End: 2017-09-18

## 2017-09-18 ENCOUNTER — HOSPITAL ENCOUNTER (INPATIENT)
Facility: HOSPITAL | Age: 32
LOS: 2 days | Discharge: HOME | End: 2017-09-20
Attending: OBSTETRICS & GYNECOLOGY | Admitting: OBSTETRICS & GYNECOLOGY

## 2017-09-18 ENCOUNTER — PREP FOR SURGERY (OUTPATIENT)
Dept: OTHER | Facility: HOSPITAL | Age: 32
End: 2017-09-18

## 2017-09-18 VITALS — SYSTOLIC BLOOD PRESSURE: 124 MMHG | BODY MASS INDEX: 27.44 KG/M2 | WEIGHT: 170 LBS | DIASTOLIC BLOOD PRESSURE: 84 MMHG

## 2017-09-18 DIAGNOSIS — O48.0 POST TERM PREGNANCY, UNSPECIFIED EPISODE OF CARE: Primary | ICD-10-CM

## 2017-09-18 DIAGNOSIS — F32.A DEPRESSION AFFECTING PREGNANCY IN THIRD TRIMESTER, ANTEPARTUM: Primary | ICD-10-CM

## 2017-09-18 DIAGNOSIS — Z3A.40 40 WEEKS GESTATION OF PREGNANCY: ICD-10-CM

## 2017-09-18 DIAGNOSIS — O99.343 DEPRESSION AFFECTING PREGNANCY IN THIRD TRIMESTER, ANTEPARTUM: Primary | ICD-10-CM

## 2017-09-18 DIAGNOSIS — R87.612 LGSIL ON PAP SMEAR OF CERVIX: ICD-10-CM

## 2017-09-18 DIAGNOSIS — O48.0 POST TERM PREGNANCY, UNSPECIFIED EPISODE OF CARE: ICD-10-CM

## 2017-09-18 LAB
ABO GROUP BLD: NORMAL
AMPHET+METHAMPHET UR QL: NEGATIVE
BARBITURATES UR QL SCN: NEGATIVE
BENZODIAZ UR QL SCN: NEGATIVE
BLD GP AB SCN SERPL QL: NEGATIVE
CANNABINOIDS SERPL QL: NEGATIVE
COCAINE UR QL: NEGATIVE
DEPRECATED RDW RBC AUTO: 42.1 FL (ref 36.4–46.3)
ERYTHROCYTE [DISTWIDTH] IN BLOOD BY AUTOMATED COUNT: 12.7 % (ref 11.5–14.5)
HCT VFR BLD AUTO: 37 % (ref 35–45)
HGB BLD-MCNC: 12.6 G/DL (ref 12–15.5)
Lab: NORMAL
MCH RBC QN AUTO: 31 PG (ref 26.5–34)
MCHC RBC AUTO-ENTMCNC: 34.1 G/DL (ref 31.4–36)
MCV RBC AUTO: 91.1 FL (ref 80–98)
METHADONE UR QL SCN: NEGATIVE
OPIATES UR QL: NEGATIVE
OXYCODONE UR QL SCN: NEGATIVE
PLATELET # BLD AUTO: 199 10*3/MM3 (ref 150–450)
PMV BLD AUTO: 11 FL (ref 8–12)
RBC # BLD AUTO: 4.06 10*6/MM3 (ref 3.77–5.16)
RH BLD: POSITIVE
WBC NRBC COR # BLD: 9.06 10*3/MM3 (ref 3.2–9.8)

## 2017-09-18 PROCEDURE — 86850 RBC ANTIBODY SCREEN: CPT | Performed by: ADVANCED PRACTICE MIDWIFE

## 2017-09-18 PROCEDURE — 80307 DRUG TEST PRSMV CHEM ANLYZR: CPT | Performed by: ADVANCED PRACTICE MIDWIFE

## 2017-09-18 PROCEDURE — 88307 TISSUE EXAM BY PATHOLOGIST: CPT | Performed by: OBSTETRICS & GYNECOLOGY

## 2017-09-18 PROCEDURE — 86900 BLOOD TYPING SEROLOGIC ABO: CPT | Performed by: ADVANCED PRACTICE MIDWIFE

## 2017-09-18 PROCEDURE — 85027 COMPLETE CBC AUTOMATED: CPT | Performed by: ADVANCED PRACTICE MIDWIFE

## 2017-09-18 PROCEDURE — 25010000002 BUTORPHANOL PER 1 MG

## 2017-09-18 PROCEDURE — 0KQM0ZZ REPAIR PERINEUM MUSCLE, OPEN APPROACH: ICD-10-PCS | Performed by: OBSTETRICS & GYNECOLOGY

## 2017-09-18 PROCEDURE — 88307 TISSUE EXAM BY PATHOLOGIST: CPT | Performed by: PATHOLOGY

## 2017-09-18 PROCEDURE — 59025 FETAL NON-STRESS TEST: CPT | Performed by: ADVANCED PRACTICE MIDWIFE

## 2017-09-18 PROCEDURE — 10907ZC DRAINAGE OF AMNIOTIC FLUID, THERAPEUTIC FROM PRODUCTS OF CONCEPTION, VIA NATURAL OR ARTIFICIAL OPENING: ICD-10-PCS | Performed by: OBSTETRICS & GYNECOLOGY

## 2017-09-18 PROCEDURE — 0502F SUBSEQUENT PRENATAL CARE: CPT | Performed by: ADVANCED PRACTICE MIDWIFE

## 2017-09-18 PROCEDURE — 59400 OBSTETRICAL CARE: CPT | Performed by: OBSTETRICS & GYNECOLOGY

## 2017-09-18 PROCEDURE — 86901 BLOOD TYPING SEROLOGIC RH(D): CPT | Performed by: ADVANCED PRACTICE MIDWIFE

## 2017-09-18 RX ORDER — SODIUM CHLORIDE, SODIUM LACTATE, POTASSIUM CHLORIDE, CALCIUM CHLORIDE 600; 310; 30; 20 MG/100ML; MG/100ML; MG/100ML; MG/100ML
INJECTION, SOLUTION INTRAVENOUS
Status: COMPLETED
Start: 2017-09-18 | End: 2017-09-18

## 2017-09-18 RX ORDER — ACETAMINOPHEN 325 MG/1
650 TABLET ORAL EVERY 4 HOURS PRN
Status: CANCELLED | OUTPATIENT
Start: 2017-09-18

## 2017-09-18 RX ORDER — METHYLERGONOVINE MALEATE 0.2 MG/ML
200 INJECTION INTRAVENOUS ONCE AS NEEDED
Status: DISCONTINUED | OUTPATIENT
Start: 2017-09-18 | End: 2017-09-19 | Stop reason: HOSPADM

## 2017-09-18 RX ORDER — CARBOPROST TROMETHAMINE 250 UG/ML
250 INJECTION, SOLUTION INTRAMUSCULAR AS NEEDED
Status: CANCELLED | OUTPATIENT
Start: 2017-09-18

## 2017-09-18 RX ORDER — ZOLPIDEM TARTRATE 5 MG/1
TABLET ORAL
Status: COMPLETED
Start: 2017-09-18 | End: 2017-09-18

## 2017-09-18 RX ORDER — ZOLPIDEM TARTRATE 5 MG/1
5 TABLET ORAL NIGHTLY PRN
Status: ACTIVE | OUTPATIENT
Start: 2017-09-18 | End: 2017-09-19

## 2017-09-18 RX ORDER — ALBUTEROL SULFATE 2.5 MG/3ML
2.5 SOLUTION RESPIRATORY (INHALATION) EVERY 6 HOURS PRN
Status: DISCONTINUED | OUTPATIENT
Start: 2017-09-18 | End: 2017-09-20 | Stop reason: HOSPADM

## 2017-09-18 RX ORDER — ACETAMINOPHEN 325 MG/1
650 TABLET ORAL EVERY 4 HOURS PRN
Status: DISCONTINUED | OUTPATIENT
Start: 2017-09-18 | End: 2017-09-20 | Stop reason: HOSPADM

## 2017-09-18 RX ORDER — SODIUM CHLORIDE 0.9 % (FLUSH) 0.9 %
1-10 SYRINGE (ML) INJECTION AS NEEDED
Status: CANCELLED | OUTPATIENT
Start: 2017-09-18

## 2017-09-18 RX ORDER — SODIUM CHLORIDE, SODIUM LACTATE, POTASSIUM CHLORIDE, CALCIUM CHLORIDE 600; 310; 30; 20 MG/100ML; MG/100ML; MG/100ML; MG/100ML
125 INJECTION, SOLUTION INTRAVENOUS CONTINUOUS
Status: CANCELLED | OUTPATIENT
Start: 2017-09-18

## 2017-09-18 RX ORDER — BUTORPHANOL TARTRATE 1 MG/ML
INJECTION, SOLUTION INTRAMUSCULAR; INTRAVENOUS
Status: COMPLETED
Start: 2017-09-18 | End: 2017-09-18

## 2017-09-18 RX ORDER — SODIUM CHLORIDE 0.9 % (FLUSH) 0.9 %
1-10 SYRINGE (ML) INJECTION AS NEEDED
Status: DISCONTINUED | OUTPATIENT
Start: 2017-09-18 | End: 2017-09-20 | Stop reason: HOSPADM

## 2017-09-18 RX ORDER — BUTORPHANOL TARTRATE 1 MG/ML
1 INJECTION, SOLUTION INTRAMUSCULAR; INTRAVENOUS
Status: DISCONTINUED | OUTPATIENT
Start: 2017-09-18 | End: 2017-09-20 | Stop reason: HOSPADM

## 2017-09-18 RX ORDER — LIDOCAINE HYDROCHLORIDE 10 MG/ML
5 INJECTION, SOLUTION INFILTRATION; PERINEURAL AS NEEDED
Status: DISCONTINUED | OUTPATIENT
Start: 2017-09-18 | End: 2017-09-20 | Stop reason: HOSPADM

## 2017-09-18 RX ORDER — LIDOCAINE HYDROCHLORIDE 10 MG/ML
5 INJECTION, SOLUTION INFILTRATION; PERINEURAL AS NEEDED
Status: CANCELLED | OUTPATIENT
Start: 2017-09-18

## 2017-09-18 RX ORDER — SODIUM CHLORIDE, SODIUM LACTATE, POTASSIUM CHLORIDE, CALCIUM CHLORIDE 600; 310; 30; 20 MG/100ML; MG/100ML; MG/100ML; MG/100ML
125 INJECTION, SOLUTION INTRAVENOUS CONTINUOUS
Status: DISCONTINUED | OUTPATIENT
Start: 2017-09-18 | End: 2017-09-20 | Stop reason: HOSPADM

## 2017-09-18 RX ORDER — ACETAMINOPHEN 325 MG/1
650 TABLET ORAL EVERY 4 HOURS PRN
Status: DISCONTINUED | OUTPATIENT
Start: 2017-09-18 | End: 2017-09-19 | Stop reason: HOSPADM

## 2017-09-18 RX ORDER — MISOPROSTOL 200 UG/1
800 TABLET ORAL AS NEEDED
Status: CANCELLED | OUTPATIENT
Start: 2017-09-18

## 2017-09-18 RX ORDER — OXYTOCIN/0.9 % SODIUM CHLORIDE 30/500 ML
2-16 PLASTIC BAG, INJECTION (ML) INTRAVENOUS
Status: DISCONTINUED | OUTPATIENT
Start: 2017-09-19 | End: 2017-09-20 | Stop reason: HOSPADM

## 2017-09-18 RX ORDER — LIDOCAINE HYDROCHLORIDE 10 MG/ML
INJECTION, SOLUTION EPIDURAL; INFILTRATION; INTRACAUDAL; PERINEURAL
Status: DISPENSED
Start: 2017-09-18 | End: 2017-09-19

## 2017-09-18 RX ORDER — OXYTOCIN/RINGER'S LACTATE 20/1000 ML
PLASTIC BAG, INJECTION (ML) INTRAVENOUS
Status: DISCONTINUED
Start: 2017-09-18 | End: 2017-09-18 | Stop reason: WASHOUT

## 2017-09-18 RX ORDER — SODIUM CHLORIDE, SODIUM LACTATE, POTASSIUM CHLORIDE, AND CALCIUM CHLORIDE .6; .31; .03; .02 G/100ML; G/100ML; G/100ML; G/100ML
1000 INJECTION, SOLUTION INTRAVENOUS ONCE
Status: COMPLETED | OUTPATIENT
Start: 2017-09-18 | End: 2017-09-18

## 2017-09-18 RX ORDER — CARBOPROST TROMETHAMINE 250 UG/ML
250 INJECTION, SOLUTION INTRAMUSCULAR AS NEEDED
Status: DISCONTINUED | OUTPATIENT
Start: 2017-09-18 | End: 2017-09-19 | Stop reason: HOSPADM

## 2017-09-18 RX ORDER — OXYTOCIN/0.9 % SODIUM CHLORIDE 30/500 ML
2-16 PLASTIC BAG, INJECTION (ML) INTRAVENOUS
Status: CANCELLED | OUTPATIENT
Start: 2017-09-19

## 2017-09-18 RX ORDER — OXYTOCIN/RINGER'S LACTATE 20/1000 ML
999 PLASTIC BAG, INJECTION (ML) INTRAVENOUS CONTINUOUS PRN
Status: DISCONTINUED | OUTPATIENT
Start: 2017-09-18 | End: 2017-09-20 | Stop reason: HOSPADM

## 2017-09-18 RX ORDER — MISOPROSTOL 200 UG/1
800 TABLET ORAL AS NEEDED
Status: DISCONTINUED | OUTPATIENT
Start: 2017-09-18 | End: 2017-09-19 | Stop reason: HOSPADM

## 2017-09-18 RX ORDER — METHYLERGONOVINE MALEATE 0.2 MG/ML
200 INJECTION INTRAVENOUS ONCE AS NEEDED
Status: CANCELLED | OUTPATIENT
Start: 2017-09-18

## 2017-09-18 RX ADMIN — SODIUM CHLORIDE, POTASSIUM CHLORIDE, SODIUM LACTATE AND CALCIUM CHLORIDE 125 ML/HR: 600; 310; 30; 20 INJECTION, SOLUTION INTRAVENOUS at 20:50

## 2017-09-18 RX ADMIN — SODIUM CHLORIDE, SODIUM LACTATE, POTASSIUM CHLORIDE, AND CALCIUM CHLORIDE 1000 ML: .6; .31; .03; .02 INJECTION, SOLUTION INTRAVENOUS at 15:47

## 2017-09-18 RX ADMIN — SODIUM CHLORIDE, POTASSIUM CHLORIDE, SODIUM LACTATE AND CALCIUM CHLORIDE 1000 ML: 600; 310; 30; 20 INJECTION, SOLUTION INTRAVENOUS at 15:47

## 2017-09-18 RX ADMIN — ZOLPIDEM TARTRATE 5 MG: 5 TABLET ORAL at 19:04

## 2017-09-18 RX ADMIN — BUTORPHANOL TARTRATE 1 MG: 1 INJECTION, SOLUTION INTRAMUSCULAR; INTRAVENOUS at 21:17

## 2017-09-18 RX ADMIN — SODIUM CHLORIDE, SODIUM LACTATE, POTASSIUM CHLORIDE, CALCIUM CHLORIDE 125 ML/HR: 600; 310; 30; 20 INJECTION, SOLUTION INTRAVENOUS at 20:50

## 2017-09-18 RX ADMIN — DINOPROSTONE 10 MG: 10 INSERT VAGINAL at 16:01

## 2017-09-18 RX ADMIN — ZOLPIDEM TARTRATE 5 MG: 5 TABLET, FILM COATED ORAL at 19:04

## 2017-09-18 RX ADMIN — Medication 999 ML/HR: at 23:55

## 2017-09-18 NOTE — PROGRESS NOTES
CC: KYM visit, history reviewed see history tabs.     ROS: Negative leaking fluid from the vagina, swelling in her legs, headache, visual changes, low back pain and heartburn    Objective: BPP 8/8 , cervix 1.5 cm NST reactive    Educated on:MFM recommended IOL due to grade 3 placenta, discussed cervidil IOL    A/Plan: IOL today, per [facility] recommendations, dr. Aguirre notified of [facility] recommendations   Ligia was seen today for routine prenatal visit.    Diagnoses and all orders for this visit:    LGSIL on Pap smear of cervix    Depression affecting pregnancy in third trimester, antepartum    40 weeks gestation of pregnancy

## 2017-09-18 NOTE — H&P
[facility]  Obstetric History and Physical    No chief complaint on file.      Subjective     Patient is a 32 y.o.  currently at 40w0d, who presents with for IOL due to DR Mckeon's MFM recomendation due to placenta grade 3. . Reports + fetal movement. Denies vaginal bleeding. Desires epidural for pain control in her labor.    Her prenatal care is complicated by LSIL pap smear at the beginning of the pregnancy f/u at 28 weeks negative.  Her previous obstetric/gynecological history is noted for is non-contributory.    The following portions of the patients history were reviewed and updated as appropriate: current medications, allergies, past medical history, past surgical history, past family history, past social history and problem list .      Past OB History:     Obstetric History       T0      TAB0   SAB0   E0   M0   L0       # Outcome Date GA Lbr Azam/2nd Weight Sex Delivery Anes PTL Lv   1 Current                   Past Medical History: Past Medical History:   Diagnosis Date   • Acute otitis externa 10/14/2015   • Acute sinusitis 2015   • Allergic rhinitis 2015   • Anxiety 2016   • Asthma    • Bleeding hemorrhoids 2015   • Depression    • Headache 10/20/2015   • Impacted cerumen 10/14/2015   • Irregular menstruation 2015   • Irregular periods 2016   • Knee pain 10/06/2015   • Loss of hair 2016   • Nonvenomous insect bite 2016   • Pain in eye 10/20/2015   • Palpitations    • Paresthesia 2015   • Pregnancy     A0   • Primary fibromyalgia 2016   • Shoulder joint pain 10/20/2015   • Tremor 2015      Past Surgical History Past Surgical History:   Procedure Laterality Date   • ENDOSCOPY  2016    Normal   • MOUTH SURGERY      [place] teeth extraction      Family History: Family History   Problem Relation Age of Onset   • Hypertension Mother    • Hypertension Father    • Migraines Father    • Hypertension Other    • Asthma  Brother    • Migraines Brother    • Heart disease Sister       Social History:  reports that she quit smoking about 7 months ago. She has never used smokeless tobacco.   reports that she does not drink alcohol.   reports that she does not use illicit drugs.      Current Facility-Administered Medications:   •  acetaminophen (TYLENOL) tablet 650 mg, 650 mg, Oral, Q4H PRN, CATHERINE Meehan  •  influenza vac split quad (FLUZONE QUADRIVALENT) IM suspension 0.5 mL, 0.5 mL, Intramuscular, During Hospitalization, CATHERINE Meehan  •  lactated ringers infusion, 125 mL/hr, Intravenous, Continuous, CATHERINE Meehan  •  lidocaine (XYLOCAINE) 1 % injection 5 mL, 5 mL, Intradermal, PRN, CATHERINE Meehan  •  [START ON 9/19/2017] Oxytocin-Sodium Chloride (PITOCIN) 30-0.9 UT/500ML-% infusion solution, 2-16 eron-units/min, Intravenous, Titrated, CATHERINE Meehan  •  sodium chloride 0.9 % flush 1-10 mL, 1-10 mL, Intravenous, PRN, CATHERINE Meehan   Allergies   Allergen Reactions   • Adhesive Tape    • Fish-Derived Products      Home medication  Order  albuterol (PROVENTIL HFA;VENTOLIN HFA) inhaler 2 puff  2 puff, Inhalation, Every 6 Hours PRN, Wheezing, Shortness of Air, Starting Mon 9/18/17 at 1633  [facility].   This order was created from albuterol (PROVENTIL HFA;VENTOLIN HFA) 108 (90 BASE) MCG/ACT inhaler  Don't Order (Edit)  diphenhydrAMINE (BENADRYL) 12.5 MG/5ML elixir  Take  by mouth 4 (Four) Times a Day As Needed for Itching. Last Dose: Past Month at Unknown time  Don't Order (Edit)  docusate sodium (COLACE) 100 MG capsule  Take 1 capsule by mouth Daily As Needed for Constipation., Starting 5/18/2017, Until Discontinued, Normal Last Dose: Past Month at Unknown time  Refills: 10 ordered     Pharmacy: [facility] [place] [address] AT [address] [phone] [facility] [phone] FX  Don't Order (Edit)  Prenatal Multivit-Min-Fe-FA (PRENATAL VITAMINS PO)  Take  by  mouth. Last Dose: 9/17/2017 at Unknown time  Don't Order (Edit)  QVAR 40 MCG/ACT inhaler  Inhale 40 mcg 2 (Two) Times a Day. Last Dose: Past Month at Unknown time  Note written 4/28/2017 1456: Received from: External Pharmacy (Edit Note) (Remove Note)          General ROS: The following systems were reviewed and negative;  constitution, eyes, ENT, cardiovascular, gastrointestinal, genitourinary, integument, hematologic / lymphatic, musculoskeletal, neurological, behavioral/psych, endocrine and allergies / immunologic    Prenatal Information:  Prenatal Results         1st Trimester Ref. Range Date Time   CBC with auto diff       Rubella IgG       Hepatitis B SAg  Negative  Negative 02/07/17 1438   RPR  Non-Reactive  Non-Reactive 02/07/17 1438   ABO  A   02/07/17 1438   Rh  Positive   02/07/17 1438   Anibody Screen  Negative   02/07/17 1438   HIV       Varicella IgG       Urinalysis with microscopy       Urine Culture       GC/Chlamydia/TV       ThinPrep/Pap       2nd and 3rd Trimester Ref. Range Date Time   Hemoglobin / Hematocrit  37.0 % 35.0 - 45.0 % 09/18/17 1546   Hemoglobin  12.6 g/dL 12.0 - 15.5 g/dL 09/18/17 1546   Group B Strep Screen  Negative  Negative 08/24/17 1405   Group B Strep Culture       GCT  105 mg/dL 60 - 140 mg/dL 06/15/17 1036   Glucose Fasting       Glucose 1 Hr       Glucose 2 Hr       Glucose 3 Hr       Pre-eclampsia Panel       Risk Screening Ref. Range Date Time   Fetal Fibronectin       Amnisure       Hepatitis C Antibody       Hemoglobin electrophoresis       Cystic Fibrosis       Hemoglobin A1C       MSAFP - 4       NIPT       AFP  51.4 ng/mL ng/mL 04/18/17 1004   Parvovirus IgG       Parvovirus IgM       POCT - glucose       [facility]       24 Hour urine - Total protein       24 Hour urine - Creatinine clearance       Urinalysis with microscopy       Urine Culture       Drug Screening Ref. Range Date Time   Amphetamine Screen  Negative  Negative 08/23/17 1403   Barbiturate Screen   Negative  Negative 08/23/17 1403   Benzodiazepine Screen  Negative  Negative 08/23/17 1403   Methadone Screen  Negative  Negative 08/23/17 1403   Phencyclidine Screen       Opiates Screen  Negative  Negative 08/23/17 1403   THC Screen  Negative  Negative 08/23/17 1403   Cocaine Screen  Negative  Negative 08/23/17 1403   Propoxyphene Screen       Buprenorphine Screen       Methamphetamine Screen       Oxycodone Screen  Negative  Negative 08/23/17 1403   Tryicyclic Antidepressants Screen              Legend: ^: Historical            View all results for this pregnancy            Objective       Vital Signs Range for the last 24 hours  Temperature: Temp:  [98.8 °F (37.1 °C)] 98.8 °F (37.1 °C)   Temp Source: Temp src: Oral   BP: BP: (124-137)/(84-90) 137/90   Pulse: Heart Rate:  [80] 80   Respirations: Resp:  [20] 20   SPO2: SpO2:  [97 %] 97 %   O2 Amount (l/min):     O2 Devices     Weight: Weight:  [170 lb (77.1 kg)] 170 lb (77.1 kg)     Physical Examination: General appearance - alert, well appearing, and in no distress  Chest - clear to auscultation, no wheezes, rales or rhonchi, symmetric air entry  Heart - normal rate, regular rhythm, normal S1, S2, no murmurs, rubs, clicks or gallops  Abdomen - soft, nontender, nondistended, gravid  Pelvic - normal external genitalia, vulva,   Neurological - alert, oriented, normal speech, no focal findings or movement disorder noted  Musculoskeletal - no joint tenderness, deformity or swelling  Extremities - peripheral pulses normal, no pedal edema, no clubbing or cyanosis  Skin - normal coloration and turgor, no rashes, no suspicious skin lesions noted     Estimated Fetal Weight: 7-5 lbs  Presentation: vertex   Cervix: Exam by:  Levy benitez   Dilation:  1.5 cm   Effacement:  60   Station:  -1     Fetal Heart Rate Assessment   Method: Fetal HR Assessment Method: external   Beats/min: Fetal HR (Beats/Min): 140   Baseline: Fetal HR Baseline: normal range (110-160 bpm)    Varibility: Fetal HR Variability: moderate (amplitude range 6 to 25 bpm)   Accels: Fetal HR Accelerations: lasting at least 15 seconds, greater than/equal to 15 bpm   Decels: Fetal HR Decelerations: absent   Tracing Category: Fetal HR Tracing Category: Category I     Uterine Assessment   Method: Method: TOCO (external toco transducer)   Frequency (min): Contraction Frequency (min): 2-6   Duration: Contraction Duration (sec): 60-90   Intensity: Contraction Intensity: mild by palpation   Resting Tone: Uterine Resting Tone: soft by palpation     Laboratory Results:       Assessment/Plan     Active Problems:  IOl for grade3 placenta      Assessment:  1.  Intrauterine pregnancy at 40w0d weeks gestation with reactive fetal status.    2.  induction of labor  for Grade 3 placenta    3.  Obstetrical history significant for is non-contributory.  4.  GBS status: negative  Plan:  1. fetal and uterine monitoring  continuously and cervical ripening with  Cervidil  2. Plan of care has been reviewed with patient and she agrees  3.  Risks, benefits of treatment plan have been discussed.  4.  All questions have been answered.  5.  Dr. Aguirre aware of patient status       CATHERINE Meehan          This document has been electronically signed by CATHERINE Meehan on September 18, 2017 4:23 PM

## 2017-09-18 NOTE — PLAN OF CARE
Problem: Patient Care Overview (Adult)  Goal: Plan of Care Review  Outcome: Ongoing (interventions implemented as appropriate)    09/18/17 1714   Coping/Psychosocial Response Interventions   Plan Of Care Reviewed With patient;significant other   Patient Care Overview   Progress no change   Outcome Evaluation   Outcome Summary/Follow up Plan VSS. SVE 1.5, 60%. Admission complete. Cervidil paced at 1602       Goal: Adult Individualization and Mutuality  Outcome: Ongoing (interventions implemented as appropriate)  Goal: Discharge Needs Assessment  Outcome: Ongoing (interventions implemented as appropriate)    Problem: Labor (Cervical Ripen, Induct, Augment) (Adult,Obstetrics,Pediatric)  Goal: Signs and Symptoms of Listed Potential Problems Will be Absent or Manageable (Labor)  Outcome: Ongoing (interventions implemented as appropriate)

## 2017-09-19 PROCEDURE — 25010000002 PNEUMOCOCCAL VAC POLYVALENT PER 0.5 ML: Performed by: OBSTETRICS & GYNECOLOGY

## 2017-09-19 PROCEDURE — 90682 RIV4 VACC RECOMBINANT DNA IM: CPT | Performed by: ADVANCED PRACTICE MIDWIFE

## 2017-09-19 PROCEDURE — 90732 PPSV23 VACC 2 YRS+ SUBQ/IM: CPT | Performed by: OBSTETRICS & GYNECOLOGY

## 2017-09-19 PROCEDURE — 25010000002 INFLUENZA VAC SPLIT QUAD SUSPENSION: Performed by: ADVANCED PRACTICE MIDWIFE

## 2017-09-19 PROCEDURE — G0009 ADMIN PNEUMOCOCCAL VACCINE: HCPCS | Performed by: OBSTETRICS & GYNECOLOGY

## 2017-09-19 PROCEDURE — G0008 ADMIN INFLUENZA VIRUS VAC: HCPCS | Performed by: ADVANCED PRACTICE MIDWIFE

## 2017-09-19 RX ORDER — OXYTOCIN/RINGER'S LACTATE 20/1000 ML
PLASTIC BAG, INJECTION (ML) INTRAVENOUS
Status: DISCONTINUED
Start: 2017-09-19 | End: 2017-09-19 | Stop reason: WASHOUT

## 2017-09-19 RX ORDER — IBUPROFEN 600 MG/1
600 TABLET ORAL EVERY 6 HOURS SCHEDULED
Status: DISCONTINUED | OUTPATIENT
Start: 2017-09-19 | End: 2017-09-20 | Stop reason: HOSPADM

## 2017-09-19 RX ORDER — LANOLIN 100 %
OINTMENT (GRAM) TOPICAL AS NEEDED
Status: DISCONTINUED | OUTPATIENT
Start: 2017-09-19 | End: 2017-09-20 | Stop reason: HOSPADM

## 2017-09-19 RX ORDER — LANOLIN 100 %
OINTMENT (GRAM) TOPICAL
Status: COMPLETED
Start: 2017-09-19 | End: 2017-09-19

## 2017-09-19 RX ORDER — OXYTOCIN/RINGER'S LACTATE 20/1000 ML
PLASTIC BAG, INJECTION (ML) INTRAVENOUS
Status: COMPLETED
Start: 2017-09-19 | End: 2017-09-19

## 2017-09-19 RX ORDER — ACETAMINOPHEN 325 MG/1
TABLET ORAL
Status: COMPLETED
Start: 2017-09-19 | End: 2017-09-19

## 2017-09-19 RX ADMIN — IBUPROFEN 600 MG: 600 TABLET ORAL at 13:37

## 2017-09-19 RX ADMIN — PNEUMOCOCCAL VACCINE POLYVALENT 0.5 ML
25; 25; 25; 25; 25; 25; 25; 25; 25; 25; 25; 25; 25; 25; 25; 25; 25; 25; 25; 25; 25; 25; 25 INJECTION, SOLUTION INTRAMUSCULAR; SUBCUTANEOUS at 10:53

## 2017-09-19 RX ADMIN — IBUPROFEN 600 MG: 600 TABLET ORAL at 05:00

## 2017-09-19 RX ADMIN — ACETAMINOPHEN 650 MG: 325 TABLET ORAL at 00:04

## 2017-09-19 RX ADMIN — Medication 999 ML/HR: at 01:14

## 2017-09-19 RX ADMIN — BENZOCAINE AND MENTHOL: 20; .5 SPRAY TOPICAL at 01:42

## 2017-09-19 RX ADMIN — IBUPROFEN 600 MG: 600 TABLET ORAL at 18:10

## 2017-09-19 RX ADMIN — Medication: at 01:43

## 2017-09-19 RX ADMIN — INFLUENZA A VIRUS A/MICHIGAN/45/2015 X-275 (H1N1) ANTIGEN (FORMALDEHYDE INACTIVATED), INFLUENZA A VIRUS A/HONG KONG/4801/2014 X-263B (H3N2) ANTIGEN (FORMALDEHYDE INACTIVATED), INFLUENZA B VIRUS B/PHUKET/3073/2013 ANTIGEN (FORMALDEHYDE INACTIVATED), AND INFLUENZA B VIRUS B/BRISBANE/60/2008 ANTIGEN (FORMALDEHYDE INACTIVATED) 0.5 ML: 15; 15; 15; 15 INJECTION, SUSPENSION INTRAMUSCULAR at 10:59

## 2017-09-19 NOTE — PROGRESS NOTES
POSTPARTUM PROGRESS NOTE  NAME: Ligia Hay  : 1985  MRN: [id number]      LOS: 1 day     Chief Complaint: no complaints     Subjective:     Interval History:  Pain well controlled with medications, (+) Flatus, - BM, lochia minimal, (+) voiding, (+) ambulation.  Desires OCPs for birth control. Breastfeeding.       Objective:     Vital Signs  Temp:  [98.2 °F (36.8 °C)-98.8 °F (37.1 °C)] 98.8 °F (37.1 °C)  Heart Rate:  [63-91] 82  Resp:  [18-20] 18  BP: (120-148)/(70-92) 127/70    Physical Exam  GEN: A&O x3, NAD  CVS: RRR, S1/S2, no m/g/r  LUNGS: CTAB, no wheezes, no rhonchi  ABD: Soft, Nontender  Fundus: firm below umbilicus   EXT: no edema, no calf tenderness bilaterally.    Medication Review    Current Facility-Administered Medications:   •  acetaminophen (TYLENOL) tablet 650 mg, 650 mg, Oral, Q4H PRN, CATHERINE Meehan  •  albuterol (PROVENTIL) nebulizer solution 0.083% 2.5 mg/3mL, 2.5 mg, Nebulization, Q6H PRN, CATHERINE Meehan  •  benzocaine-lanolin-aloe vera (DERMOPLAST) 20-0.5 % topical spray, , Topical, PRN, Celso Aguirre MD  •  butorphanol (STADOL) injection 1 mg, 1 mg, Intravenous, Q2H PRN, Celso Aguirre MD, 1 mg at 17  •  influenza vac split quad (FLUZONE QUADRIVALENT) IM suspension 0.5 mL, 0.5 mL, Intramuscular, During Hospitalization, CATHERINE Meehan  •  lactated ringers infusion, 125 mL/hr, Intravenous, Continuous, CATHERINE Meehan, Last Rate: 125 mL/hr at 17, 125 mL/hr at 17  •  lanolin ointment, , Topical, PRN, Celso Aguirre MD  •  lidocaine (XYLOCAINE) 1 % injection 5 mL, 5 mL, Intradermal, PRN, CATHERINE Meehan  •  lidocaine PF 1% (XYLOCAINE) 1 % injection  - ADS Override Pull, , , ,   •  Oxytocin-Lactated Ringers (PITOCIN) 20 UNIT/L in lactated Ringer's 1000 mL IVPB, 999 mL/hr, Intravenous, Continuous PRN, Celso Aguirre MD, Last Rate: 999 mL/hr at 17, 999 mL/hr at 17  •   Oxytocin-Sodium Chloride (PITOCIN) 30-0.9 UT/500ML-% infusion solution, 2-16 eron-units/min, Intravenous, Titrated, CATHERINE Meehan  •  pneumococcal polysaccharide 23-valent (PNEUMOVAX-23) vaccine 0.5 mL, 0.5 mL, Intramuscular, During Hospitalization, Celso Aguirre MD  •  sodium chloride 0.9 % flush 1-10 mL, 1-10 mL, Intravenous, PRN, CATHERINE Meehan  •  Tdap (BOOSTRIX) injection 0.5 mL, 0.5 mL, Intramuscular, During Hospitalization, Celso Aguirre MD  •  zolpidem (AMBIEN) tablet 5 mg, 5 mg, Oral, Nightly PRN, Celso Aguirre MD, 5 mg at 09/18/17 1904     Diagnostic Data    Lab Results (last 24 hours)     Procedure Component Value Units Date/Time    CBC (No Diff) [[id number]]  (Normal) Collected:  09/18/17 1546    Specimen:  Blood Updated:  09/18/17 1603     WBC 9.06 10*3/mm3      RBC 4.06 10*6/mm3      Hemoglobin 12.6 g/dL      Hematocrit 37.0 %      MCV 91.1 fL      MCH 31.0 pg      MCHC 34.1 g/dL      RDW 12.7 %      RDW-SD 42.1 fl      MPV 11.0 fL      Platelets 199 10*3/mm3     Urine Drug Screen [[id number]]  (Normal) Collected:  09/18/17 1546    Specimen:  Urine Updated:  09/18/17 1627     Amphetamine Screen, Urine Negative     Barbiturates Screen, Urine Negative     Benzodiazepine Screen, Urine Negative     Cocaine Screen, Urine Negative     Methadone Screen, Urine Negative     Opiate Screen Negative     Oxycodone Screen, Urine Negative     THC, Screen, Urine Negative    Narrative:       Negative Thresholds For Drugs Screened in Urine:     Amphetamines          500 ng/ml  Barbiturates          200 ng/ml  Benzodiazepines       200 ng/ml  Cocaine               150 ng/ml  Methadone             300 ng/mL  Opiates               300 ng/mL  Oxycodone             100 ng/mL  THC                   20 ng/mL    The normal value for all drugs tested is negative. This report includes final unconfirmed screening results.  A positive result by this assay can be, at your request, sent to the Reference Lab  for confirmation by gas chromatography. Unconfirmed results must not be used for non-medical purposes, such as employment or legal testing. Clinical consideration should be applied to any drug of abuse test result, particularly when unconfirmed results are used.          I reviewed the patient's new clinical results.    Assessment/Plan:     Ligia Camarillo Satnam 32 y.o.  s/p spontaneous vaginal delivery PPD #1   1. Encourage ambulation  2. Continue routine postpartum care.    Plan for disposition: Discharge home in 1 day.               This document has been electronically signed by Natty Oquendo MD on 2017 6:06 AM

## 2017-09-19 NOTE — PROGRESS NOTES
Was called and asked to review strip due to fetal bradycardia.     The strip shows tachysystole preceeding the deceleration and the Cervidil was removed. The strip shows good recovery     FHTs 130 with mod variability, no accelerations and no decelerations    Plan: continue to watch strip closely.             Cervidil will not be replaced.          This document has been electronically signed by Celso Aguirre MD on September 18, 2017 8:56 PM

## 2017-09-19 NOTE — PLAN OF CARE
Problem: Breastfeeding (Adult,NICU,,Obstetrics,Pediatric)  Goal: Signs and Symptoms of Listed Potential Problems Will be Absent or Manageable (Breastfeeding)  Outcome: Ongoing (interventions implemented as appropriate)  Infant is still in the 24 hour window. Mother is improving with bringing the infant to the breast. Infant still seems confused at the breast but was able to latch on and nurse effectively this feeding.

## 2017-09-19 NOTE — PROGRESS NOTES
[facility]  Ligia Hay  : 1985  MRN: [id number]  CSN: [id number]    Labor progress note    c/o urge to push    Min/max vitals past 24 hours:  Temp  Min: 98.5 °F (36.9 °C)  Max: 98.8 °F (37.1 °C)   BP  Min: 124/84  Max: 139/92   Pulse  Min: 73  Max: 89   Resp  Min: 20  Max: 20        FHT's: Baseline [id number], moderate variability and (+) Accelerations   Cervix: was checked:10/ 100/ vertex/ -2   Contractions: regular         Plan   1. Begin to push    Celso Aguirre MD  2017  10:52 PM

## 2017-09-19 NOTE — PROGRESS NOTES
[facility]  Ligia Hay  : 1985  MRN: [id number]  CSN: [id number]    Labor progress note    c/o contraction pains, would like pain meds    Min/max vitals past 24 hours:  Temp  Min: 98.5 °F (36.9 °C)  Max: 98.8 °F (37.1 °C)   BP  Min: 124/84  Max: 139/92   Pulse  Min: 73  Max: 87   Resp  Min: 20  Max: 20        FHT's: Baseline [id number], moderate variability and (+) Accelerations, much improved   Cervix: was not examined   Contractions: regular         Plan   1. Expectant management overnight  2. Pain meds per patient desire  3. Continue to watch strip closely    Celso Aguirre MD  2017  9:11 PM

## 2017-09-19 NOTE — PLAN OF CARE
Problem: Patient Care Overview (Adult)  Goal: Plan of Care Review  Outcome: Ongoing (interventions implemented as appropriate)    17 0324   Coping/Psychosocial Response Interventions   Plan Of Care Reviewed With patient   Patient Care Overview   Progress improving   Outcome Evaluation   Outcome Summary/Follow up Plan VSS, ambulating well to bathroom, voiding, pain controlled with PRN meds       Goal: Adult Individualization and Mutuality  Outcome: Ongoing (interventions implemented as appropriate)  Goal: Discharge Needs Assessment  Outcome: Ongoing (interventions implemented as appropriate)    Problem: Breastfeeding (Adult,NICU,[place],Obstetrics,Pediatric)  Goal: Signs and Symptoms of Listed Potential Problems Will be Absent or Manageable (Breastfeeding)  Outcome: Ongoing (interventions implemented as appropriate)    Problem: Postpartum, Vaginal Delivery (Adult)  Goal: Signs and Symptoms of Listed Potential Problems Will be Absent or Manageable (Postpartum, Vaginal Delivery)  Outcome: Ongoing (interventions implemented as appropriate)

## 2017-09-19 NOTE — LACTATION NOTE
This note was copied from a baby's chart.  Education give to mother about positioning and breastfeeding basics. Mother is improving with bringing the infant to the breast. The was a fair breastfeeding session.

## 2017-09-19 NOTE — PLAN OF CARE
Problem: Patient Care Overview (Adult)  Goal: Plan of Care Review  Outcome: Ongoing (interventions implemented as appropriate)    17 [id number]   Coping/Psychosocial Response Interventions   Plan Of Care Reviewed With patient   Patient Care Overview   Progress improving   Outcome Evaluation   Outcome Summary/Follow up Plan VSS, ambulating, voiding, pain controlled with PRN pain meds. Fundus Firm, lochia rubra small.        Goal: Adult Individualization and Mutuality  Outcome: Ongoing (interventions implemented as appropriate)  Goal: Discharge Needs Assessment  Outcome: Ongoing (interventions implemented as appropriate)    Problem: Breastfeeding (Adult,NICU,[place],Obstetrics,Pediatric)  Goal: Signs and Symptoms of Listed Potential Problems Will be Absent or Manageable (Breastfeeding)  Outcome: Ongoing (interventions implemented as appropriate)    Problem: Postpartum, Vaginal Delivery (Adult)  Goal: Signs and Symptoms of Listed Potential Problems Will be Absent or Manageable (Postpartum, Vaginal Delivery)  Outcome: Ongoing (interventions implemented as appropriate)

## 2017-09-19 NOTE — L&D DELIVERY NOTE
[facility]  Vaginal Delivery Note    Delivery     Delivery: Vaginal, Spontaneous Delivery     YOB: 2016    Time of Birth: 11:08 PM      Anesthesia: None     Delivering clinician: Celso Aguirre    Forceps?   No   Vacuum? No    Shoulder dystocia present: No        Delivery narrative:  The pt complained of pressure and of wanting to push. She was checked and was fully dilated. AROM was performed. Clear fluid was noted. She got the vertex to  over several contractions and then easily delivered the vertex. There was a tight nuchal cord. The baby was delivered through it as the mother continued to push. The right shoulder then presented anteriorly followed by the left shoulder. The remainder of the infant was easily delivered and he was placed on the maternal abdomen. The cord was doubly clamped and cut. A segment of cord was sent for cord gasses. Cord blood was obtained. There was a  of an intact daly placenta with 2 arteries and 1 vein. A long 2nd laceration was noted from the posterior fourchette to the anus. This was repaired in layers using 2-0 vicryl. EBL is 300 cc.    Infant    Findings: male  infant     Infant observations: Weight: 7 lb 9 oz (3.43 kg)   Length: 20  in  Observations/Comments:         Apgars: 8   @ 1 minute /    9   @ 5 minutes   Infant Name:      Placenta, Cord, and Fluid    Placenta delivered  Spontaneous  at    11:13 PM     Cord: 3 vessels  present.   Nuchal Cord?  yes; Number of nuchal loops present:    1       Cord blood obtained: Yes    Cord gases obtained:  Yes    Cord gas results: Venous:  No results found for: PHCVEN    Arterial:  No results found for: PHCART     Repair    Episiotomy: None    Lacerations: Yes  Laceration Information  Laceration Repaired?   Perineal: 2nd degree yes   Periurethral:         Labial:         Sulcus:         Vaginal:         Cervical:              Estimated Blood Loss: Est. Blood Loss (mL): 300 mL (Filed from Delivery  Summary) (09/18/17 1013)     Suture used for repair: 2 Vicryl and 2 Vicryl     Complications  none    Disposition  Mother to Mother Baby/Postpartum  in stable condition currently.  Baby to remains with mom  in stable condition currently.    Celso Aguirre MD  09/18/17  11:30 PM

## 2017-09-19 NOTE — LACTATION NOTE
This note was copied from a baby's chart.  Attempted to BF at 0930. Infant rooting but falls asleep at breast. Encouraged mother to do a lot of skin to skin today to encourage breast attachment.

## 2017-09-20 VITALS
DIASTOLIC BLOOD PRESSURE: 73 MMHG | WEIGHT: 170 LBS | OXYGEN SATURATION: 99 % | TEMPERATURE: 97.8 F | SYSTOLIC BLOOD PRESSURE: 122 MMHG | BODY MASS INDEX: 27.32 KG/M2 | HEART RATE: 87 BPM | RESPIRATION RATE: 20 BRPM | HEIGHT: 66 IN

## 2017-09-20 PROBLEM — O48.0 POST TERM PREGNANCY, UNSPECIFIED EPISODE OF CARE: Status: RESOLVED | Noted: 2017-09-18 | Resolved: 2017-09-20

## 2017-09-20 PROCEDURE — 90715 TDAP VACCINE 7 YRS/> IM: CPT | Performed by: OBSTETRICS & GYNECOLOGY

## 2017-09-20 PROCEDURE — 90471 IMMUNIZATION ADMIN: CPT | Performed by: OBSTETRICS & GYNECOLOGY

## 2017-09-20 PROCEDURE — 25010000002 TDAP 5-2.5-18.5 LF-MCG/0.5 SUSPENSION: Performed by: OBSTETRICS & GYNECOLOGY

## 2017-09-20 RX ORDER — IBUPROFEN 600 MG/1
600 TABLET ORAL EVERY 6 HOURS SCHEDULED
Qty: 30 TABLET | Refills: 0 | Status: SHIPPED | OUTPATIENT
Start: 2017-09-20 | End: 2018-12-03

## 2017-09-20 RX ADMIN — IBUPROFEN 600 MG: 600 TABLET ORAL at 11:58

## 2017-09-20 RX ADMIN — Medication: at 12:08

## 2017-09-20 RX ADMIN — IBUPROFEN 600 MG: 600 TABLET ORAL at 00:52

## 2017-09-20 RX ADMIN — TETANUS TOXOID, REDUCED DIPHTHERIA TOXOID AND ACELLULAR PERTUSSIS VACCINE, ADSORBED 0.5 ML: 5; 2.5; 8; 8; 2.5 SUSPENSION INTRAMUSCULAR at 12:30

## 2017-09-20 RX ADMIN — IBUPROFEN 600 MG: 600 TABLET ORAL at 05:46

## 2017-09-20 RX ADMIN — ACETAMINOPHEN 650 MG: 325 TABLET ORAL at 09:27

## 2017-09-20 RX ADMIN — BENZOCAINE AND MENTHOL: 20; .5 SPRAY TOPICAL at 09:27

## 2017-09-20 NOTE — DISCHARGE INSTRUCTIONS
Notify Physician or CNM of heavy bleeding, passing clots or foul odor to your discharge  Temp above 100.4. Burning on urination, gapping or drainage from incision or episiotomy, pain not relieved by your pain meds, redness or streaking in your breast or pain in your legs.  Take medication as prescribed  Continue taking your iron or vitamins till your refills run out or as long as you are breastfeeding  Take several rest periods during the day  Baby blues are normal, and may be present around the 3rd-4th day, if they last longer than 2-3 days contact your physician.  Pelvic Rest- No douching tampons or intercourse for 6 weeks  No lifting anything heavier than the baby  Wear a good supportive bra 24 hrs a day to prevent engorgement.  No driving the 1st 2 weeks or as long as you are taking pain meds

## 2017-09-20 NOTE — PLAN OF CARE
Problem: Patient Care Overview (Adult)  Goal: Plan of Care Review  Outcome: Ongoing (interventions implemented as appropriate)    17 0309   Coping/Psychosocial Response Interventions   Plan Of Care Reviewed With patient   Patient Care Overview   Progress improving   Outcome Evaluation   Outcome Summary/Follow up Plan VSS, showered, pain controlled, FF, lochia light       Goal: Adult Individualization and Mutuality  Outcome: Ongoing (interventions implemented as appropriate)  Goal: Discharge Needs Assessment  Outcome: Ongoing (interventions implemented as appropriate)    Problem: Breastfeeding (Adult,NICU,,Obstetrics,Pediatric)  Goal: Signs and Symptoms of Listed Potential Problems Will be Absent or Manageable (Breastfeeding)  Outcome: Ongoing (interventions implemented as appropriate)    Problem: Postpartum, Vaginal Delivery (Adult)  Goal: Signs and Symptoms of Listed Potential Problems Will be Absent or Manageable (Postpartum, Vaginal Delivery)  Outcome: Ongoing (interventions implemented as appropriate)

## 2017-09-20 NOTE — LACTATION NOTE
This note was copied from a baby's chart.  Infant latching well. Mother reports some nipple pain. Encouraged the mother to make sure infants mouth is opened wide before offering the breast and to ensure nipple pain subsides with in 30 seconds. If not, mother needs to break latch and redo. Mother verbalized understanding.

## 2017-09-20 NOTE — DISCHARGE SUMMARY
OBSTETRICS DISCHARGE SUMMARY    NAME: Ligia Hay    ADMITTED: 2017  : 1985     DISCHARGED: 17  MRN: [id number]    ADMISSION DIAGNOSES:  1. Intrauterine pregnancy at 39wk0d GA  2. Gestational diabetes mellitus  DISCHARGE DIAGNOSES:  1. S/p spontaneous vaginal delivery      PROCEDURES: Vaginal, Spontaneous Delivery   DELIVERING PHYSICIAN: Celso Aguirre MD    HISTORY AND HOSPITAL COURSE:    Patient is a 32 y.o. female  who presented at 39w0d GA for induction of labor due to MFM recommendation due to placenta grade 3.  Estimated Date of Delivery: 17. Her pregnancy was complicated by LSIL pap smear beginning of the pregnancy and follow-up at 28 weeks was negative. Please see H&P for full details.     She was admitted and progressed in labor with pitocin augmentation and epidural analgesia to completely dilated.  She had a  of a viable male infant, 8ohn2ms, Apgars 8/9.  Patient had a second degree laceration that was repaired.  No immediate complications were encountered.  Please see procedure note for full details.    Her postpartum course has been unremarkable.  Antepartem H/H was 12.6/37.0. She had no signs or symptoms of acute blood loss anemia.  She was ambulating well, voiding without difficulty and lochia was within normal limits. She is breastfeeding well without difficulty.  She was stable for discharge on PPD#2.    DISCHARGE CONDITION: Stable    DISPOSITION: Home    DISCHARGE MEDICATIONS   Ligia Hay   Home Medication Instructions CHILO:[id number]    Printed on:17 0555   Medication Information                      albuterol (PROVENTIL HFA;VENTOLIN HFA) 108 (90 BASE) MCG/ACT inhaler  Inhale 2 puffs Every 6 (Six) Hours As Needed for wheezing or shortness of air.             diphenhydrAMINE (BENADRYL) 12.5 MG/5ML elixir  Take  by mouth 4 (Four) Times a Day As Needed for Itching.             docusate sodium (COLACE) 100 MG capsule  Take 1 capsule  by mouth Daily As Needed for Constipation.             Prenatal Multivit-Min-Fe-FA (PRENATAL VITAMINS PO)  Take  by mouth.             QVAR 40 MCG/ACT inhaler  Inhale 40 mcg 2 (Two) Times a Day.                 INSTRUCTIONS:  Activity: as tolerated  Diet: as tolerated  Special instructions: Precautions and instructions were discussed with her including but not limited to maintaining a regular diet at home, practicing local hygiene, pelvic rest and signs and symptoms to report including heavy vaginal bleeding, frequent passage of clots, foul odor of lochia, increased pain, fever or any other concerns.    FOLLOW UP:  Celso Aguirre MD in 2 weeks       Celso Aguirre MD is the attending at time of discharge, he is aware of the patient's status and agrees with the above discharge summary.    Signature  Natty Oquendo MD PGY1  [place] [address]  Office: [phone]        This document has been electronically signed by Natty Oquendo MD on September 20, 2017 5:55 AM

## 2017-09-20 NOTE — PROGRESS NOTES
POSTPARTUM PROGRESS NOTE  NAME: Ligia Hay  : 1985  MRN: [id number]      LOS: 2 days     Chief Complaint: no complaints     Subjective:     Interval History:  Pain well controlled with medications, (+) Flatus, - BM, lochia minimal, (+) voiding, (+) ambulation.  Desires OCPs for birth control. Breastfeeding.    Complains of being light-headed and having headaches. This symptoms started one month ago, and she was referred to Cardiologist. Work-up was normal. She has follow-up appointment in November.        Objective:     Vital Signs  Temp:  [97.6 °F (36.4 °C)-98.6 °F (37 °C)] 97.6 °F (36.4 °C)  Heart Rate:  [] 80  Resp:  [18] 18  BP: (130-138)/(72-87) 130/72    Physical Exam  GEN: A&O x3, NAD  CVS: RRR, S1/S2, no m/g/r  LUNGS: CTAB, no wheezes, no rhonchi  ABD: Soft, Nontender  Fundus: firm below umbilicus   EXT: no edema, no calf tenderness bilaterally.    Medication Review    Current Facility-Administered Medications:   •  acetaminophen (TYLENOL) tablet 650 mg, 650 mg, Oral, Q4H PRN, CATHERINE Meehan  •  albuterol (PROVENTIL) nebulizer solution 0.083% 2.5 mg/3mL, 2.5 mg, Nebulization, Q6H PRN, Levy Worrell, APRN  •  benzocaine-lanolin-aloe vera (DERMOPLAST) 20-0.5 % topical spray, , Topical, PRN, Celso Aguirre MD  •  butorphanol (STADOL) injection 1 mg, 1 mg, Intravenous, Q2H PRN, Celso Aguirre MD, 1 mg at 17  •  ibuprofen (ADVIL,MOTRIN) tablet 600 mg, 600 mg, Oral, Q6H, Celso Aguirre MD, 600 mg at 17  •  lactated ringers infusion, 125 mL/hr, Intravenous, Continuous, CATHERINE Meehan, Last Rate: 125 mL/hr at 17, 125 mL/hr at 17  •  lanolin ointment, , Topical, PRN, Celso Aguirre MD  •  lidocaine (XYLOCAINE) 1 % injection 5 mL, 5 mL, Intradermal, PRN, CATHERINE Meehan  •  Oxytocin-Lactated Ringers (PITOCIN) 20 UNIT/L in lactated Ringer's 1000 mL IVPB, 999 mL/hr, Intravenous, Continuous PRN, Celso Aguirre,  MD, Last Rate: 999 mL/hr at 17 0114, 999 mL/hr at 17 0114  •  Oxytocin-Sodium Chloride (PITOCIN) 30-0.9 UT/500ML-% infusion solution, 2-16 eron-units/min, Intravenous, Titrated, Levy Worrell APRN  •  sodium chloride 0.9 % flush 1-10 mL, 1-10 mL, Intravenous, PRN, Levy Worrell APRN  •  Tdap (BOOSTRIX) injection 0.5 mL, 0.5 mL, Intramuscular, During Hospitalization, Celso Aguirre MD     Diagnostic Data    Lab Results (last 24 hours)     Procedure Component Value Units Date/Time    Tissue Pathology Exam [[id number]] Collected:  17    Specimen:  Tissue from Placenta Updated:  17          I reviewed the patient's new clinical results.    Assessment/Plan:     Ligia Hay 32 y.o.  s/p spontaneous vaginal delivery PPD #2   1. Encourage ambulation  2. Continue routine postpartum care.    Plan for disposition: Discharge home               This document has been electronically signed by Natty Oquendo MD on 2017 5:32 AM

## 2017-09-21 PROBLEM — O99.343 DEPRESSION AFFECTING PREGNANCY IN THIRD TRIMESTER, ANTEPARTUM: Status: RESOLVED | Noted: 2017-02-07 | Resolved: 2017-09-21

## 2017-09-21 PROBLEM — F32.A DEPRESSION AFFECTING PREGNANCY IN THIRD TRIMESTER, ANTEPARTUM: Status: RESOLVED | Noted: 2017-02-07 | Resolved: 2017-09-21

## 2017-09-25 LAB
LAB AP CASE REPORT: NORMAL
Lab: NORMAL
PATH REPORT.FINAL DX SPEC: NORMAL
PATH REPORT.GROSS SPEC: NORMAL

## 2017-10-05 ENCOUNTER — OFFICE VISIT (OUTPATIENT)
Dept: OBSTETRICS AND GYNECOLOGY | Facility: CLINIC | Age: 32
End: 2017-10-05

## 2017-10-05 VITALS
BODY MASS INDEX: 23.63 KG/M2 | WEIGHT: 147 LBS | HEIGHT: 66 IN | SYSTOLIC BLOOD PRESSURE: 120 MMHG | DIASTOLIC BLOOD PRESSURE: 91 MMHG

## 2017-10-05 DIAGNOSIS — N89.8 VAGINAL ITCHING: ICD-10-CM

## 2017-10-05 PROBLEM — R87.612 LGSIL ON PAP SMEAR OF CERVIX: Status: RESOLVED | Noted: 2017-03-06 | Resolved: 2017-10-05

## 2017-10-05 LAB
BACTERIA UR QL AUTO: ABNORMAL /HPF
BILIRUB UR QL STRIP: NEGATIVE
CANDIDA ALBICANS: NEGATIVE
CLARITY UR: CLEAR
COLOR UR: YELLOW
GARDNERELLA VAGINALIS: NEGATIVE
GLUCOSE UR STRIP-MCNC: NEGATIVE MG/DL
HGB UR QL STRIP.AUTO: ABNORMAL
HYALINE CASTS UR QL AUTO: ABNORMAL /LPF
KETONES UR QL STRIP: ABNORMAL
LEUKOCYTE ESTERASE UR QL STRIP.AUTO: ABNORMAL
NITRITE UR QL STRIP: NEGATIVE
PH UR STRIP.AUTO: 6.5 [PH] (ref 5–9)
PROT UR QL STRIP: NEGATIVE
RBC # UR: ABNORMAL /HPF
REF LAB TEST METHOD: ABNORMAL
SP GR UR STRIP: 1.02 (ref 1–1.03)
SQUAMOUS #/AREA URNS HPF: ABNORMAL /HPF
TRICHOMONAS VAGINALIS PCR: NEGATIVE
UROBILINOGEN UR QL STRIP: ABNORMAL
WBC UR QL AUTO: ABNORMAL /HPF

## 2017-10-05 PROCEDURE — 87480 CANDIDA DNA DIR PROBE: CPT | Performed by: ADVANCED PRACTICE MIDWIFE

## 2017-10-05 PROCEDURE — 87086 URINE CULTURE/COLONY COUNT: CPT | Performed by: ADVANCED PRACTICE MIDWIFE

## 2017-10-05 PROCEDURE — 87510 GARDNER VAG DNA DIR PROBE: CPT | Performed by: ADVANCED PRACTICE MIDWIFE

## 2017-10-05 PROCEDURE — 81001 URINALYSIS AUTO W/SCOPE: CPT | Performed by: ADVANCED PRACTICE MIDWIFE

## 2017-10-05 PROCEDURE — 87660 TRICHOMONAS VAGIN DIR PROBE: CPT | Performed by: ADVANCED PRACTICE MIDWIFE

## 2017-10-05 PROCEDURE — 0503F POSTPARTUM CARE VISIT: CPT | Performed by: ADVANCED PRACTICE MIDWIFE

## 2017-10-05 NOTE — PROGRESS NOTES
"Subjective   Ligia Hay is a 32 y.o. female who presents for a postpartum visit. She is 2 weeks postpartum following a spontaneous vaginal delivery. I have fully reviewed the prenatal and intrapartum course. The delivery was at term gestational weeks. Outcome: spontaneous vaginal delivery. Anesthesia: none. Postpartum course has been remarkable for hemorrhoid. Baby's course has been unremarkable . Baby is feeding by breast and formula. Bleeding thin lochia. Bowel function is normal. Bladder function is normal. Patient is not sexually active. Contraception method is none. Postpartum depression screening: positive. EDPS=10 declined medications Denies suicidal thoughts.     The following portions of the patient's history were reviewed and updated as appropriate: allergies, current medications, past family history, past medical history, past social history, past surgical history and problem list.    Review of Systems  Genitourinary:positive for hemorrhoids, all other systems negative     Objective   /91  Ht 66\" (167.6 cm)  Wt 147 lb (66.7 kg)  LMP Comment: 2wks pp   Breastfeeding? Yes Comment: breast and formula feeding.   BMI 23.73 kg/m2   General:  alert, appears stated age and cooperative    Breasts:  inspection negative, no nipple discharge or bleeding, no masses or nodularity palpable   Lungs: clear to auscultation bilaterally   Heart:  regular rate and rhythm, S1, S2 normal, no murmur, click, rub or gallop   Abdomen: soft, non-tender; bowel sounds normal; no masses,  no organomegaly   skin Warm to touch, no rash   muskuloskeletal Normal gait, DTR+2   HEENT ANA    vulva No blisters, pink discharge, laceration healing             Assessment/Plan   2 weeks postpartum exam. Pap smear not done at today's visit. 6/29/17 negative. Needs yearly paps     1. Contraception: OCP (estrogen/progesterone), seasonique   2.OCPs  3. Follow up in: 1 weeks or as needed. Consulted Dr. Rojas and recommended f/u " in 1 week for BP check, tylenol or excedrine OTC for HA  4. Educated on preeclampsia s/s and when to go to the hospital, verbalized understanding       Ligia was seen today for postpartum care.    Diagnoses and all orders for this visit:    Routine postpartum follow-up    Vaginal itching  -     Gardnerella vaginalis, Trichomonas vaginalis, Candida albicans, PCR - Swab, Vagina  -     Urine Culture - Urine, Urine, Clean Catch  -     Urinalysis With / Microscopic If Indicated - Urine, Clean Catch

## 2017-10-06 LAB
BACTERIA SPEC AEROBE CULT: NORMAL
BACTERIA SPEC AEROBE CULT: NORMAL

## 2017-10-12 ENCOUNTER — APPOINTMENT (OUTPATIENT)
Dept: LAB | Facility: HOSPITAL | Age: 32
End: 2017-10-12

## 2017-10-12 ENCOUNTER — OFFICE VISIT (OUTPATIENT)
Dept: OBSTETRICS AND GYNECOLOGY | Facility: CLINIC | Age: 32
End: 2017-10-12

## 2017-10-12 VITALS — DIASTOLIC BLOOD PRESSURE: 97 MMHG | HEIGHT: 66 IN | HEART RATE: 79 BPM | SYSTOLIC BLOOD PRESSURE: 130 MMHG

## 2017-10-12 DIAGNOSIS — Z91.89 AT RISK FOR POSTPARTUM DEPRESSION: ICD-10-CM

## 2017-10-12 LAB
ALBUMIN SERPL-MCNC: 4 G/DL (ref 3.4–4.8)
ALBUMIN/GLOB SERPL: 1.3 G/DL (ref 1.1–1.8)
ALP SERPL-CCNC: 81 U/L (ref 38–126)
ALT SERPL W P-5'-P-CCNC: 36 U/L (ref 9–52)
ANION GAP SERPL CALCULATED.3IONS-SCNC: 11 MMOL/L (ref 5–15)
AST SERPL-CCNC: 24 U/L (ref 14–36)
BILIRUB SERPL-MCNC: 0.5 MG/DL (ref 0.2–1.3)
BUN BLD-MCNC: 6 MG/DL (ref 7–21)
BUN/CREAT SERPL: 7.7 (ref 7–25)
CALCIUM SPEC-SCNC: 9.2 MG/DL (ref 8.4–10.2)
CHLORIDE SERPL-SCNC: 103 MMOL/L (ref 95–110)
CO2 SERPL-SCNC: 26 MMOL/L (ref 22–31)
CREAT BLD-MCNC: 0.78 MG/DL (ref 0.5–1)
DEPRECATED RDW RBC AUTO: 45.9 FL (ref 36.4–46.3)
ERYTHROCYTE [DISTWIDTH] IN BLOOD BY AUTOMATED COUNT: 13.2 % (ref 11.5–14.5)
GFR SERPL CREATININE-BSD FRML MDRD: 104 ML/MIN/1.73 (ref 64–149)
GLOBULIN UR ELPH-MCNC: 3.2 GM/DL (ref 2.3–3.5)
GLUCOSE BLD-MCNC: 70 MG/DL (ref 60–100)
HCT VFR BLD AUTO: 38.6 % (ref 35–45)
HGB BLD-MCNC: 12.9 G/DL (ref 12–15.5)
MCH RBC QN AUTO: 31.4 PG (ref 26.5–34)
MCHC RBC AUTO-ENTMCNC: 33.4 G/DL (ref 31.4–36)
MCV RBC AUTO: 93.9 FL (ref 80–98)
PLATELET # BLD AUTO: 358 10*3/MM3 (ref 150–450)
PMV BLD AUTO: 9.8 FL (ref 8–12)
POTASSIUM BLD-SCNC: 3.7 MMOL/L (ref 3.5–5.1)
PROT SERPL-MCNC: 7.2 G/DL (ref 6.3–8.6)
RBC # BLD AUTO: 4.11 10*6/MM3 (ref 3.77–5.16)
SODIUM BLD-SCNC: 140 MMOL/L (ref 137–145)
WBC NRBC COR # BLD: 7.32 10*3/MM3 (ref 3.2–9.8)

## 2017-10-12 PROCEDURE — 85027 COMPLETE CBC AUTOMATED: CPT | Performed by: NURSE PRACTITIONER

## 2017-10-12 PROCEDURE — 36415 COLL VENOUS BLD VENIPUNCTURE: CPT | Performed by: NURSE PRACTITIONER

## 2017-10-12 PROCEDURE — 80053 COMPREHEN METABOLIC PANEL: CPT | Performed by: NURSE PRACTITIONER

## 2017-10-12 PROCEDURE — 0503F POSTPARTUM CARE VISIT: CPT | Performed by: NURSE PRACTITIONER

## 2017-10-12 RX ORDER — LABETALOL 100 MG/1
100 TABLET, FILM COATED ORAL DAILY
Qty: 30 TABLET | Refills: 0 | Status: SHIPPED | OUTPATIENT
Start: 2017-10-12 | End: 2017-10-30 | Stop reason: SDUPTHER

## 2017-10-12 NOTE — PROGRESS NOTES
Subjective   Chief Complaint   Patient presents with   • Postpartum Care     B/P check     Ligia Hay is a 32 y.o. year old  presenting to be seen for her postpartum visit.  She had a vaginal delivery.   Prenatal course was been benign.    Since delivery she has not been sexually active.  She does have concerns about post-partum blues/depression. Feels overwhelmed and finds herself crying more than usual. States that she constantly feels like she doesn't know what she's doing and is frequently second guessing herself. Reports that she enjoys holding, feeding and spending time with her son but has felt overly tired and worried that she's not doing a good job as a mother. She was reassured that her feelings are valid and normal, especially for a first-time mother. Her mother is present and feels like she is doing everything right but is just nervous and overwhelmed in this new role.   She is both breast and bottle feeding due to decreased milk supply.    The following portions of the patient's history were reviewed and updated as appropriate:problem list, current medications, allergies, past family history, past medical history, past social history and past surgical history    Smoking status: Current Every Day Smoker                                                   Packs/day: 0.00      Years: 0.00         Types: Cigarettes     Last attempt to quit: 2017  Smokeless status: Never Used                      Comment: smokes less than a half a pack a day     Review of Systems   Constitutional: Positive for appetite change and fatigue. Negative for activity change, diaphoresis and unexpected weight change.        Decreased appetite in the past few days.   Eyes: Positive for visual disturbance.        Blurred vision with headaches     Respiratory: Negative for chest tightness and shortness of breath.    Cardiovascular: Negative for chest pain, palpitations and leg swelling.   Endocrine: Negative.   "  Genitourinary: Positive for vaginal bleeding. Negative for difficulty urinating, dysuria, frequency, pelvic pain, urgency, vaginal discharge and vaginal pain.        Lochia light (alba)   Skin: Negative for color change and pallor.   Neurological: Positive for headaches. Negative for dizziness, facial asymmetry, speech difficulty, weakness, light-headedness and numbness.   Hematological: Negative for adenopathy.   Psychiatric/Behavioral: Negative for agitation, decreased concentration, dysphoric mood, self-injury and sleep disturbance. The patient is nervous/anxious.          Objective   /97  Pulse 79  Ht 66\" (167.6 cm)    General:  well developed; well nourished  no acute distress   Abdomen: soft, non-tender; no masses  no umbilical or inginual hernias are present  no hepato-splenomegaly  fundus firm and non-tender  Normal findings: bowel sounds normal   Pelvis: Not performed.            Diagnoses and all orders for this visit:    Postpartum hypertension  -     CBC (No Diff)  -     Comprehensive Metabolic Panel  -     Protein, Urine, 24 Hour - Urine, Clean Catch; Future    Encounter for  visit    Encounter for care and examination of lactating mother    At risk for postpartum depression    Other orders  -     labetalol (NORMODYNE) 100 MG tablet; Take 1 tablet by mouth Daily.        New Medications Ordered This Visit   Medications   • labetalol (NORMODYNE) 100 MG tablet     Sig: Take 1 tablet by mouth Daily.     Dispense:  30 tablet     Refill:  0     Will start on labetalol once per day since she is symptomatic. Labs ordered and Dr. Rojas consulted. Potential s/e and precautions given. F/U on Monday for BP recheck.    This note was electronically signed.    CATHERINE Fair  2017  "

## 2017-10-13 PROBLEM — Z91.89 AT RISK FOR POSTPARTUM DEPRESSION: Status: ACTIVE | Noted: 2017-10-13

## 2017-10-16 ENCOUNTER — LAB (OUTPATIENT)
Dept: LAB | Facility: HOSPITAL | Age: 32
End: 2017-10-16

## 2017-10-16 LAB
COLLECT DURATION TIME UR: 24 HRS
PROT 24H UR-MRATE: 211.5 MG/24HOURS (ref 0–230)
PROT UR-MCNC: 14.1 MG/DL
SPECIMEN VOL 24H UR: 1500 ML

## 2017-10-16 PROCEDURE — 81050 URINALYSIS VOLUME MEASURE: CPT | Performed by: NURSE PRACTITIONER

## 2017-10-16 PROCEDURE — 84156 ASSAY OF PROTEIN URINE: CPT | Performed by: NURSE PRACTITIONER

## 2017-10-17 ENCOUNTER — OFFICE VISIT (OUTPATIENT)
Dept: OBSTETRICS AND GYNECOLOGY | Facility: CLINIC | Age: 32
End: 2017-10-17

## 2017-10-17 VITALS
WEIGHT: 147 LBS | BODY MASS INDEX: 23.73 KG/M2 | HEART RATE: 64 BPM | SYSTOLIC BLOOD PRESSURE: 136 MMHG | DIASTOLIC BLOOD PRESSURE: 90 MMHG

## 2017-10-17 DIAGNOSIS — Z91.89 AT RISK FOR POSTPARTUM DEPRESSION: ICD-10-CM

## 2017-10-17 PROCEDURE — 0503F POSTPARTUM CARE VISIT: CPT | Performed by: ADVANCED PRACTICE MIDWIFE

## 2017-10-17 RX ORDER — NIFEDIPINE 60 MG/1
60 TABLET, EXTENDED RELEASE ORAL DAILY
Qty: 30 TABLET | Refills: 10 | Status: SHIPPED | OUTPATIENT
Start: 2017-10-17 | End: 2017-10-26 | Stop reason: HOSPADM

## 2017-10-17 NOTE — PROGRESS NOTES
Subjective   Ligia Hay is a 32 y.o. female who presents for a postpartum visit. She is 4 weeks postpartum following a spontaneous vaginal delivery. I have fully reviewed the prenatal and intrapartum course.   Outcome: spontaneous vaginal delivery. Anesthesia: none. Postpartum course has been remarkable for elevated BPs. Baby's course has been unremarkable. Baby is feeding by breast and formula. Bleeding thin lochia. Bowel function is normal. Bladder function is normal. Patient is not sexually active. Contraception method is none. Postpartum depression screening: negative. Reports occasional headaches and does not like to take medication for it.     The following portions of the patient's history were reviewed and updated as appropriate: allergies, current medications, past family history, past medical history, past social history, past surgical history and problem list.    Review of Systems  A comprehensive review of systems was negative.    Objective   /90  Pulse 64  Wt 147 lb (66.7 kg)  BMI 23.73 kg/m2   General:  alert, appears stated age and cooperative   skin:  Warm to touch, no rash   Lungs: clear to auscultation bilaterally   Heart:  regular rate and rhythm, S1, S2 normal, no murmur, click, rub or gallop   Abdomen: soft, non-tender; bowel sounds normal; no masses,  no organomegaly   HEENT ANA, no nasal drainage    Muskuloskeletal Normal gait, DTR+2                     Assessment/Plan   4 weeks postpartum exam. Pap smear not done at today's visit.    1. Contraception: none  2. Consulted Dr. Rojas for /90 and he recommended to add procardia 60 mg XL and continue with labetalol daily. Educated patient on how to take medication. Reviewed s/s of preeclampsia to report and go to the ER for.  Encouraged to take tylenol for HA.   3. Follow up in: 1 week for BP check  4. Reviewed measures to destress    Ligia was seen today for follow-up.    Diagnoses and all orders for this  visit:    Postpartum hypertension    At risk for postpartum depression    Other orders  -     NIFEdipine XL (PROCARDIA XL) 60 MG 24 hr tablet; Take 1 tablet by mouth Daily.

## 2017-10-26 ENCOUNTER — OFFICE VISIT (OUTPATIENT)
Dept: OBSTETRICS AND GYNECOLOGY | Facility: CLINIC | Age: 32
End: 2017-10-26

## 2017-10-26 VITALS
HEART RATE: 84 BPM | DIASTOLIC BLOOD PRESSURE: 74 MMHG | BODY MASS INDEX: 23.3 KG/M2 | WEIGHT: 145 LBS | HEIGHT: 66 IN | SYSTOLIC BLOOD PRESSURE: 115 MMHG

## 2017-10-26 DIAGNOSIS — Z30.011 ENCOUNTER FOR INITIAL PRESCRIPTION OF CONTRACEPTIVE PILLS: ICD-10-CM

## 2017-10-26 PROCEDURE — 0503F POSTPARTUM CARE VISIT: CPT | Performed by: ADVANCED PRACTICE MIDWIFE

## 2017-10-26 RX ORDER — ACETAMINOPHEN AND CODEINE PHOSPHATE 120; 12 MG/5ML; MG/5ML
1 SOLUTION ORAL DAILY
Qty: 28 TABLET | Refills: 12 | Status: SHIPPED | OUTPATIENT
Start: 2017-10-26 | End: 2018-10-03

## 2017-10-26 NOTE — PROGRESS NOTES
"Subjective   Ligia Hay is a 32 y.o. female who presents for a postpartum visit. She is 5 weeks postpartum following a spontaneous vaginal delivery. I have fully reviewed the prenatal and intrapartum course. The delivery was at term gestational weeks. Outcome: spontaneous vaginal delivery. Anesthesia: none. Postpartum course has been remarkable for elevated BP. Baby's course has been unremarkable. Baby is feeding by formula and breast. Bleeding no bleeding. Bowel function is normal. Bladder function is normal. Patient is not sexually active. Contraception method is none. Postpartum depression screening: negative.    The following portions of the patient's history were reviewed and updated as appropriate: allergies, current medications, past family history, past medical history, past social history, past surgical history and problem list.    Review of Systems  A comprehensive review of systems was negative.    Objective   /74  Pulse 84  Ht 66\" (167.6 cm)  Wt 145 lb (65.8 kg)  LMP Comment: 5wks pp   Breastfeeding? Yes  BMI 23.4 kg/m2   General:  alert, appears stated age and cooperative    Breasts:  inspection negative, no nipple discharge or bleeding, no masses or nodularity palpable   Lungs: clear to auscultation bilaterally   Heart:  regular rate and rhythm, S1, S2 normal, no murmur, click, rub or gallop   Abdomen: soft, non-tender; bowel sounds normal; no masses,  no organomegaly   skin Warm to touch, no rash   muskuloskeletal Normal gait, DTR +2   HEENT ANA, no thyromegaly                 Assessment/Plan   5 weeks postpartum exam. Pap smear not done at today's visit. Needs PAP next year.    1. Contraception: oral progesterone-only contraceptive.   2. Educated on continuing PNV, used of condoms for the 1st week, to take OCP same time every day, reviewed ACHES S/S to report while on the pill  3. Follow up in: 1 year or as needed.      Ligia was seen today for postpartum care. Normotensive " with labetalol     Diagnoses and all orders for this visit:    Routine postpartum follow-up    Encounter for initial prescription of contraceptive pills    Other orders  -     norethindrone (MICRONOR) 0.35 MG tablet; Take 1 tablet by mouth Daily.

## 2017-10-27 ENCOUNTER — OFFICE VISIT (OUTPATIENT)
Dept: FAMILY MEDICINE CLINIC | Facility: CLINIC | Age: 32
End: 2017-10-27

## 2017-10-27 VITALS
HEIGHT: 66 IN | WEIGHT: 145.9 LBS | DIASTOLIC BLOOD PRESSURE: 94 MMHG | SYSTOLIC BLOOD PRESSURE: 144 MMHG | OXYGEN SATURATION: 97 % | HEART RATE: 66 BPM | BODY MASS INDEX: 23.45 KG/M2

## 2017-10-27 DIAGNOSIS — M79.7 PRIMARY FIBROMYALGIA: Primary | ICD-10-CM

## 2017-10-27 PROCEDURE — 99213 OFFICE O/P EST LOW 20 MIN: CPT | Performed by: FAMILY MEDICINE

## 2017-10-27 NOTE — PATIENT INSTRUCTIONS
"Low-Sodium Eating Plan  Sodium raises blood pressure and causes water to be held in the body. Getting less sodium from food will help lower your blood pressure, reduce any swelling, and protect your heart, liver, and kidneys. We get sodium by adding salt (sodium chloride) to food. Most of our sodium comes from canned, boxed, and frozen foods. Restaurant foods, fast foods, and pizza are also very high in sodium. Even if you take medicine to lower your blood pressure or to reduce fluid in your body, getting less sodium from your food is important.  WHAT IS MY PLAN?  Most people should limit their sodium intake to 2,300 mg a day. Your health care provider recommends that you limit your sodium intake to __________ a day.   WHAT DO I NEED TO KNOW ABOUT THIS EATING PLAN?  For the low-sodium eating plan, you will follow these general guidelines:  · Choose foods with a % Daily Value for sodium of less than 5% (as listed on the food label).    · Use salt-free seasonings or herbs instead of table salt or sea salt.    · Check with your health care provider or pharmacist before using salt substitutes.    · Eat fresh foods.  · Eat more vegetables and fruits.  · Limit canned vegetables. If you do use them, rinse them well to decrease the sodium.    · Limit cheese to 1 oz (28 g) per day.     · Eat lower-sodium products, often labeled as \"lower sodium\" or \"no salt added.\"  · Avoid foods that contain monosodium glutamate (MSG). MSG is sometimes added to Chinese food and some canned foods.    · Check food labels (Nutrition Facts labels) on foods to learn how much sodium is in one serving.  · Eat more home-cooked food and less restaurant, buffet, and fast food.   · When eating at a restaurant, ask that your food be prepared with less salt, or no salt if possible.    HOW DO I READ FOOD LABELS FOR SODIUM INFORMATION?  The Nutrition Facts label lists the amount of sodium in one serving of the food. If you eat more than one serving, you " must multiply the listed amount of sodium by the number of servings.  Food labels may also identify foods as:  · Sodium free--Less than 5 mg in a serving.  · Very low sodium--35 mg or less in a serving.  · Low sodium--140 mg or less in a serving.  · Light in sodium--50% less sodium in a serving. For example, if a food that usually has 300 mg of sodium is changed to become light in sodium, it will have 150 mg of sodium.  · Reduced sodium--25% less sodium in a serving. For example, if a food that usually has 400 mg of sodium is changed to reduced sodium, it will have 300 mg of sodium.  WHAT FOODS CAN I EAT?  Grains   Low-sodium cereals, including oats, puffed wheat and rice, and shredded wheat cereals. Low-sodium crackers. Unsalted rice and pasta. Lower-sodium bread.   Vegetables   Frozen or fresh vegetables. Low-sodium or reduced-sodium canned vegetables. Low-sodium or reduced-sodium tomato sauce and paste. Low-sodium or reduced-sodium tomato and vegetable juices.   Fruits   Fresh, frozen, and canned fruit. Fruit juice.   Meat and Other Protein Products   Low-sodium canned tuna and salmon. Fresh or frozen meat, poultry, seafood, and fish. Lamb. Unsalted nuts. Dried beans, peas, and lentils without added salt. Unsalted canned beans. Homemade soups without salt. Eggs.   Dairy   Milk. Soy milk. Ricotta cheese. Low-sodium or reduced-sodium cheeses. Yogurt.   Condiments   Fresh and dried herbs and spices. Salt-free seasonings. Onion and garlic powders. Low-sodium varieties of mustard and ketchup. Fresh or refrigerated horseradish. Lemon juice.   Fats and Oils    Reduced-sodium salad dressings. Unsalted butter.    Other   Unsalted popcorn and pretzels.   The items listed above may not be a complete list of recommended foods or beverages. Contact your dietitian for more options.  WHAT FOODS ARE NOT RECOMMENDED?  Grains   Instant hot cereals. Bread stuffing, pancake, and biscuit mixes. Croutons. Seasoned rice or pasta mixes.  Noodle soup cups. Boxed or frozen macaroni and cheese. Self-rising flour. Regular salted crackers.  Vegetables   Regular canned vegetables. Regular canned tomato sauce and paste. Regular tomato and vegetable juices. Frozen vegetables in sauces. Salted French fries. Olives. Pickles. Relishes. Sauerkraut. Salsa.  Meat and Other Protein Products   Salted, canned, smoked, spiced, or pickled meats, seafood, or fish. [place], ham, sausage, hot dogs, corned beef, chipped beef, and packaged luncheon meats. Salt pork. Jerky. Pickled herring. Anchovies, regular canned tuna, and sardines. Salted nuts.  Dairy   Processed cheese and cheese spreads. Cheese curds. Blue cheese and cottage cheese. Buttermilk.   Condiments   Onion and garlic salt, seasoned salt, table salt, and sea salt. Canned and packaged gravies. Worcestershire sauce. Tartar sauce. Barbecue sauce. Teriyaki sauce. Soy sauce, including reduced sodium. Steak sauce. Fish sauce. Oyster sauce. Cocktail sauce. Horseradish that you find on the shelf. Regular ketchup and mustard. Meat flavorings and tenderizers. Bouillon cubes. Hot sauce. Tabasco sauce. Marinades. Taco seasonings. Relishes.  Fats and Oils    Regular salad dressings. Salted butter. Margarine. Ghee. [place] fat.   Other   Potato and tortilla chips. Corn chips and puffs. Salted popcorn and pretzels. Canned or dried soups. Pizza. Frozen entrees and pot pies.    The items listed above may not be a complete list of foods and beverages to avoid. Contact your dietitian for more information.     This information is not intended to replace advice given to you by your health care provider. Make sure you discuss any questions you have with your health care provider.     Document Released: 06/09/2003 Document Revised: 01/08/2016 Document Reviewed: 10/22/2014  [employer] Patient Education ©2017 [employer]

## 2017-10-27 NOTE — PROGRESS NOTES
Subjective   Ligia Hay is a 32 y.o. female.     Fibromyalgia   This is a chronic problem. The current episode started more than 1 year ago. The problem has been unchanged.   Anxiety   Presents for follow-up visit. Symptoms include insomnia, irritability, malaise and shortness of breath. Patient reports no depressed mood, excessive worry, hyperventilation, nervous/anxious behavior, palpitations or panic. Symptoms occur constantly.            The following portions of the patient's history were reviewed and updated as appropriate: allergies, current medications, past family history, past medical history, past social history, past surgical history and problem list.    Review of Systems   Constitutional: Positive for irritability.   Respiratory: Positive for shortness of breath.    Cardiovascular: Negative for palpitations.   Genitourinary: Positive for frequency. Menstrual problem: late on period.   Psychiatric/Behavioral: The patient has insomnia. The patient is not nervous/anxious.        Objective   Physical Exam   Constitutional: She is oriented to person, place, and time. She appears well-developed and well-nourished. No distress.   HENT:   Head: Normocephalic and atraumatic.   Right Ear: Hearing normal. Tympanic membrane is retracted. Tympanic membrane is not injected, not scarred, not perforated and not erythematous.   Left Ear: Hearing and tympanic membrane normal.   Nose: Mucosal edema and rhinorrhea present.   Cardiovascular: Normal rate, regular rhythm and normal heart sounds.    Pulmonary/Chest: No respiratory distress. She has no wheezes. She has no rales. She exhibits no tenderness.   Neurological: She is alert and oriented to person, place, and time.   Skin: Skin is warm and dry. She is not diaphoretic.   Psychiatric: She has a normal mood and affect. Her speech is normal and behavior is normal. Judgment and thought content normal. Her mood appears not anxious. Her affect is not angry, not  blunt, not labile and not inappropriate. Cognition and memory are normal. She does not exhibit a depressed mood.   Nursing note and vitals reviewed.      Assessment/Plan   Problems Addressed this Visit        Musculoskeletal and Integument    Primary fibromyalgia - Primary

## 2017-10-30 RX ORDER — LABETALOL 100 MG/1
100 TABLET, FILM COATED ORAL DAILY
Qty: 30 TABLET | Refills: 5 | Status: SHIPPED | OUTPATIENT
Start: 2017-10-30 | End: 2018-05-14 | Stop reason: SDUPTHER

## 2017-11-01 ENCOUNTER — OFFICE VISIT (OUTPATIENT)
Dept: CARDIOLOGY | Facility: CLINIC | Age: 32
End: 2017-11-01

## 2017-11-01 VITALS
HEART RATE: 72 BPM | DIASTOLIC BLOOD PRESSURE: 82 MMHG | HEIGHT: 66 IN | WEIGHT: 144 LBS | SYSTOLIC BLOOD PRESSURE: 130 MMHG | BODY MASS INDEX: 23.14 KG/M2

## 2017-11-01 DIAGNOSIS — Z72.0 NICOTINE ABUSE: ICD-10-CM

## 2017-11-01 PROCEDURE — 99212 OFFICE O/P EST SF 10 MIN: CPT | Performed by: INTERNAL MEDICINE

## 2017-11-01 NOTE — PROGRESS NOTES
[facility]  OFFICE NOTE    Ligia Hay  32 y.o. female      1.    Essential hypertension  2.    Palpitation    Chief complaint -Palpitation      History of present Illness- 32-year-old lady, who had seen with palpitations had delivered 6 weeks ago and healthy baby boy.  She had an echo which was unremarkable.  There is strong family history of hypertension both her parents a day and her age and she has history of hypertension now and on labetalol.  She still smoking and talked to her about quitting smoking.           Allergies   Allergen Reactions   • Adhesive Tape    • Fish-Derived Products          Past Medical History:   Diagnosis Date   • Abnormal Pap smear of cervix     REPEAT PAP SMEAR NORMAL    • Acute otitis externa 10/14/2015   • Acute sinusitis 2015   • Allergic rhinitis 2015   • Anxiety 2016   • Asthma    • Bleeding hemorrhoids 2015   • Depression    • Endometriosis    • Headache 10/20/2015   • Impacted cerumen 10/14/2015   • Irregular menstruation 2015   • Irregular periods 2016   • Knee pain 10/06/2015   • Loss of hair 2016   • Nonvenomous insect bite 2016   • Pain in eye 10/20/2015   • Palpitations    • Paresthesia 2015   • Pregnancy     A0   • Primary fibromyalgia 2016   • Shoulder joint pain 10/20/2015   • Tremor 2015         Past Surgical History:   Procedure Laterality Date   • DIAGNOSTIC LAPAROSCOPY      DR. AGUERO    • ENDOSCOPY  2016    Normal   • MOUTH SURGERY      Bath teeth extraction   • WISDOM TOOTH EXTRACTION           Family History   Problem Relation Age of Onset   • Hypertension Mother    • Hypertension Father    • Migraines Father    • Hypertension Other    • Asthma Brother    • Migraines Brother    • Heart disease Sister    • Hypertension Paternal Grandmother    • Heart attack Maternal Grandmother    • Hypertension Maternal Grandmother    • Cancer Maternal  Grandfather          Social History     Social History   • Marital status: Single     Spouse name: N/A   • Number of children: N/A   • Years of education: N/A     Occupational History   • Not on file.     Social History Main Topics   • Smoking status: Current Every Day Smoker     Types: Cigarettes     Last attempt to quit: 1/27/2017   • Smokeless tobacco: Never Used      Comment: smokes less than a half a pack a day    • Alcohol use No      Comment: Occassionally pre pregnancy   • Drug use: No   • Sexual activity: Not Currently     Partners: Male      Comment: wants OCP      Other Topics Concern   • Not on file     Social History Narrative         Current Outpatient Prescriptions   Medication Sig Dispense Refill   • albuterol (PROVENTIL HFA;VENTOLIN HFA) 108 (90 BASE) MCG/ACT inhaler Inhale 2 puffs Every 6 (Six) Hours As Needed for wheezing or shortness of air. 1 inhaler 5   • docusate sodium (COLACE) 100 MG capsule Take 1 capsule by mouth Daily As Needed for Constipation. 30 capsule 10   • ibuprofen (ADVIL,MOTRIN) 600 MG tablet Take 1 tablet by mouth Every 6 (Six) Hours. 30 tablet 0   • labetalol (NORMODYNE) 100 MG tablet Take 1 tablet by mouth Daily. 30 tablet 5   • norethindrone (MICRONOR) 0.35 MG tablet Take 1 tablet by mouth Daily. 28 tablet 12   • Prenatal Multivit-Min-Fe-FA (PRENATAL VITAMINS PO) Take  by mouth.     • QVAR 40 MCG/ACT inhaler Inhale 40 mcg 2 (Two) Times a Day.       No current facility-administered medications for this visit.          Review of Systems     Constitution: Denies any fatigue, fever or chills    HENT: Denies any headache, hearing impairment,     Eyes: Denies any blurring of vision, or photophobia     Cardivascular - As per history of present illness     Respiratory system- shortness of breath Due to asthma        Endocrine:  No history of hyperlipidemia, diabetes,                      Hypothyrodism       Musculoskeletal:  No history of arthritis with musculoskeletal  "problems    Gastrointestinal: No nausea, vomiting, or melena    Genitourinary: No dysuria or hematuria    Neurological:   No history of seizure disorder, stroke, memory problems    Psychiatric/Behavioral:         history of depression,  No history of bipolar disorder or schizophrenia     Hematological- no history of easy bruising or any bleeding diathesis            OBJECTIVE    /82  Pulse 72  Ht 66\" (167.6 cm)  Wt 144 lb (65.3 kg)  BMI 23.24 kg/m2      Physical Exam     Constitutional: is oriented to person, place, and time.     Skin-warm and dry    Well developed and nourished in no acute distress      Head: Normocephalic and atraumatic.     Eyes: Pupils are equal, round, and reactive to light.     Neck: Neck supple. No bruit in the carotids, no elevation of JVD    Cardiovascular: [place] in the fifth intercostal space   Regular rate, and  Rhythm,    S1 greater than S2, no S3 or S4, no gallop     Pulmonary/Chest:   Air  Entry is equal on both sides  No wheezing or crackles,      Abdominal: Distended due to 34 weeks of pregnancy    Musculoskeletal: No kyphoscoliosis, no significant thickening of the joints    Neurological: is alert and oriented to person, place, and time.    cranial nerve are intact .   No motor or sensory deficit    Extremities-no edema, no radial femoral delay      Psychiatric: He has a normal mood and affect.                  His behavior is normal.           Procedures      Lab Results   Component Value Date    WBC 7.32 10/12/2017    HGB 12.9 10/12/2017    HCT 38.6 10/12/2017    MCV 93.9 10/12/2017     10/12/2017     Lab Results   Component Value Date    GLUCOSE 70 10/12/2017    BUN 6 (L) 10/12/2017    CREATININE 0.78 10/12/2017    EGFRIFAFRI 104 10/12/2017    BCR 7.7 10/12/2017    CO2 26.0 10/12/2017    CALCIUM 9.2 10/12/2017    ALBUMIN 4.00 10/12/2017    LABIL2 1.3 10/12/2017    AST 24 10/12/2017    ALT 36 10/12/2017       Lab Results   Component Value Date    TSH 1.18 " 12/20/2016                  A/P    Palpitations resolved after her delivery of the baby, on labetalol for blood pressure and to help with her palpitations also, discussed about quitting nicotine which is a stimulant can cause palpitations and also using albuterol inhaler since she has COPD from her smoking.    Hypertension continue labetalol 100 mg twice a day and she'll follow with Dr. Aguilar.  Follow with me as needed            This document has been electronically signed by Marlon Keen MD on November 1, 2017 4:04 PM       EMR Dragon/Transcription disclaimer:   Some of this note may be an electronic transcription/translation of spoken language to printed text. The electronic translation of spoken language may permit erroneous, or at times, nonsensical words or phrases to be inadvertently transcribed; Although I have reviewed the note for such errors, some may still exist.

## 2018-01-03 ENCOUNTER — OFFICE VISIT (OUTPATIENT)
Dept: FAMILY MEDICINE CLINIC | Facility: CLINIC | Age: 33
End: 2018-01-03

## 2018-01-03 ENCOUNTER — LAB (OUTPATIENT)
Dept: LAB | Facility: HOSPITAL | Age: 33
End: 2018-01-03

## 2018-01-03 VITALS
SYSTOLIC BLOOD PRESSURE: 130 MMHG | HEART RATE: 75 BPM | DIASTOLIC BLOOD PRESSURE: 80 MMHG | WEIGHT: 132.2 LBS | BODY MASS INDEX: 21.24 KG/M2 | OXYGEN SATURATION: 99 % | HEIGHT: 66 IN

## 2018-01-03 DIAGNOSIS — R10.11 RIGHT UPPER QUADRANT ABDOMINAL PAIN: ICD-10-CM

## 2018-01-03 DIAGNOSIS — R10.11 RIGHT UPPER QUADRANT ABDOMINAL PAIN: Primary | ICD-10-CM

## 2018-01-03 LAB
ALBUMIN SERPL-MCNC: 4.3 G/DL (ref 3.4–4.8)
ALBUMIN/GLOB SERPL: 1.2 G/DL (ref 1.1–1.8)
ALP SERPL-CCNC: 73 U/L (ref 38–126)
ALT SERPL W P-5'-P-CCNC: 25 U/L (ref 9–52)
AMYLASE SERPL-CCNC: 92 U/L (ref 50–130)
ANION GAP SERPL CALCULATED.3IONS-SCNC: 13 MMOL/L (ref 5–15)
AST SERPL-CCNC: 40 U/L (ref 14–36)
BASOPHILS # BLD AUTO: 0.02 10*3/MM3 (ref 0–0.2)
BASOPHILS NFR BLD AUTO: 0.3 % (ref 0–2)
BILIRUB BLD-MCNC: ABNORMAL MG/DL
BILIRUB SERPL-MCNC: 0.3 MG/DL (ref 0.2–1.3)
BUN BLD-MCNC: 11 MG/DL (ref 7–21)
BUN/CREAT SERPL: 17.5 (ref 7–25)
CALCIUM SPEC-SCNC: 9.1 MG/DL (ref 8.4–10.2)
CHLORIDE SERPL-SCNC: 104 MMOL/L (ref 95–110)
CLARITY, POC: ABNORMAL
CO2 SERPL-SCNC: 26 MMOL/L (ref 22–31)
COLOR UR: ABNORMAL
CREAT BLD-MCNC: 0.63 MG/DL (ref 0.5–1)
DEPRECATED RDW RBC AUTO: 42.1 FL (ref 36.4–46.3)
EOSINOPHIL # BLD AUTO: 0.15 10*3/MM3 (ref 0–0.7)
EOSINOPHIL NFR BLD AUTO: 2 % (ref 0–7)
ERYTHROCYTE [DISTWIDTH] IN BLOOD BY AUTOMATED COUNT: 12.6 % (ref 11.5–14.5)
GFR SERPL CREATININE-BSD FRML MDRD: 134 ML/MIN/1.73 (ref 64–149)
GLOBULIN UR ELPH-MCNC: 3.5 GM/DL (ref 2.3–3.5)
GLUCOSE BLD-MCNC: 87 MG/DL (ref 60–100)
GLUCOSE UR STRIP-MCNC: NEGATIVE MG/DL
HCT VFR BLD AUTO: 37.8 % (ref 35–45)
HGB BLD-MCNC: 12.6 G/DL (ref 12–15.5)
IMM GRANULOCYTES # BLD: 0 10*3/MM3 (ref 0–0.02)
IMM GRANULOCYTES NFR BLD: 0 % (ref 0–0.5)
KETONES UR QL: ABNORMAL
LEUKOCYTE EST, POC: NEGATIVE
LIPASE SERPL-CCNC: 84 U/L (ref 23–300)
LYMPHOCYTES # BLD AUTO: 3.07 10*3/MM3 (ref 0.6–4.2)
LYMPHOCYTES NFR BLD AUTO: 41.3 % (ref 10–50)
MCH RBC QN AUTO: 30.2 PG (ref 26.5–34)
MCHC RBC AUTO-ENTMCNC: 33.3 G/DL (ref 31.4–36)
MCV RBC AUTO: 90.6 FL (ref 80–98)
MONOCYTES # BLD AUTO: 0.51 10*3/MM3 (ref 0–0.9)
MONOCYTES NFR BLD AUTO: 6.9 % (ref 0–12)
NEUTROPHILS # BLD AUTO: 3.69 10*3/MM3 (ref 2–8.6)
NEUTROPHILS NFR BLD AUTO: 49.5 % (ref 37–80)
NITRITE UR-MCNC: NEGATIVE MG/ML
PH UR: 6 [PH] (ref 5–8)
PLATELET # BLD AUTO: 271 10*3/MM3 (ref 150–450)
PMV BLD AUTO: 10.6 FL (ref 8–12)
POTASSIUM BLD-SCNC: 3.6 MMOL/L (ref 3.5–5.1)
PROT SERPL-MCNC: 7.8 G/DL (ref 6.3–8.6)
PROT UR STRIP-MCNC: ABNORMAL MG/DL
RBC # BLD AUTO: 4.17 10*6/MM3 (ref 3.77–5.16)
RBC # UR STRIP: NEGATIVE /UL
SODIUM BLD-SCNC: 143 MMOL/L (ref 137–145)
SP GR UR: 1.02 (ref 1–1.03)
UROBILINOGEN UR QL: NORMAL
WBC NRBC COR # BLD: 7.44 10*3/MM3 (ref 3.2–9.8)

## 2018-01-03 PROCEDURE — 81002 URINALYSIS NONAUTO W/O SCOPE: CPT | Performed by: FAMILY MEDICINE

## 2018-01-03 PROCEDURE — 80053 COMPREHEN METABOLIC PANEL: CPT | Performed by: FAMILY MEDICINE

## 2018-01-03 PROCEDURE — 99214 OFFICE O/P EST MOD 30 MIN: CPT | Performed by: FAMILY MEDICINE

## 2018-01-03 PROCEDURE — 82150 ASSAY OF AMYLASE: CPT

## 2018-01-03 PROCEDURE — 83690 ASSAY OF LIPASE: CPT | Performed by: FAMILY MEDICINE

## 2018-01-03 PROCEDURE — 36415 COLL VENOUS BLD VENIPUNCTURE: CPT

## 2018-01-03 PROCEDURE — 85025 COMPLETE CBC W/AUTO DIFF WBC: CPT | Performed by: FAMILY MEDICINE

## 2018-01-03 RX ORDER — RANITIDINE 150 MG/1
150 TABLET ORAL
Qty: 60 TABLET | Refills: 1 | Status: SHIPPED | OUTPATIENT
Start: 2018-01-03 | End: 2018-02-16

## 2018-01-03 NOTE — PROGRESS NOTES
Subjective   Ligia Hay is a 32 y.o. female.     Abdominal Pain   This is a new problem. The current episode started in the past 7 days. The onset quality is sudden. The problem occurs intermittently. The problem has been rapidly worsening. The pain is located in the RUQ. The pain is at a severity of 8/10. The pain is severe. The quality of the pain is aching. The abdominal pain radiates to the periumbilical region. Associated symptoms include nausea. Pertinent negatives include no diarrhea, fever, hematochezia, hematuria, melena or vomiting.        The following portions of the patient's history were reviewed and updated as appropriate: allergies, current medications, past family history, past medical history, past social history, past surgical history and problem list.    Review of Systems   Constitutional: Negative for fever.   Gastrointestinal: Positive for abdominal pain and nausea. Negative for diarrhea, hematochezia, melena and vomiting.   Genitourinary: Negative for hematuria.       Objective   Physical Exam   Constitutional: She is oriented to person, place, and time. She appears well-developed and well-nourished. No distress.   HENT:   Head: Normocephalic and atraumatic.   Cardiovascular: Normal rate, regular rhythm and normal heart sounds.  Exam reveals no gallop and no friction rub.    No murmur heard.  Pulmonary/Chest: Effort normal and breath sounds normal. No respiratory distress. She has no wheezes. She has no rales. She exhibits no tenderness.   Abdominal: Soft. Normal appearance and bowel sounds are normal. There is no hepatosplenomegaly. There is tenderness in the periumbilical area. There is no rigidity, no rebound, no guarding, no CVA tenderness, no tenderness at McBurney's point and negative Roger's sign.   Neurological: She is alert and oriented to person, place, and time.   Skin: Skin is warm and dry. She is not diaphoretic.   Psychiatric: She has a normal mood and affect. Her  behavior is normal. Judgment and thought content normal.   Nursing note and vitals reviewed.      Assessment/Plan   Problems Addressed this Visit     None      Visit Diagnoses     Right upper quadrant abdominal pain    -  Primary    Relevant Orders    Lipase    CBC & Differential    Amylase    Comprehensive Metabolic Panel

## 2018-01-04 NOTE — PROGRESS NOTES
Pr Dr. Aguilar, Ms. Satnam has been called with her recent lab results & recommendations.  Continue her current medications and follow-up as planned or sooner if any problems.

## 2018-02-16 ENCOUNTER — OFFICE VISIT (OUTPATIENT)
Dept: FAMILY MEDICINE CLINIC | Facility: CLINIC | Age: 33
End: 2018-02-16

## 2018-02-16 VITALS
DIASTOLIC BLOOD PRESSURE: 78 MMHG | HEART RATE: 88 BPM | HEIGHT: 66 IN | OXYGEN SATURATION: 99 % | WEIGHT: 123.25 LBS | BODY MASS INDEX: 19.81 KG/M2 | SYSTOLIC BLOOD PRESSURE: 124 MMHG

## 2018-02-16 DIAGNOSIS — R10.84 GENERALIZED ABDOMINAL PAIN: Primary | ICD-10-CM

## 2018-02-16 PROCEDURE — 99214 OFFICE O/P EST MOD 30 MIN: CPT | Performed by: FAMILY MEDICINE

## 2018-02-16 RX ORDER — OMEPRAZOLE 20 MG/1
20 CAPSULE, DELAYED RELEASE ORAL DAILY
Qty: 30 CAPSULE | Refills: 1 | Status: SHIPPED | OUTPATIENT
Start: 2018-02-16 | End: 2018-03-02 | Stop reason: DRUGHIGH

## 2018-02-16 NOTE — PROGRESS NOTES
Subjective   Ligia Hay is a 32 y.o. female.     Abdominal Pain   This is a new problem. The current episode started in the past 7 days. The onset quality is sudden. The problem occurs intermittently. The problem has been waxing and waning. The pain is located in the RUQ. The pain is at a severity of 8/10. The pain is severe. The quality of the pain is aching. The abdominal pain radiates to the periumbilical region. Pertinent negatives include no diarrhea, fever, hematochezia, hematuria, melena, nausea or vomiting.        The following portions of the patient's history were reviewed and updated as appropriate: allergies, current medications, past family history, past medical history, past social history, past surgical history and problem list.    Review of Systems   Constitutional: Negative for fever.   Gastrointestinal: Positive for abdominal pain. Negative for diarrhea, hematochezia, melena, nausea and vomiting.   Genitourinary: Negative for hematuria.       Objective   Physical Exam   Constitutional: She is oriented to person, place, and time. She appears well-developed and well-nourished. No distress.   HENT:   Head: Normocephalic and atraumatic.   Cardiovascular: Normal rate, regular rhythm and normal heart sounds.  Exam reveals no gallop and no friction rub.    No murmur heard.  Pulmonary/Chest: Effort normal and breath sounds normal. No respiratory distress. She has no wheezes. She has no rales. She exhibits no tenderness.   Abdominal: Soft. Normal appearance and bowel sounds are normal. There is no hepatosplenomegaly. There is tenderness in the periumbilical area. There is no rigidity, no rebound, no guarding, no CVA tenderness, no tenderness at McBurney's point and negative Roger's sign.   Neurological: She is alert and oriented to person, place, and time.   Skin: Skin is warm and dry. She is not diaphoretic.   Psychiatric: She has a normal mood and affect. Her behavior is normal. Judgment and  thought content normal.   Nursing note and vitals reviewed.      Assessment/Plan   Problems Addressed this Visit     None      Visit Diagnoses     Generalized abdominal pain    -  Primary    Relevant Orders    US Gallbladder    Ambulatory Referral to Gastroenterology

## 2018-03-02 ENCOUNTER — APPOINTMENT (OUTPATIENT)
Dept: ULTRASOUND IMAGING | Facility: HOSPITAL | Age: 33
End: 2018-03-02
Attending: FAMILY MEDICINE

## 2018-03-02 ENCOUNTER — HOSPITAL ENCOUNTER (OUTPATIENT)
Facility: HOSPITAL | Age: 33
Setting detail: HOSPITAL OUTPATIENT SURGERY
End: 2018-03-02
Attending: INTERNAL MEDICINE | Admitting: INTERNAL MEDICINE

## 2018-03-02 ENCOUNTER — OFFICE VISIT (OUTPATIENT)
Dept: GASTROENTEROLOGY | Facility: CLINIC | Age: 33
End: 2018-03-02

## 2018-03-02 VITALS
HEART RATE: 83 BPM | SYSTOLIC BLOOD PRESSURE: 118 MMHG | DIASTOLIC BLOOD PRESSURE: 72 MMHG | WEIGHT: 123.2 LBS | HEIGHT: 66 IN | BODY MASS INDEX: 19.8 KG/M2

## 2018-03-02 DIAGNOSIS — R63.4 WEIGHT LOSS: ICD-10-CM

## 2018-03-02 DIAGNOSIS — R10.84 GENERALIZED ABDOMINAL PAIN: Primary | ICD-10-CM

## 2018-03-02 DIAGNOSIS — R11.2 NAUSEA AND VOMITING, INTRACTABILITY OF VOMITING NOT SPECIFIED, UNSPECIFIED VOMITING TYPE: ICD-10-CM

## 2018-03-02 PROCEDURE — 99214 OFFICE O/P EST MOD 30 MIN: CPT | Performed by: NURSE PRACTITIONER

## 2018-03-02 RX ORDER — SODIUM, POTASSIUM,MAG SULFATES 17.5-3.13G
1 SOLUTION, RECONSTITUTED, ORAL ORAL EVERY 12 HOURS
Qty: 2 BOTTLE | Refills: 0 | Status: SHIPPED | OUTPATIENT
Start: 2018-03-02 | End: 2018-07-01

## 2018-03-02 RX ORDER — DEXTROSE AND SODIUM CHLORIDE 5; .45 G/100ML; G/100ML
30 INJECTION, SOLUTION INTRAVENOUS CONTINUOUS PRN
Status: CANCELLED | OUTPATIENT
Start: 2018-03-02

## 2018-03-02 RX ORDER — OMEPRAZOLE 40 MG/1
40 CAPSULE, DELAYED RELEASE ORAL DAILY
Qty: 30 CAPSULE | Refills: 11 | Status: SHIPPED | OUTPATIENT
Start: 2018-03-02 | End: 2018-12-03

## 2018-03-02 NOTE — PROGRESS NOTES
Chief Complaint   Patient presents with   • Nausea   • Abdominal Pain     discomfort       Subjective    Ligia Hay is a 32 y.o. female. she is here today for follow-up.    History of Present Illness  32-year-old female presents to discuss recurrent abdominal pain.  She was last seen in this office 2016 due to upper nominal pain.  Discussed EGD and colonoscopy however she declined to schedule at that time.  Reports since birth of her child about 5 months ago she is having worsening generalized abdominal pain nausea vomiting and abnormal weight loss.  When she delivered her baby she would 173 pounds. States her average weight is about 140 pounds. She is still currently breast-feeding over her weight is down 123 pounds.  She reports issues with constipation and hemorrhoids at times however reports recently her bowel movements have been normal.  States anytime she eats she will have pain nausea and vomiting.  She recently started Prilosec 20 mg per day which helped symptoms somewhat.  Plan; we'll increase Prilosec to 40 mg per day.  Recommend patient proceed with ultrasound scheduled primary care provider's office.  We'll schedule patient for EGD and colonoscopy to evaluate for generalized abdominal pain, nausea vomiting and abnormal weight loss.  Follow-up after test return to office sooner if needed.       The following portions of the patient's history were reviewed and updated as appropriate:   Past Medical History:   Diagnosis Date   • Abnormal Pap smear of cervix     REPEAT PAP SMEAR NORMAL    • Acute otitis externa 10/14/2015   • Acute sinusitis 03/31/2015   • Allergic rhinitis 05/04/2015   • Anxiety 05/27/2016   • Asthma    • Bleeding hemorrhoids 08/03/2015   • Depression    • Endometriosis    • Headache 10/20/2015   • Impacted cerumen 10/14/2015   • Irregular menstruation 12/01/2015   • Irregular periods 06/06/2016   • Knee pain 10/06/2015   • Loss of hair 03/01/2016   • Nonvenomous insect bite  2016   • Pain in eye 10/20/2015   • Palpitations    • Paresthesia 2015   • Pregnancy     A0   • Primary fibromyalgia 2016   • Shoulder joint pain 10/20/2015   • Tremor 2015     Past Surgical History:   Procedure Laterality Date   • DIAGNOSTIC LAPAROSCOPY      DR. AGUERO    • ENDOSCOPY  2016    Normal   • MOUTH SURGERY      [place] teeth extraction   • WISDOM TOOTH EXTRACTION       Family History   Problem Relation Age of Onset   • Hypertension Mother    • Hypertension Father    • Migraines Father    • Hypertension Other    • Asthma Brother    • Migraines Brother    • Heart disease Sister    • Hypertension Paternal Grandmother    • Heart attack Maternal Grandmother    • Hypertension Maternal Grandmother    • Cancer Maternal Grandfather      OB History      Para Term  AB Living    1 1 1   1    SAB TAB Ectopic Multiple Live Births       0 1        Current Outpatient Prescriptions   Medication Sig Dispense Refill   • dicloxacillin (DYNAPEN) 500 MG capsule Take 1 capsule by mouth 4 (Four) Times a Day. 60 capsule 0   • docusate sodium (COLACE) 100 MG capsule Take 1 capsule by mouth Daily As Needed for Constipation. 30 capsule 10   • ibuprofen (ADVIL,MOTRIN) 600 MG tablet Take 1 tablet by mouth Every 6 (Six) Hours. 30 tablet 0   • labetalol (NORMODYNE) 100 MG tablet Take 1 tablet by mouth Daily. 30 tablet 5   • norethindrone (MICRONOR) 0.35 MG tablet Take 1 tablet by mouth Daily. 28 tablet 12   • Prenatal Multivit-Min-Fe-FA (PRENATAL VITAMINS PO) Take  by mouth.     • Probiotic Product (PROBIOTIC DAILY PO) Take  by mouth.     • omeprazole (priLOSEC) 40 MG capsule Take 1 capsule by mouth Daily. 30 capsule 11   • sodium-potassium-magnesium sulfates (SUPREP BOWEL PREP KIT) 17.5-3.13-1.6 GM/180ML solution oral solution Take 1 bottle by mouth Every 12 (Twelve) Hours. 2 bottle 0     No current facility-administered medications for this visit.      Allergies   Allergen Reactions  "  • Fish-Derived Products Nausea And Vomiting   • Adhesive Tape Itching and Rash     Social History     Social History   • Marital status: Single     Social History Main Topics   • Smoking status: Current Every Day Smoker     Types: Cigarettes     Last attempt to quit: 1/27/2017   • Smokeless tobacco: Never Used      Comment: smokes less than a half a pack a day    • Alcohol use No      Comment: Occassionally pre pregnancy   • Drug use: No   • Sexual activity: Not Currently     Partners: Male      Comment: wants OCP        Review of Systems  Review of Systems   Constitutional: Positive for appetite change and unexpected weight change. Negative for activity change, chills, diaphoresis, fatigue and fever.   HENT: Negative for sore throat and trouble swallowing.    Respiratory: Negative for shortness of breath.    Gastrointestinal: Positive for abdominal pain, nausea and vomiting. Negative for abdominal distention, anal bleeding, blood in stool, constipation, diarrhea and rectal pain.   Musculoskeletal: Negative for arthralgias.   Skin: Negative for pallor.   Neurological: Negative for light-headedness.        /72 (BP Location: Right arm, Patient Position: Sitting, Cuff Size: Adult)  Pulse 83  Ht 167.6 cm (65.98\")  Wt 55.9 kg (123 lb 3.2 oz)  LMP  (LMP Unknown)  BMI 19.89 kg/m2    Objective    Physical Exam   Constitutional: She is oriented to person, place, and time. She appears well-developed and well-nourished. She is cooperative. No distress.   HENT:   Head: Normocephalic and atraumatic.   Neck: Normal range of motion. Neck supple. No thyromegaly present.   Cardiovascular: Normal rate, regular rhythm and normal heart sounds.    Pulmonary/Chest: Effort normal and breath sounds normal. She has no wheezes. She has no rhonchi. She has no rales.   Abdominal: Soft. Normal appearance and bowel sounds are normal. She exhibits no shifting dullness and no distension. There is no hepatosplenomegaly. There is " generalized tenderness. There is no rigidity and no guarding. No hernia.   Lymphadenopathy:     She has no cervical adenopathy.   Neurological: She is alert and oriented to person, place, and time.   Skin: Skin is warm, dry and intact. No rash noted. No pallor.   Psychiatric: She has a normal mood and affect. Her speech is normal.     Lab on 01/03/2018   Component Date Value Ref Range Status   • Amylase 01/03/2018 92  50 - 130 U/L Final     Assessment/Plan      1. Generalized abdominal pain    2. Nausea and vomiting, intractability of vomiting not specified, unspecified vomiting type    3. Weight loss    .       Orders placed during this encounter include:      ESOPHAGOGASTRODUODENOSCOPY (N/A), COLONOSCOPY (N/A)    Review and/or summary of lab tests, radiology, procedures, medications. Review and summary of old records and obtaining of history. The risks and benefits of my recommendations, as well as other treatment options were discussed with the patient today. Questions were answered.    New Medications Ordered This Visit   Medications   • sodium-potassium-magnesium sulfates (SUPREP BOWEL PREP KIT) 17.5-3.13-1.6 GM/180ML solution oral solution     Sig: Take 1 bottle by mouth Every 12 (Twelve) Hours.     Dispense:  2 bottle     Refill:  0   • omeprazole (priLOSEC) 40 MG capsule     Sig: Take 1 capsule by mouth Daily.     Dispense:  30 capsule     Refill:  11       Follow-up: Return in about 4 weeks (around 3/30/2018).          This document has been electronically signed by CATHERINE Ortiz on March 2, 2018 2:08 PM             Results for orders placed or performed in visit on 01/03/18   Amylase   Result Value Ref Range    Amylase 92 50 - 130 U/L   Results for orders placed or performed in visit on 01/03/18   CBC Auto Differential   Result Value Ref Range    WBC 7.44 3.20 - 9.80 10*3/mm3    RBC 4.17 3.77 - 5.16 10*6/mm3    Hemoglobin 12.6 12.0 - 15.5 g/dL    Hematocrit 37.8 35.0 - 45.0 %    MCV 90.6 80.0 - 98.0  fL    MCH 30.2 26.5 - 34.0 pg    MCHC 33.3 31.4 - 36.0 g/dL    RDW 12.6 11.5 - 14.5 %    RDW-SD 42.1 36.4 - 46.3 fl    MPV 10.6 8.0 - 12.0 fL    Platelets 271 150 - 450 10*3/mm3    Neutrophil % 49.5 37.0 - 80.0 %    Lymphocyte % 41.3 10.0 - 50.0 %    Monocyte % 6.9 0.0 - 12.0 %    Eosinophil % 2.0 0.0 - 7.0 %    Basophil % 0.3 0.0 - 2.0 %    Immature Grans % 0.0 0.0 - 0.5 %    Neutrophils, Absolute 3.69 2.00 - 8.60 10*3/mm3    Lymphocytes, Absolute 3.07 0.60 - 4.20 10*3/mm3    Monocytes, Absolute 0.51 0.00 - 0.90 10*3/mm3    Eosinophils, Absolute 0.15 0.00 - 0.70 10*3/mm3    Basophils, Absolute 0.02 0.00 - 0.20 10*3/mm3    Immature Grans, Absolute 0.00 0.00 - 0.02 10*3/mm3   POCT urinalysis dipstick, manual   Result Value Ref Range    Color Orange (A) Yellow, Straw, Dark Yellow, Glendy    Clarity, UA Cloudy (A) Clear    Glucose, UA Negative Negative, 1000 mg/dL (3+) mg/dL    Bilirubin 1 mg/dL (A) Negative    Ketones, UA 50 mg/dL (A) Negative    Specific Gravity  1.020 1.005 - 1.030    Blood, UA Negative Negative    pH, Urine 6.0 5.0 - 8.0    Protein, POC Trace (A) Negative mg/dL    Urobilinogen, UA Normal Normal    Leukocytes Negative Negative    Nitrite, UA Negative Negative   Lipase   Result Value Ref Range    Lipase 84 23 - 300 U/L   Comprehensive Metabolic Panel   Result Value Ref Range    Glucose 87 60 - 100 mg/dL    BUN 11 7 - 21 mg/dL    Creatinine 0.63 0.50 - 1.00 mg/dL    Sodium 143 137 - 145 mmol/L    Potassium 3.6 3.5 - 5.1 mmol/L    Chloride 104 95 - 110 mmol/L    CO2 26.0 22.0 - 31.0 mmol/L    Calcium 9.1 8.4 - 10.2 mg/dL    Total Protein 7.8 6.3 - 8.6 g/dL    Albumin 4.30 3.40 - 4.80 g/dL    ALT (SGPT) 25 9 - 52 U/L    AST (SGOT) 40 (H) 14 - 36 U/L    Alkaline Phosphatase 73 38 - 126 U/L    Total Bilirubin 0.3 0.2 - 1.3 mg/dL    eGFR   Amer 134 64 - 149 mL/min/1.73    Globulin 3.5 2.3 - 3.5 gm/dL    A/G Ratio 1.2 1.1 - 1.8 g/dL    BUN/Creatinine Ratio 17.5 7.0 - 25.0    Anion Gap 13.0 5.0 - 15.0  mmol/L   Results for orders placed or performed in visit on 10/16/17   Protein, Urine, 24 Hour - Urine, Clean Catch   Result Value Ref Range    Protein, 24H Urine 211.5 0.0 - 230.0 mg/24hours    Total Protein, Urine 14.1 mg/dL    24H Urine Volume 1500 mL    Time (Hours) 24 hrs   Results for orders placed or performed in visit on 10/12/17   CBC (No Diff)   Result Value Ref Range    WBC 7.32 3.20 - 9.80 10*3/mm3    RBC 4.11 3.77 - 5.16 10*6/mm3    Hemoglobin 12.9 12.0 - 15.5 g/dL    Hematocrit 38.6 35.0 - 45.0 %    MCV 93.9 80.0 - 98.0 fL    MCH 31.4 26.5 - 34.0 pg    MCHC 33.4 31.4 - 36.0 g/dL    RDW 13.2 11.5 - 14.5 %    RDW-SD 45.9 36.4 - 46.3 fl    MPV 9.8 8.0 - 12.0 fL    Platelets 358 150 - 450 10*3/mm3   Comprehensive Metabolic Panel   Result Value Ref Range    Glucose 70 60 - 100 mg/dL    BUN 6 (L) 7 - 21 mg/dL    Creatinine 0.78 0.50 - 1.00 mg/dL    Sodium 140 137 - 145 mmol/L    Potassium 3.7 3.5 - 5.1 mmol/L    Chloride 103 95 - 110 mmol/L    CO2 26.0 22.0 - 31.0 mmol/L    Calcium 9.2 8.4 - 10.2 mg/dL    Total Protein 7.2 6.3 - 8.6 g/dL    Albumin 4.00 3.40 - 4.80 g/dL    ALT (SGPT) 36 9 - 52 U/L    AST (SGOT) 24 14 - 36 U/L    Alkaline Phosphatase 81 38 - 126 U/L    Total Bilirubin 0.5 0.2 - 1.3 mg/dL    eGFR   Amer 104 64 - 149 mL/min/1.73    Globulin 3.2 2.3 - 3.5 gm/dL    A/G Ratio 1.3 1.1 - 1.8 g/dL    BUN/Creatinine Ratio 7.7 7.0 - 25.0    Anion Gap 11.0 5.0 - 15.0 mmol/L   Results for orders placed or performed in visit on 10/05/17   Urinalysis, Microscopic Only - Urine, Clean Catch   Result Value Ref Range    RBC, UA 3-5 (A) None Seen /HPF    WBC, UA 21-30 (A) None Seen, 0-2, 3-5 /HPF    Bacteria, UA None Seen None Seen /HPF    Squamous Epithelial Cells, UA None Seen None Seen, 0-2 /HPF    Hyaline Casts, UA 0-2 None Seen /LPF    Methodology Automated Microscopy    Urinalysis With / Microscopic If Indicated - Urine, Clean Catch   Result Value Ref Range    Color, UA Yellow Yellow, Straw, Dark  Yellow, Glendy    Appearance, UA Clear Clear    pH, UA 6.5 5.0 - 9.0    Specific Gravity, UA 1.016 1.003 - 1.030    Glucose, UA Negative Negative    Ketones, UA 15 mg/dL (1+) (A) Negative    Bilirubin, UA Negative Negative    Blood, UA Moderate (2+) (A) Negative    Protein, UA Negative Negative    Leuk Esterase, UA Large (3+) (A) Negative    Nitrite, UA Negative Negative    Urobilinogen, UA 0.2 E.U./dL 0.2 - 1.0 E.U./dL     *Note: Due to a large number of results and/or encounters for the requested time period, some results have not been displayed. A complete set of results can be found in Results Review.

## 2018-03-10 ENCOUNTER — APPOINTMENT (OUTPATIENT)
Dept: GENERAL RADIOLOGY | Facility: HOSPITAL | Age: 33
End: 2018-03-10

## 2018-03-10 ENCOUNTER — APPOINTMENT (OUTPATIENT)
Dept: CT IMAGING | Facility: HOSPITAL | Age: 33
End: 2018-03-10

## 2018-03-10 ENCOUNTER — HOSPITAL ENCOUNTER (EMERGENCY)
Facility: HOSPITAL | Age: 33
Discharge: HOME | End: 2018-03-10
Attending: FAMILY MEDICINE | Admitting: FAMILY MEDICINE

## 2018-03-10 VITALS
HEART RATE: 62 BPM | WEIGHT: 123 LBS | RESPIRATION RATE: 16 BRPM | HEIGHT: 66 IN | SYSTOLIC BLOOD PRESSURE: 151 MMHG | BODY MASS INDEX: 19.77 KG/M2 | DIASTOLIC BLOOD PRESSURE: 82 MMHG | OXYGEN SATURATION: 100 % | TEMPERATURE: 98 F

## 2018-03-10 DIAGNOSIS — R51.9 ACUTE NONINTRACTABLE HEADACHE, UNSPECIFIED HEADACHE TYPE: Primary | ICD-10-CM

## 2018-03-10 DIAGNOSIS — R53.1 WEAKNESS GENERALIZED: ICD-10-CM

## 2018-03-10 LAB
ALBUMIN SERPL-MCNC: 4.1 G/DL (ref 3.4–4.8)
ALBUMIN/GLOB SERPL: 1.3 G/DL (ref 1.1–1.8)
ALP SERPL-CCNC: 88 U/L (ref 38–126)
ALT SERPL W P-5'-P-CCNC: 34 U/L (ref 9–52)
AMPHET+METHAMPHET UR QL: NEGATIVE
ANION GAP SERPL CALCULATED.3IONS-SCNC: 14 MMOL/L (ref 5–15)
AST SERPL-CCNC: 23 U/L (ref 14–36)
B-HCG UR QL: NEGATIVE
BARBITURATES UR QL SCN: NEGATIVE
BASOPHILS # BLD AUTO: 0.01 10*3/MM3 (ref 0–0.2)
BASOPHILS NFR BLD AUTO: 0.1 % (ref 0–2)
BENZODIAZ UR QL SCN: NEGATIVE
BILIRUB SERPL-MCNC: 0.4 MG/DL (ref 0.2–1.3)
BILIRUB UR QL STRIP: ABNORMAL
BUN BLD-MCNC: 12 MG/DL (ref 7–21)
BUN/CREAT SERPL: 18.5 (ref 7–25)
CALCIUM SPEC-SCNC: 9.2 MG/DL (ref 8.4–10.2)
CANNABINOIDS SERPL QL: NEGATIVE
CHLORIDE SERPL-SCNC: 103 MMOL/L (ref 95–110)
CK SERPL-CCNC: 78 U/L (ref 30–135)
CLARITY UR: CLEAR
CO2 SERPL-SCNC: 23 MMOL/L (ref 22–31)
COCAINE UR QL: NEGATIVE
COLOR UR: YELLOW
CREAT BLD-MCNC: 0.65 MG/DL (ref 0.5–1)
DEPRECATED RDW RBC AUTO: 42.3 FL (ref 36.4–46.3)
EOSINOPHIL # BLD AUTO: 0.15 10*3/MM3 (ref 0–0.7)
EOSINOPHIL NFR BLD AUTO: 1.9 % (ref 0–7)
ERYTHROCYTE [DISTWIDTH] IN BLOOD BY AUTOMATED COUNT: 12.7 % (ref 11.5–14.5)
GFR SERPL CREATININE-BSD FRML MDRD: 129 ML/MIN/1.73 (ref 64–149)
GLOBULIN UR ELPH-MCNC: 3.1 GM/DL (ref 2.3–3.5)
GLUCOSE BLD-MCNC: 67 MG/DL (ref 60–100)
GLUCOSE UR STRIP-MCNC: NEGATIVE MG/DL
HCG SERPL QL: NEGATIVE
HCT VFR BLD AUTO: 34.9 % (ref 35–45)
HGB BLD-MCNC: 12.1 G/DL (ref 12–15.5)
HGB UR QL STRIP.AUTO: NEGATIVE
IMM GRANULOCYTES # BLD: 0.01 10*3/MM3 (ref 0–0.02)
IMM GRANULOCYTES NFR BLD: 0.1 % (ref 0–0.5)
INR PPP: 1.14 (ref 0.8–1.2)
KETONES UR QL STRIP: NEGATIVE
LEUKOCYTE ESTERASE UR QL STRIP.AUTO: NEGATIVE
LYMPHOCYTES # BLD AUTO: 2.82 10*3/MM3 (ref 0.6–4.2)
LYMPHOCYTES NFR BLD AUTO: 36.1 % (ref 10–50)
MAGNESIUM SERPL-MCNC: 1.9 MG/DL (ref 1.6–2.3)
MCH RBC QN AUTO: 31.5 PG (ref 26.5–34)
MCHC RBC AUTO-ENTMCNC: 34.7 G/DL (ref 31.4–36)
MCV RBC AUTO: 90.9 FL (ref 80–98)
METHADONE UR QL SCN: NEGATIVE
MONOCYTES # BLD AUTO: 0.47 10*3/MM3 (ref 0–0.9)
MONOCYTES NFR BLD AUTO: 6 % (ref 0–12)
NEUTROPHILS # BLD AUTO: 4.36 10*3/MM3 (ref 2–8.6)
NEUTROPHILS NFR BLD AUTO: 55.8 % (ref 37–80)
NITRITE UR QL STRIP: NEGATIVE
OPIATES UR QL: NEGATIVE
OXYCODONE UR QL SCN: NEGATIVE
PH UR STRIP.AUTO: 6.5 [PH] (ref 5–9)
PLATELET # BLD AUTO: 234 10*3/MM3 (ref 150–450)
PMV BLD AUTO: 9.7 FL (ref 8–12)
POTASSIUM BLD-SCNC: 3.5 MMOL/L (ref 3.5–5.1)
PROT SERPL-MCNC: 7.2 G/DL (ref 6.3–8.6)
PROT UR QL STRIP: NEGATIVE
PROTHROMBIN TIME: 14.3 SECONDS (ref 11.1–15.3)
RBC # BLD AUTO: 3.84 10*6/MM3 (ref 3.77–5.16)
SODIUM BLD-SCNC: 140 MMOL/L (ref 137–145)
SP GR UR STRIP: 1.03 (ref 1–1.03)
TSH SERPL DL<=0.05 MIU/L-ACNC: 1.18 MIU/ML (ref 0.46–4.68)
UROBILINOGEN UR QL STRIP: ABNORMAL
WBC NRBC COR # BLD: 7.82 10*3/MM3 (ref 3.2–9.8)

## 2018-03-10 PROCEDURE — 80307 DRUG TEST PRSMV CHEM ANLYZR: CPT

## 2018-03-10 PROCEDURE — 80053 COMPREHEN METABOLIC PANEL: CPT | Performed by: FAMILY MEDICINE

## 2018-03-10 PROCEDURE — 96361 HYDRATE IV INFUSION ADD-ON: CPT

## 2018-03-10 PROCEDURE — 82550 ASSAY OF CK (CPK): CPT | Performed by: FAMILY MEDICINE

## 2018-03-10 PROCEDURE — 93005 ELECTROCARDIOGRAM TRACING: CPT | Performed by: FAMILY MEDICINE

## 2018-03-10 PROCEDURE — 81003 URINALYSIS AUTO W/O SCOPE: CPT

## 2018-03-10 PROCEDURE — 84443 ASSAY THYROID STIM HORMONE: CPT | Performed by: FAMILY MEDICINE

## 2018-03-10 PROCEDURE — 25010000002 KETOROLAC TROMETHAMINE PER 15 MG: Performed by: FAMILY MEDICINE

## 2018-03-10 PROCEDURE — 85610 PROTHROMBIN TIME: CPT | Performed by: FAMILY MEDICINE

## 2018-03-10 PROCEDURE — 99283 EMERGENCY DEPT VISIT LOW MDM: CPT

## 2018-03-10 PROCEDURE — 71046 X-RAY EXAM CHEST 2 VIEWS: CPT

## 2018-03-10 PROCEDURE — 25010000002 ONDANSETRON PER 1 MG: Performed by: FAMILY MEDICINE

## 2018-03-10 PROCEDURE — 70450 CT HEAD/BRAIN W/O DYE: CPT

## 2018-03-10 PROCEDURE — 96375 TX/PRO/DX INJ NEW DRUG ADDON: CPT

## 2018-03-10 PROCEDURE — 84703 CHORIONIC GONADOTROPIN ASSAY: CPT | Performed by: FAMILY MEDICINE

## 2018-03-10 PROCEDURE — 93010 ELECTROCARDIOGRAM REPORT: CPT | Performed by: INTERNAL MEDICINE

## 2018-03-10 PROCEDURE — 81025 URINE PREGNANCY TEST: CPT

## 2018-03-10 PROCEDURE — 83735 ASSAY OF MAGNESIUM: CPT | Performed by: FAMILY MEDICINE

## 2018-03-10 PROCEDURE — 96374 THER/PROPH/DIAG INJ IV PUSH: CPT

## 2018-03-10 PROCEDURE — 85025 COMPLETE CBC W/AUTO DIFF WBC: CPT | Performed by: FAMILY MEDICINE

## 2018-03-10 RX ORDER — KETOROLAC TROMETHAMINE 10 MG/1
10 TABLET, FILM COATED ORAL EVERY 6 HOURS PRN
Qty: 10 TABLET | Refills: 0 | Status: SHIPPED | OUTPATIENT
Start: 2018-03-10 | End: 2018-08-17

## 2018-03-10 RX ORDER — ONDANSETRON 4 MG/1
4 TABLET, ORALLY DISINTEGRATING ORAL EVERY 6 HOURS PRN
Qty: 10 TABLET | Refills: 0 | Status: SHIPPED | OUTPATIENT
Start: 2018-03-10 | End: 2018-10-03

## 2018-03-10 RX ORDER — KETOROLAC TROMETHAMINE 15 MG/ML
15 INJECTION, SOLUTION INTRAMUSCULAR; INTRAVENOUS EVERY 6 HOURS PRN
Status: DISCONTINUED | OUTPATIENT
Start: 2018-03-10 | End: 2018-03-10 | Stop reason: HOSPADM

## 2018-03-10 RX ORDER — ONDANSETRON 2 MG/ML
4 INJECTION INTRAMUSCULAR; INTRAVENOUS ONCE
Status: COMPLETED | OUTPATIENT
Start: 2018-03-10 | End: 2018-03-10

## 2018-03-10 RX ADMIN — KETOROLAC TROMETHAMINE 15 MG: 15 INJECTION, SOLUTION INTRAMUSCULAR; INTRAVENOUS at 17:17

## 2018-03-10 RX ADMIN — SODIUM CHLORIDE 1000 ML: 900 INJECTION, SOLUTION INTRAVENOUS at 17:20

## 2018-03-10 RX ADMIN — ONDANSETRON 4 MG: 2 INJECTION INTRAMUSCULAR; INTRAVENOUS at 17:17

## 2018-03-10 NOTE — ED PROVIDER NOTES
Subjective     Headache   Pain location:  Occipital  Quality:  Sharp  Radiates to: left face.  Severity currently:  8/10  Severity at highest:  8/10  Duration:  1 day  Timing:  Intermittent  Progression:  Waxing and waning  Chronicity:  New  Context: activity    Relieved by:  NSAIDs  Worsened by:  Nothing  Associated symptoms: abdominal pain (chronic), blurred vision, congestion, dizziness, ear pain, eye pain, facial pain, fatigue and nausea    Associated symptoms: no back pain, no cough, no diarrhea, no drainage, no fever, no focal weakness, no loss of balance, no near-syncope, no neck pain, no neck stiffness, no numbness, no paresthesias, no photophobia, no seizures, no sore throat, no syncope, no tingling, no vomiting and no weakness        Review of Systems   Constitutional: Positive for fatigue. Negative for fever.   HENT: Positive for congestion and ear pain. Negative for postnasal drip and sore throat.    Eyes: Positive for blurred vision and pain. Negative for photophobia.   Respiratory: Negative for cough.    Cardiovascular: Negative for syncope and near-syncope.   Gastrointestinal: Positive for abdominal pain (chronic) and nausea. Negative for diarrhea and vomiting.   Musculoskeletal: Negative for back pain, neck pain and neck stiffness.   Neurological: Positive for dizziness and headaches. Negative for focal weakness, seizures, weakness, numbness, paresthesias and loss of balance.       Past Medical History:   Diagnosis Date   • Abnormal Pap smear of cervix     REPEAT PAP SMEAR NORMAL    • Acute otitis externa 10/14/2015   • Acute sinusitis 03/31/2015   • Allergic rhinitis 05/04/2015   • Anxiety 05/27/2016   • Asthma    • Bleeding hemorrhoids 08/03/2015   • Depression    • Endometriosis    • Headache 10/20/2015   • Impacted cerumen 10/14/2015   • Irregular menstruation 12/01/2015   • Irregular periods 06/06/2016   • Knee pain 10/06/2015   • Loss of hair 03/01/2016   • Nonvenomous insect bite 07/05/2016    • Pain in eye 10/20/2015   • Palpitations    • Paresthesia 2015   • Pregnancy     A0   • Primary fibromyalgia 2016   • Shoulder joint pain 10/20/2015   • Tremor 2015       Allergies   Allergen Reactions   • Fish-Derived Products Nausea And Vomiting   • Adhesive Tape Itching and Rash       Past Surgical History:   Procedure Laterality Date   • DIAGNOSTIC LAPAROSCOPY      DR. AGUERO    • ENDOSCOPY  2016    Normal   • MOUTH SURGERY      [place] teeth extraction   • WISDOM TOOTH EXTRACTION         Family History   Problem Relation Age of Onset   • Hypertension Mother    • Hypertension Father    • Migraines Father    • Hypertension Other    • Asthma Brother    • Migraines Brother    • Heart disease Sister    • Hypertension Paternal Grandmother    • Heart attack Maternal Grandmother    • Hypertension Maternal Grandmother    • Cancer Maternal Grandfather        Social History     Social History   • Marital status: Single     Social History Main Topics   • Smoking status: Current Every Day Smoker     Types: Cigarettes     Last attempt to quit: 2017   • Smokeless tobacco: Never Used      Comment: smokes less than a half a pack a day    • Alcohol use No      Comment: Occassionally pre pregnancy   • Drug use: No   • Sexual activity: Not Currently     Partners: Male      Comment: wants OCP      Other Topics Concern   • Not on file           Objective   Physical Exam   Constitutional: She is oriented to person, place, and time. She appears well-developed and well-nourished.   HENT:   Head: Normocephalic and atraumatic.   Nose: Nose normal.   Mouth/Throat: Oropharynx is clear and moist.   Eyes: Conjunctivae and EOM are normal. Pupils are equal, round, and reactive to light. Right eye exhibits no discharge. Left eye exhibits no discharge. No scleral icterus.   Neck: Normal range of motion. Neck supple. No tracheal deviation present.   Cardiovascular: Normal rate, regular rhythm and normal  heart sounds.    No murmur heard.  Pulmonary/Chest: Effort normal and breath sounds normal. No stridor. No respiratory distress. She has no wheezes. She has no rales.   Abdominal: Soft. Bowel sounds are normal. She exhibits no distension and no mass. There is no tenderness. There is no rebound and no guarding.   Musculoskeletal: She exhibits no edema.   Neurological: She is alert and oriented to person, place, and time. Coordination normal.   Skin: Skin is warm and dry. No rash noted. No erythema.   Psychiatric: She has a normal mood and affect. Her behavior is normal. Thought content normal.   Nursing note and vitals reviewed.      ECG 12 Lead    Date/Time: 3/10/2018 8:31 PM  Performed by: HIGINIO CHOUDHURY  Authorized by: HIGINIO CHOUDHURY   Rhythm: sinus rhythm  Rate: normal  BPM: 64  ST Segments: ST segments normal                 ED Course  ED Course          Labs Reviewed   URINALYSIS W/ CULTURE IF INDICATED - Abnormal; Notable for the following:        Result Value    Bilirubin, UA Small (1+) (*)     All other components within normal limits    Narrative:     Urine microscopic not indicated.   CBC WITH AUTO DIFFERENTIAL - Abnormal; Notable for the following:     Hematocrit 34.9 (*)     All other components within normal limits   PREGNANCY, URINE - Normal   URINE DRUG SCREEN - Normal    Narrative:     Negative Thresholds For Drugs Screened in Urine:     Amphetamines          500 ng/ml  Barbiturates          200 ng/ml  Benzodiazepines       200 ng/ml  Cocaine               150 ng/ml  Methadone             300 ng/mL  Opiates               300 ng/mL  Oxycodone             100 ng/mL  THC                   20 ng/mL    The normal value for all drugs tested is negative. This report includes final unconfirmed screening results.  A positive result by this assay can be, at your request, sent to the Reference Lab for confirmation by gas chromatography. Unconfirmed results must not be used for non-medical purposes,  such as employment or legal testing. Clinical consideration should be applied to any drug of abuse test result, particularly when unconfirmed results are used.   COMPREHENSIVE METABOLIC PANEL - Normal   PROTIME-INR - Normal    Narrative:     Therapeutic range for most indications is 2.0-3.0 INR,  or 2.5-3.5 for mechanical heart valves.   HCG, SERUM, QUALITATIVE - Normal   CK - Normal   MAGNESIUM - Normal   TSH - Normal   CBC AND DIFFERENTIAL    Narrative:     The following orders were created for panel order CBC & Differential.  Procedure                               Abnormality         Status                     ---------                               -----------         ------                     CBC Auto Differential[[id number]]        Abnormal            Final result                 Please view results for these tests on the individual orders.       XR Chest 2 View   Final Result   CONCLUSION:          1. No evidence of an active cardiopulmonary process.                                                       Electronically signed by:  LUZ Cleveland MD  3/10/2018 5:46   PM CST Workstation: [id number]      CT Head Without Contrast   Final Result   CONCLUSION:     1.  Negative examination for acute intracranial pathology.             If signs or symptoms persist beyond reasonable expectations, a   MRI examination is suggested as is deemed clinically appropriate.                            Electronically signed by:  LUZ Cleveland MD  3/10/2018 4:34   PM CST Workstation: [id number]                    [facility]    Final diagnoses:   Acute nonintractable headache, unspecified headache type   Weakness generalized            Mamadou Moore MD  03/10/18 2032

## 2018-03-30 ENCOUNTER — OFFICE VISIT (OUTPATIENT)
Dept: FAMILY MEDICINE CLINIC | Facility: CLINIC | Age: 33
End: 2018-03-30

## 2018-03-30 VITALS
DIASTOLIC BLOOD PRESSURE: 82 MMHG | HEIGHT: 66 IN | SYSTOLIC BLOOD PRESSURE: 136 MMHG | BODY MASS INDEX: 19.77 KG/M2 | WEIGHT: 123 LBS | OXYGEN SATURATION: 99 % | HEART RATE: 91 BPM

## 2018-03-30 DIAGNOSIS — J01.00 ACUTE NON-RECURRENT MAXILLARY SINUSITIS: Primary | ICD-10-CM

## 2018-03-30 PROCEDURE — 99213 OFFICE O/P EST LOW 20 MIN: CPT | Performed by: FAMILY MEDICINE

## 2018-03-30 RX ORDER — AMOXICILLIN 500 MG/1
500 CAPSULE ORAL 3 TIMES DAILY
Qty: 21 CAPSULE | Refills: 0 | Status: SHIPPED | OUTPATIENT
Start: 2018-03-30 | End: 2018-04-06

## 2018-03-30 NOTE — PROGRESS NOTES
Subjective   Ligia Hay is a 32 y.o. female.     URI    This is a new problem. The current episode started in the past 7 days. The problem has been unchanged. There has been no fever. Associated symptoms include congestion, a plugged ear sensation, sinus pain and sneezing.        The following portions of the patient's history were reviewed and updated as appropriate: allergies, current medications, past family history, past medical history, past social history, past surgical history and problem list.    Review of Systems   HENT: Positive for congestion, sinus pain and sneezing.        Objective   Physical Exam   Constitutional: She is oriented to person, place, and time. She appears well-developed and well-nourished. No distress.   HENT:   Head: Normocephalic and atraumatic.   Nose: Mucosal edema and rhinorrhea present. Right sinus exhibits maxillary sinus tenderness. Right sinus exhibits no frontal sinus tenderness. Left sinus exhibits maxillary sinus tenderness. Left sinus exhibits no frontal sinus tenderness.   Mouth/Throat: Uvula is midline. Posterior oropharyngeal erythema present. No oropharyngeal exudate or posterior oropharyngeal edema.   Pulmonary/Chest: Effort normal and breath sounds normal. No respiratory distress. She has no wheezes. She has no rales. She exhibits no tenderness.   Neurological: She is alert and oriented to person, place, and time.   Skin: Skin is warm and dry. She is not diaphoretic.   Psychiatric: She has a normal mood and affect. Her behavior is normal. Judgment and thought content normal.   Nursing note and vitals reviewed.      Assessment/Plan   Problems Addressed this Visit     None      Visit Diagnoses     Acute non-recurrent maxillary sinusitis    -  Primary

## 2018-05-14 RX ORDER — LABETALOL 100 MG/1
100 TABLET, FILM COATED ORAL DAILY
Qty: 30 TABLET | Refills: 2 | Status: SHIPPED | OUTPATIENT
Start: 2018-05-14 | End: 2018-08-09 | Stop reason: SDUPTHER

## 2018-08-09 ENCOUNTER — TELEPHONE (OUTPATIENT)
Dept: FAMILY MEDICINE CLINIC | Facility: CLINIC | Age: 33
End: 2018-08-09

## 2018-08-09 RX ORDER — LABETALOL 100 MG/1
100 TABLET, FILM COATED ORAL DAILY
Qty: 30 TABLET | Refills: 0 | Status: SHIPPED | OUTPATIENT
Start: 2018-08-09 | End: 2018-08-17

## 2018-08-17 ENCOUNTER — APPOINTMENT (OUTPATIENT)
Dept: LAB | Facility: HOSPITAL | Age: 33
End: 2018-08-17

## 2018-08-17 ENCOUNTER — OFFICE VISIT (OUTPATIENT)
Dept: FAMILY MEDICINE CLINIC | Facility: CLINIC | Age: 33
End: 2018-08-17

## 2018-08-17 ENCOUNTER — TELEPHONE (OUTPATIENT)
Dept: FAMILY MEDICINE CLINIC | Facility: CLINIC | Age: 33
End: 2018-08-17

## 2018-08-17 VITALS
BODY MASS INDEX: 18.47 KG/M2 | WEIGHT: 114.9 LBS | OXYGEN SATURATION: 100 % | HEART RATE: 81 BPM | DIASTOLIC BLOOD PRESSURE: 82 MMHG | SYSTOLIC BLOOD PRESSURE: 120 MMHG | HEIGHT: 66 IN

## 2018-08-17 DIAGNOSIS — R63.4 WEIGHT LOSS, UNINTENTIONAL: Primary | ICD-10-CM

## 2018-08-17 DIAGNOSIS — R10.84 GENERALIZED ABDOMINAL PAIN: ICD-10-CM

## 2018-08-17 DIAGNOSIS — J30.89 ACUTE NON-SEASONAL ALLERGIC RHINITIS, UNSPECIFIED TRIGGER: ICD-10-CM

## 2018-08-17 DIAGNOSIS — Z79.899 HIGH RISK MEDICATION USE: ICD-10-CM

## 2018-08-17 LAB
ALBUMIN SERPL-MCNC: 4.4 G/DL (ref 3.4–4.8)
ALBUMIN/GLOB SERPL: 1.3 G/DL (ref 1.1–1.8)
ALP SERPL-CCNC: 90 U/L (ref 38–126)
ALT SERPL W P-5'-P-CCNC: 17 U/L (ref 9–52)
ANION GAP SERPL CALCULATED.3IONS-SCNC: 8 MMOL/L (ref 5–15)
AST SERPL-CCNC: 39 U/L (ref 14–36)
BASOPHILS # BLD AUTO: 0.02 10*3/MM3 (ref 0–0.2)
BASOPHILS NFR BLD AUTO: 0.4 % (ref 0–2)
BILIRUB SERPL-MCNC: 0.8 MG/DL (ref 0.2–1.3)
BUN BLD-MCNC: 12 MG/DL (ref 7–21)
BUN/CREAT SERPL: 19.4 (ref 7–25)
CALCIUM SPEC-SCNC: 9 MG/DL (ref 8.4–10.2)
CHLORIDE SERPL-SCNC: 102 MMOL/L (ref 95–110)
CO2 SERPL-SCNC: 28 MMOL/L (ref 22–31)
CREAT BLD-MCNC: 0.62 MG/DL (ref 0.5–1)
DEPRECATED RDW RBC AUTO: 43.1 FL (ref 36.4–46.3)
EOSINOPHIL # BLD AUTO: 0.07 10*3/MM3 (ref 0–0.7)
EOSINOPHIL NFR BLD AUTO: 1.3 % (ref 0–7)
ERYTHROCYTE [DISTWIDTH] IN BLOOD BY AUTOMATED COUNT: 12.9 % (ref 11.5–14.5)
GFR SERPL CREATININE-BSD FRML MDRD: 135 ML/MIN/1.73 (ref 64–149)
GLOBULIN UR ELPH-MCNC: 3.3 GM/DL (ref 2.3–3.5)
GLUCOSE BLD-MCNC: 83 MG/DL (ref 60–100)
HCT VFR BLD AUTO: 37.7 % (ref 35–45)
HGB BLD-MCNC: 13.1 G/DL (ref 12–15.5)
IMM GRANULOCYTES # BLD: 0.01 10*3/MM3 (ref 0–0.02)
IMM GRANULOCYTES NFR BLD: 0.2 % (ref 0–0.5)
LYMPHOCYTES # BLD AUTO: 2.12 10*3/MM3 (ref 0.6–4.2)
LYMPHOCYTES NFR BLD AUTO: 38.1 % (ref 10–50)
MAGNESIUM SERPL-MCNC: 2 MG/DL (ref 1.6–2.3)
MCH RBC QN AUTO: 31.6 PG (ref 26.5–34)
MCHC RBC AUTO-ENTMCNC: 34.7 G/DL (ref 31.4–36)
MCV RBC AUTO: 91.1 FL (ref 80–98)
MONOCYTES # BLD AUTO: 0.45 10*3/MM3 (ref 0–0.9)
MONOCYTES NFR BLD AUTO: 8.1 % (ref 0–12)
NEUTROPHILS # BLD AUTO: 2.89 10*3/MM3 (ref 2–8.6)
NEUTROPHILS NFR BLD AUTO: 51.9 % (ref 37–80)
NRBC BLD MANUAL-RTO: 0 /100 WBC (ref 0–0)
PLATELET # BLD AUTO: 270 10*3/MM3 (ref 150–450)
PMV BLD AUTO: 10.1 FL (ref 8–12)
POTASSIUM BLD-SCNC: 4.4 MMOL/L (ref 3.5–5.1)
PROT SERPL-MCNC: 7.7 G/DL (ref 6.3–8.6)
RBC # BLD AUTO: 4.14 10*6/MM3 (ref 3.77–5.16)
SODIUM BLD-SCNC: 138 MMOL/L (ref 137–145)
T4 FREE SERPL-MCNC: 1.02 NG/DL (ref 0.78–2.19)
TSH SERPL DL<=0.05 MIU/L-ACNC: 1.4 MIU/ML (ref 0.46–4.68)
VIT B12 BLD-MCNC: 690 PG/ML (ref 239–931)
WBC NRBC COR # BLD: 5.56 10*3/MM3 (ref 3.2–9.8)

## 2018-08-17 PROCEDURE — 36415 COLL VENOUS BLD VENIPUNCTURE: CPT | Performed by: FAMILY MEDICINE

## 2018-08-17 PROCEDURE — 85025 COMPLETE CBC W/AUTO DIFF WBC: CPT | Performed by: FAMILY MEDICINE

## 2018-08-17 PROCEDURE — 84443 ASSAY THYROID STIM HORMONE: CPT | Performed by: FAMILY MEDICINE

## 2018-08-17 PROCEDURE — 82607 VITAMIN B-12: CPT | Performed by: FAMILY MEDICINE

## 2018-08-17 PROCEDURE — 80053 COMPREHEN METABOLIC PANEL: CPT | Performed by: FAMILY MEDICINE

## 2018-08-17 PROCEDURE — 99214 OFFICE O/P EST MOD 30 MIN: CPT | Performed by: FAMILY MEDICINE

## 2018-08-17 PROCEDURE — 83735 ASSAY OF MAGNESIUM: CPT | Performed by: FAMILY MEDICINE

## 2018-08-17 PROCEDURE — 84439 ASSAY OF FREE THYROXINE: CPT | Performed by: FAMILY MEDICINE

## 2018-08-17 RX ORDER — FLUTICASONE PROPIONATE 50 MCG
2 SPRAY, SUSPENSION (ML) NASAL DAILY
Qty: 9.9 ML | Refills: 2 | Status: SHIPPED | OUTPATIENT
Start: 2018-08-17 | End: 2018-12-03

## 2018-08-17 RX ORDER — LORATADINE 10 MG/1
10 CAPSULE, LIQUID FILLED ORAL DAILY
COMMUNITY
End: 2018-10-03

## 2018-08-17 RX ORDER — DIPHENHYDRAMINE HCL 25 MG
25 CAPSULE ORAL AS NEEDED
COMMUNITY
End: 2018-10-03

## 2018-08-17 NOTE — TELEPHONE ENCOUNTER
Per Dr. Osorio, Ms. Vargas has been called with her recent lab results & recommendations.  Continue her current medications and follow-up as planned or sooner if any problems.      ----- Message from Ezequiel Aguilar MD sent at 8/17/2018  1:23 PM CDT -----  Ok, call or send card.

## 2018-08-17 NOTE — PROGRESS NOTES
Per Dr. Osorio, Ms. Vargas has been called with her recent lab results & recommendations.  Continue her current medications and follow-up as planned or sooner if any problems.

## 2018-08-17 NOTE — PROGRESS NOTES
Subjective   Ligia Vargas is a 33 y.o. female.     Fibromyalgia   This is a chronic problem. The current episode started more than 1 year ago. The problem occurs constantly. The problem has been unchanged. Associated symptoms include abdominal pain, nausea and a sore throat. Pertinent negatives include no chills or vomiting.   Anxiety   Presents for follow-up visit. Symptoms include insomnia, irritability, malaise, nausea and shortness of breath. Patient reports no depressed mood, excessive worry, hyperventilation, nervous/anxious behavior, palpitations or panic. Symptoms occur constantly.       Nausea   This is a chronic problem. The current episode started more than 1 month ago. The problem occurs constantly. Associated symptoms include abdominal pain, nausea and a sore throat. Pertinent negatives include no chills or vomiting.   Hypertension   This is a new problem. The current episode started more than 1 month ago. The problem has been gradually improving since onset. The problem is controlled. Associated symptoms include anxiety and shortness of breath. Pertinent negatives include no palpitations.   Sinusitis   This is a new problem. The current episode started 1 to 4 weeks ago. The problem has been gradually worsening since onset. There has been no fever. Associated symptoms include ear pain, shortness of breath, sinus pressure and a sore throat. Pertinent negatives include no chills or sneezing.        The following portions of the patient's history were reviewed and updated as appropriate: allergies, current medications, past family history, past medical history, past social history, past surgical history and problem list.    Review of Systems   Constitutional: Positive for irritability. Negative for chills.   HENT: Positive for ear pain, sinus pressure and sore throat. Negative for sneezing.    Respiratory: Positive for shortness of breath.    Cardiovascular: Negative for palpitations.    Gastrointestinal: Positive for abdominal pain and nausea. Negative for vomiting.   Genitourinary: Positive for frequency. Menstrual problem: late on period.   Psychiatric/Behavioral: The patient has insomnia. The patient is not nervous/anxious.        Objective   Physical Exam   Constitutional: She is oriented to person, place, and time. She appears well-developed and well-nourished. No distress.   HENT:   Head: Normocephalic and atraumatic.   Right Ear: Hearing normal.   Left Ear: Hearing normal.   Nose: Mucosal edema and rhinorrhea present.   Mouth/Throat: Uvula is midline, oropharynx is clear and moist and mucous membranes are normal.   Cardiovascular: Normal rate, regular rhythm and normal heart sounds.  Exam reveals no gallop and no friction rub.    Pulmonary/Chest: No respiratory distress. She has no wheezes. She has no rales. She exhibits no tenderness.   Neurological: She is alert and oriented to person, place, and time.   Skin: Skin is warm and dry. She is not diaphoretic.   Psychiatric: She has a normal mood and affect. Her speech is normal and behavior is normal. Judgment and thought content normal. Her mood appears not anxious. Her affect is not angry, not blunt, not labile and not inappropriate. Cognition and memory are normal. She does not exhibit a depressed mood.   Nursing note and vitals reviewed.      Assessment/Plan   Problems Addressed this Visit        Cardiovascular and Mediastinum    Postpartum hypertension       Nervous and Auditory    Generalized abdominal pain       Other    Weight loss, unintentional - Primary    Relevant Orders    Comprehensive Metabolic Panel    CBC & Differential    Vitamin B12    T4, Free    TSH      Other Visit Diagnoses     High risk medication use        Relevant Orders    Vitamin B12    Magnesium    Acute non-seasonal allergic rhinitis, unspecified trigger

## 2018-11-19 ENCOUNTER — TELEPHONE (OUTPATIENT)
Dept: FAMILY MEDICINE CLINIC | Facility: CLINIC | Age: 33
End: 2018-11-19

## 2018-11-19 NOTE — TELEPHONE ENCOUNTER
Went to [facility] in Oct and once in Nov for upper respiratory and her right tonsil was swollen both times.  She was given Amoxicillin the second time she went.  She wants to know if she needs to be seen, it is still swollen?  Please advise.

## 2018-11-29 ENCOUNTER — OFFICE VISIT (OUTPATIENT)
Dept: FAMILY MEDICINE CLINIC | Facility: CLINIC | Age: 33
End: 2018-11-29

## 2018-11-29 VITALS
HEIGHT: 66 IN | DIASTOLIC BLOOD PRESSURE: 90 MMHG | WEIGHT: 115 LBS | BODY MASS INDEX: 18.48 KG/M2 | SYSTOLIC BLOOD PRESSURE: 120 MMHG | TEMPERATURE: 97.8 F

## 2018-11-29 DIAGNOSIS — J02.9 SORE THROAT: Primary | ICD-10-CM

## 2018-11-29 PROCEDURE — 96372 THER/PROPH/DIAG INJ SC/IM: CPT | Performed by: NURSE PRACTITIONER

## 2018-11-29 PROCEDURE — 99213 OFFICE O/P EST LOW 20 MIN: CPT | Performed by: NURSE PRACTITIONER

## 2018-11-29 RX ORDER — TRIAMCINOLONE ACETONIDE 40 MG/ML
40 INJECTION, SUSPENSION INTRA-ARTICULAR; INTRAMUSCULAR ONCE
Status: COMPLETED | OUTPATIENT
Start: 2018-11-29 | End: 2018-11-29

## 2018-11-29 RX ORDER — CLARITHROMYCIN 500 MG/1
500 TABLET, COATED ORAL 2 TIMES DAILY
Qty: 14 TABLET | Refills: 0 | Status: SHIPPED | OUTPATIENT
Start: 2018-11-29 | End: 2018-12-29

## 2018-11-29 RX ADMIN — TRIAMCINOLONE ACETONIDE 40 MG: 40 INJECTION, SUSPENSION INTRA-ARTICULAR; INTRAMUSCULAR at 16:15

## 2018-11-29 NOTE — PROGRESS NOTES
Subjective   Ligia Vargas is a 33 y.o. female.     URI    This is a new problem. The current episode started in the past 7 days. The problem has been unchanged. There has been no fever. Associated symptoms include congestion, a plugged ear sensation, sinus pain, sneezing and a sore throat. Pertinent negatives include no chest pain or swollen glands.   Sore Throat    Associated symptoms include congestion, a plugged ear sensation and trouble swallowing. Pertinent negatives include no swollen glands.        sore throat 2 antibiotics     Review of Systems   Constitutional: Negative.    HENT: Positive for congestion, postnasal drip, sinus pressure, sneezing, sore throat and trouble swallowing. Negative for dental problem and tinnitus.    Eyes: Negative.    Respiratory: Negative.    Cardiovascular: Negative.  Negative for chest pain, palpitations and leg swelling.   Gastrointestinal: Negative.    Endocrine: Negative.    Genitourinary: Negative.    Musculoskeletal: Negative.    Skin: Negative.    Allergic/Immunologic: Negative.    Neurological: Negative.    Hematological: Negative.    Psychiatric/Behavioral: Negative.        Objective   Physical Exam   Constitutional: She appears well-developed.   HENT:   Head: Normocephalic.   Mouth/Throat: Mucous membranes are not pale, not dry and not cyanotic. Uvula swelling present. Oropharyngeal exudate and posterior oropharyngeal edema present. No posterior oropharyngeal erythema or tonsillar abscesses.       Eyes: Pupils are equal, round, and reactive to light.   Neck: Normal range of motion.   Cardiovascular: Normal rate.   Pulmonary/Chest: Effort normal.   Abdominal: Soft.   Musculoskeletal: Normal range of motion.   Neurological: She is alert.   Skin: Skin is warm.   Nursing note and vitals reviewed.        Assessment/Plan   Ligia was seen today for swollen tonsils.    Diagnoses and all orders for this visit:    Sore throat  -     triamcinolone acetonide (KENALOG-40)  injection 40 mg; Inject 1 mL into the appropriate muscle as directed by prescriber 1 (One) Time.    Other orders  -     clarithromycin (BIAXIN) 500 MG tablet; Take 1 tablet by mouth 2 (Two) Times a Day.          It's not just what you eat, but when you eat  Eat breakfast, and eat smaller meals throughout the day. A healthy breakfast can jumpstart your metabolism, while eating small, healthy meals (rather than the standard three large meals) keeps your energy up.   Avoid eating at night. Try to eat dinner earlier and fast for 14-16 hours until breakfast the next morning. Studies suggest that eating only when you’re most active and giving your digestive system a long break each day may help to regulate weight.

## 2018-11-29 NOTE — PROGRESS NOTES
"  Chief Complaint   Patient presents with   • swollen tonsils     Subjective   Ligia Vargas is a 33 y.o. female.     Sore Throat    This is a new problem. The current episode started 1 to 4 weeks ago. The problem has been gradually worsening. There has been no fever. The pain is at a severity of 3/10. The pain is moderate. Associated symptoms include congestion and a hoarse voice. Pertinent negatives include no abdominal pain, coughing, diarrhea, drooling, ear discharge, ear pain, headaches, plugged ear sensation, neck pain, shortness of breath, stridor, swollen glands, trouble swallowing or vomiting. She has had no exposure to strep. She has tried acetaminophen, gargles, cool liquids and NSAIDs for the symptoms. The treatment provided mild relief.        The following portions of the patient's history were reviewed and updated as appropriate: allergies, current medications, past social history and problem list.    Review of Systems   Constitutional: Negative.    HENT: Positive for congestion, hoarse voice and sore throat. Negative for drooling, ear discharge, ear pain and trouble swallowing.    Eyes: Negative.    Respiratory: Negative for cough, shortness of breath and stridor.    Gastrointestinal: Negative for abdominal pain, diarrhea and vomiting.   Endocrine: Negative.    Musculoskeletal: Negative for neck pain.   Neurological: Negative for headaches.       Objective   /90   Temp 97.8 °F (36.6 °C) (Tympanic)   Ht 167.6 cm (66\")   Wt 52.2 kg (115 lb)   BMI 18.56 kg/m²   Physical Exam    Assessment/Plan   Problem List Items Addressed This Visit     None           New Medications Ordered This Visit   Medications   • clarithromycin (BIAXIN) 500 MG tablet     Sig: Take 1 tablet by mouth 2 (Two) Times a Day.     Dispense:  14 tablet     Refill:  0        "

## 2018-12-03 PROBLEM — M79.18 LEFT BUTTOCK PAIN: Status: ACTIVE | Noted: 2018-12-03

## 2018-12-04 ENCOUNTER — HOSPITAL ENCOUNTER (OUTPATIENT)
Dept: ULTRASOUND IMAGING | Facility: HOSPITAL | Age: 33
Discharge: HOME | End: 2018-12-04
Admitting: NURSE PRACTITIONER

## 2018-12-04 DIAGNOSIS — M79.18 LEFT BUTTOCK PAIN: ICD-10-CM

## 2018-12-04 PROCEDURE — 76999 ECHO EXAMINATION PROCEDURE: CPT

## 2018-12-05 ENCOUNTER — TELEPHONE (OUTPATIENT)
Dept: FAMILY MEDICINE CLINIC | Facility: CLINIC | Age: 33
End: 2018-12-05

## 2018-12-05 ENCOUNTER — OFFICE VISIT (OUTPATIENT)
Dept: FAMILY MEDICINE CLINIC | Facility: CLINIC | Age: 33
End: 2018-12-05

## 2018-12-05 ENCOUNTER — LAB (OUTPATIENT)
Dept: LAB | Facility: HOSPITAL | Age: 33
End: 2018-12-05

## 2018-12-05 VITALS
SYSTOLIC BLOOD PRESSURE: 130 MMHG | WEIGHT: 112 LBS | HEIGHT: 66 IN | OXYGEN SATURATION: 100 % | BODY MASS INDEX: 18 KG/M2 | DIASTOLIC BLOOD PRESSURE: 80 MMHG | HEART RATE: 84 BPM

## 2018-12-05 DIAGNOSIS — Z23 NEED FOR INFLUENZA VACCINATION: ICD-10-CM

## 2018-12-05 DIAGNOSIS — G44.52 NEW DAILY PERSISTENT HEADACHE: ICD-10-CM

## 2018-12-05 DIAGNOSIS — G44.52 NEW DAILY PERSISTENT HEADACHE: Primary | ICD-10-CM

## 2018-12-05 LAB
ALBUMIN SERPL-MCNC: 4.4 G/DL (ref 3.4–4.8)
ALBUMIN/GLOB SERPL: 1.3 G/DL (ref 1.1–1.8)
ALP SERPL-CCNC: 78 U/L (ref 38–126)
ALT SERPL W P-5'-P-CCNC: 17 U/L (ref 9–52)
ANION GAP SERPL CALCULATED.3IONS-SCNC: 8 MMOL/L (ref 5–15)
AST SERPL-CCNC: 45 U/L (ref 14–36)
BASOPHILS # BLD AUTO: 0.02 10*3/MM3 (ref 0–0.2)
BASOPHILS NFR BLD AUTO: 0.2 % (ref 0–2)
BILIRUB SERPL-MCNC: 0.5 MG/DL (ref 0.2–1.3)
BUN BLD-MCNC: 13 MG/DL (ref 7–21)
BUN/CREAT SERPL: 21 (ref 7–25)
CALCIUM SPEC-SCNC: 9.1 MG/DL (ref 8.4–10.2)
CHLORIDE SERPL-SCNC: 100 MMOL/L (ref 95–110)
CO2 SERPL-SCNC: 29 MMOL/L (ref 22–31)
CREAT BLD-MCNC: 0.62 MG/DL (ref 0.5–1)
DEPRECATED RDW RBC AUTO: 40.9 FL (ref 36.4–46.3)
EOSINOPHIL # BLD AUTO: 0.03 10*3/MM3 (ref 0–0.7)
EOSINOPHIL NFR BLD AUTO: 0.3 % (ref 0–7)
ERYTHROCYTE [DISTWIDTH] IN BLOOD BY AUTOMATED COUNT: 12 % (ref 11.5–14.5)
ERYTHROCYTE [SEDIMENTATION RATE] IN BLOOD: 10 MM/HR (ref 0–20)
GFR SERPL CREATININE-BSD FRML MDRD: 135 ML/MIN/1.73 (ref 64–149)
GLOBULIN UR ELPH-MCNC: 3.4 GM/DL (ref 2.3–3.5)
GLUCOSE BLD-MCNC: 90 MG/DL (ref 60–100)
HCT VFR BLD AUTO: 34.6 % (ref 35–45)
HGB BLD-MCNC: 12.4 G/DL (ref 12–15.5)
IMM GRANULOCYTES # BLD: 0.02 10*3/MM3 (ref 0–0.02)
IMM GRANULOCYTES NFR BLD: 0.2 % (ref 0–0.5)
LYMPHOCYTES # BLD AUTO: 2.86 10*3/MM3 (ref 0.6–4.2)
LYMPHOCYTES NFR BLD AUTO: 27.3 % (ref 10–50)
MCH RBC QN AUTO: 33.2 PG (ref 26.5–34)
MCHC RBC AUTO-ENTMCNC: 35.8 G/DL (ref 31.4–36)
MCV RBC AUTO: 92.5 FL (ref 80–98)
MONOCYTES # BLD AUTO: 0.6 10*3/MM3 (ref 0–0.9)
MONOCYTES NFR BLD AUTO: 5.7 % (ref 0–12)
NEUTROPHILS # BLD AUTO: 6.94 10*3/MM3 (ref 2–8.6)
NEUTROPHILS NFR BLD AUTO: 66.3 % (ref 37–80)
NRBC BLD MANUAL-RTO: 0 /100 WBC (ref 0–0)
PLATELET # BLD AUTO: 304 10*3/MM3 (ref 150–450)
PMV BLD AUTO: 10.4 FL (ref 8–12)
POTASSIUM BLD-SCNC: 3.9 MMOL/L (ref 3.5–5.1)
PROT SERPL-MCNC: 7.8 G/DL (ref 6.3–8.6)
RBC # BLD AUTO: 3.74 10*6/MM3 (ref 3.77–5.16)
SODIUM BLD-SCNC: 137 MMOL/L (ref 137–145)
VIT B12 BLD-MCNC: 532 PG/ML (ref 239–931)
WBC NRBC COR # BLD: 10.47 10*3/MM3 (ref 3.2–9.8)

## 2018-12-05 PROCEDURE — 90674 CCIIV4 VAC NO PRSV 0.5 ML IM: CPT | Performed by: FAMILY MEDICINE

## 2018-12-05 PROCEDURE — 85651 RBC SED RATE NONAUTOMATED: CPT

## 2018-12-05 PROCEDURE — 85025 COMPLETE CBC W/AUTO DIFF WBC: CPT | Performed by: FAMILY MEDICINE

## 2018-12-05 PROCEDURE — 86757 RICKETTSIA ANTIBODY: CPT | Performed by: FAMILY MEDICINE

## 2018-12-05 PROCEDURE — 36415 COLL VENOUS BLD VENIPUNCTURE: CPT | Performed by: FAMILY MEDICINE

## 2018-12-05 PROCEDURE — 99214 OFFICE O/P EST MOD 30 MIN: CPT | Performed by: FAMILY MEDICINE

## 2018-12-05 PROCEDURE — 90471 IMMUNIZATION ADMIN: CPT | Performed by: FAMILY MEDICINE

## 2018-12-05 PROCEDURE — 82607 VITAMIN B-12: CPT | Performed by: FAMILY MEDICINE

## 2018-12-05 PROCEDURE — 86618 LYME DISEASE ANTIBODY: CPT | Performed by: FAMILY MEDICINE

## 2018-12-05 PROCEDURE — 80053 COMPREHEN METABOLIC PANEL: CPT | Performed by: FAMILY MEDICINE

## 2018-12-05 NOTE — PROGRESS NOTES
Subjective   Ligia Vargas is a 33 y.o. female.     Neurologic Problem   The patient's primary symptoms include focal weakness (left arm) and weakness. The patient's pertinent negatives include no altered mental status, clumsiness, loss of balance, near-syncope, slurred speech, syncope or visual change. This is a new problem. The current episode started 1 to 4 weeks ago. The neurological problem developed suddenly. The problem is unchanged. There was left-sided focality noted. Associated symptoms include headaches. Pertinent negatives include no auditory change, aura, bladder incontinence, bowel incontinence, confusion, dizziness, fever, light-headedness, nausea, neck pain, palpitations, shortness of breath or vomiting. Past treatments include nothing. The treatment provided no relief.   Headache    This is a new problem. The current episode started more than 1 month ago. The problem occurs intermittently. The problem has been waxing and waning. The pain is located in the temporal region. The pain does not radiate. The pain quality is not similar to prior headaches. The quality of the pain is described as throbbing. The pain is at a severity of 9/10. The pain is severe. Associated symptoms include abnormal behavior, numbness (left leg), scalp tenderness, a sore throat and weakness. Pertinent negatives include no anorexia, blurred vision, dizziness, ear pain, eye pain, fever, hearing loss, loss of balance, muscle aches, nausea, neck pain, phonophobia, photophobia, rhinorrhea, seizures, sinus pressure, tingling, visual change, vomiting or weight loss.   Lower Extremity Issue   This is a new problem. The current episode started 1 to 4 weeks ago. The problem occurs constantly. The problem has been unchanged. Associated symptoms include headaches, numbness (left leg), a sore throat and weakness. Pertinent negatives include no anorexia, fever, nausea, neck pain, visual change or vomiting.        The following  portions of the patient's history were reviewed and updated as appropriate: allergies, current medications, past family history, past medical history, past social history, past surgical history and problem list.    Review of Systems   Constitutional: Negative for fever and weight loss.   HENT: Positive for sore throat. Negative for ear pain, hearing loss, rhinorrhea and sinus pressure.    Eyes: Negative for blurred vision, photophobia and pain.   Respiratory: Negative for shortness of breath.    Cardiovascular: Negative for palpitations and near-syncope.   Gastrointestinal: Negative for anorexia, bowel incontinence, nausea and vomiting.   Genitourinary: Negative for bladder incontinence.   Musculoskeletal: Negative for neck pain.   Neurological: Positive for focal weakness (left arm), weakness, numbness (left leg) and headaches. Negative for dizziness, tingling, seizures, syncope, light-headedness and loss of balance.   Psychiatric/Behavioral: Negative for confusion.       Objective   Physical Exam   Constitutional: She is oriented to person, place, and time. She appears well-developed and well-nourished. No distress.   HENT:   Head: Normocephalic and atraumatic.       Musculoskeletal:        Left upper leg: She exhibits no tenderness, no bony tenderness, no swelling, no edema, no deformity and no laceration.   Neurological: She is alert and oriented to person, place, and time. No cranial nerve deficit or sensory deficit. Coordination and gait normal.   Motor 4/5 left upper ext 5/5 other extremities   Skin: Skin is warm and dry. She is not diaphoretic.   Psychiatric: She has a normal mood and affect. Her speech is normal and behavior is normal. Judgment and thought content normal.   Nursing note and vitals reviewed.      Assessment/Plan   Problems Addressed this Visit        Nervous and Auditory    Headache - Primary    Relevant Orders    Ambulatory Referral to Neurology    MRI Brain Without Contrast     Sedimentation Rate    CBC & Differential    Vitamin B12    [facility] (IgG / M)    Lyme, Total Antibody Test / Reflex    Comprehensive Metabolic Panel    CBC Auto Differential    MRI Brain With & Without Contrast      Other Visit Diagnoses     Need for influenza vaccination        Relevant Medications    Influenza Vac Subunit Quad (FLUCELVAX) injection 0.5 mL (Completed)

## 2018-12-07 ENCOUNTER — TELEPHONE (OUTPATIENT)
Dept: FAMILY MEDICINE CLINIC | Facility: CLINIC | Age: 33
End: 2018-12-07

## 2018-12-07 DIAGNOSIS — R74.01 ELEVATED AST (SGOT): Primary | ICD-10-CM

## 2018-12-07 LAB
B BURGDOR IGG+IGM SER-ACNC: <0.91 ISR (ref 0–0.9)
R RICKETTSI IGG SER QL IA: NEGATIVE
R RICKETTSI IGM TITR SER: 0.25 INDEX (ref 0–0.89)

## 2018-12-07 NOTE — TELEPHONE ENCOUNTER
Pr Dr. Aguilar, Ms. Vargas has been called with her recent lab results & recommendations.  Continue her current medications and follow-up as planned or sooner if any problems.    US and Labs have been ordered      ----- Message from Ezequiel Aguilar MD sent at 12/6/2018  8:45 AM CST -----  AST is elevated please get a ferritin, acute hepatitis panel, and ultrasound liver.

## 2018-12-07 NOTE — TELEPHONE ENCOUNTER
Pr Dr. Aguilar, Ms. Vargas has been called with her recent Lyme's results & recommendations.  Continue her current medications and follow-up as planned or sooner if any problems.      ----- Message from Ezequiel Aguilar MD sent at 12/7/2018  2:18 PM CST -----  Ok, call or send card.

## 2018-12-07 NOTE — PROGRESS NOTES
Pr Dr. Aguilar, Ms. Vargas has been called with her recent lab results & recommendations.  Continue her current medications and follow-up as planned or sooner if any problems.    US and Labs have been ordered

## 2018-12-07 NOTE — PROGRESS NOTES
Pr Dr. Aguilar, Ms. Sam has been called with her recent Lyme's results & recommendations.  Continue her current medications and follow-up as planned or sooner if any problems.

## 2018-12-10 ENCOUNTER — TELEPHONE (OUTPATIENT)
Dept: FAMILY MEDICINE CLINIC | Facility: CLINIC | Age: 33
End: 2018-12-10

## 2018-12-10 NOTE — TELEPHONE ENCOUNTER
-Pr Dr. Aguilar, Ms. Vargas has been called with her recent lab results & recommendations.  Continue her current medications and follow-up as planned or sooner if any problems.      ---- Message from Ezequiel Aguilar MD sent at 12/10/2018  8:33 AM CST -----  Ok, call or send card.

## 2018-12-10 NOTE — PROGRESS NOTES
Pr Dr. Aguilar, Ms. Vargas has been called with her recent lab results & recommendations.  Continue her current medications and follow-up as planned or sooner if any problems.

## 2018-12-18 ENCOUNTER — APPOINTMENT (OUTPATIENT)
Dept: ULTRASOUND IMAGING | Facility: HOSPITAL | Age: 33
End: 2018-12-18

## 2018-12-26 ENCOUNTER — APPOINTMENT (OUTPATIENT)
Dept: MRI IMAGING | Facility: HOSPITAL | Age: 33
End: 2018-12-26
Attending: FAMILY MEDICINE

## 2018-12-29 ENCOUNTER — APPOINTMENT (OUTPATIENT)
Dept: ULTRASOUND IMAGING | Facility: HOSPITAL | Age: 33
End: 2018-12-29

## 2018-12-29 ENCOUNTER — HOSPITAL ENCOUNTER (EMERGENCY)
Facility: HOSPITAL | Age: 33
Discharge: HOME | End: 2018-12-29
Attending: EMERGENCY MEDICINE | Admitting: EMERGENCY MEDICINE

## 2018-12-29 VITALS
SYSTOLIC BLOOD PRESSURE: 135 MMHG | DIASTOLIC BLOOD PRESSURE: 91 MMHG | HEART RATE: 60 BPM | TEMPERATURE: 98 F | RESPIRATION RATE: 18 BRPM | WEIGHT: 111.2 LBS | HEIGHT: 66 IN | BODY MASS INDEX: 17.87 KG/M2 | OXYGEN SATURATION: 100 %

## 2018-12-29 DIAGNOSIS — N93.9 VAGINAL SPOTTING: ICD-10-CM

## 2018-12-29 DIAGNOSIS — B37.49 CANDIDA INFECTION OF GENITAL REGION: Primary | ICD-10-CM

## 2018-12-29 LAB
ALBUMIN SERPL-MCNC: 4.1 G/DL (ref 3.4–4.8)
ALBUMIN/GLOB SERPL: 1.1 G/DL (ref 1.1–1.8)
ALP SERPL-CCNC: 87 U/L (ref 38–126)
ALT SERPL W P-5'-P-CCNC: 12 U/L (ref 9–52)
ANION GAP SERPL CALCULATED.3IONS-SCNC: 10 MMOL/L (ref 5–15)
AST SERPL-CCNC: 20 U/L (ref 14–36)
B-HCG UR QL: NEGATIVE
BACTERIA UR QL AUTO: ABNORMAL /HPF
BASOPHILS # BLD AUTO: 0.01 10*3/MM3 (ref 0–0.2)
BASOPHILS NFR BLD AUTO: 0.1 % (ref 0–2)
BILIRUB SERPL-MCNC: 0.3 MG/DL (ref 0.2–1.3)
BILIRUB UR QL STRIP: NEGATIVE
BUN BLD-MCNC: 13 MG/DL (ref 7–21)
BUN/CREAT SERPL: 17.3 (ref 7–25)
CALCIUM SPEC-SCNC: 9 MG/DL (ref 8.4–10.2)
CANDIDA ALBICANS: POSITIVE
CHLORIDE SERPL-SCNC: 100 MMOL/L (ref 95–110)
CLARITY UR: CLEAR
CO2 SERPL-SCNC: 25 MMOL/L (ref 22–31)
COLOR UR: YELLOW
CREAT BLD-MCNC: 0.75 MG/DL (ref 0.5–1)
DEPRECATED RDW RBC AUTO: 42.3 FL (ref 36.4–46.3)
EOSINOPHIL # BLD AUTO: 0.06 10*3/MM3 (ref 0–0.7)
EOSINOPHIL NFR BLD AUTO: 0.8 % (ref 0–7)
ERYTHROCYTE [DISTWIDTH] IN BLOOD BY AUTOMATED COUNT: 12.6 % (ref 11.5–14.5)
GARDNERELLA VAGINALIS: NEGATIVE
GFR SERPL CREATININE-BSD FRML MDRD: 109 ML/MIN/1.73 (ref 64–149)
GLOBULIN UR ELPH-MCNC: 3.6 GM/DL (ref 2.3–3.5)
GLUCOSE BLD-MCNC: 93 MG/DL (ref 60–100)
GLUCOSE UR STRIP-MCNC: NEGATIVE MG/DL
HCT VFR BLD AUTO: 39.1 % (ref 35–45)
HGB BLD-MCNC: 13.9 G/DL (ref 12–15.5)
HGB UR QL STRIP.AUTO: ABNORMAL
HOLD SPECIMEN: NORMAL
HOLD SPECIMEN: NORMAL
HYALINE CASTS UR QL AUTO: ABNORMAL /LPF
IMM GRANULOCYTES # BLD AUTO: 0.01 10*3/MM3 (ref 0–0.02)
IMM GRANULOCYTES NFR BLD AUTO: 0.1 % (ref 0–0.5)
INR PPP: 1.18 (ref 0.8–1.2)
KETONES UR QL STRIP: NEGATIVE
LEUKOCYTE ESTERASE UR QL STRIP.AUTO: NEGATIVE
LYMPHOCYTES # BLD AUTO: 2.21 10*3/MM3 (ref 0.6–4.2)
LYMPHOCYTES NFR BLD AUTO: 27.8 % (ref 10–50)
MCH RBC QN AUTO: 32.6 PG (ref 26.5–34)
MCHC RBC AUTO-ENTMCNC: 35.5 G/DL (ref 31.4–36)
MCV RBC AUTO: 91.6 FL (ref 80–98)
MONOCYTES # BLD AUTO: 0.88 10*3/MM3 (ref 0–0.9)
MONOCYTES NFR BLD AUTO: 11.1 % (ref 0–12)
NEUTROPHILS # BLD AUTO: 4.77 10*3/MM3 (ref 2–8.6)
NEUTROPHILS NFR BLD AUTO: 60.1 % (ref 37–80)
NITRITE UR QL STRIP: NEGATIVE
PH UR STRIP.AUTO: 6 [PH] (ref 5–9)
PLATELET # BLD AUTO: 196 10*3/MM3 (ref 150–450)
PMV BLD AUTO: 9.5 FL (ref 8–12)
POTASSIUM BLD-SCNC: 4.3 MMOL/L (ref 3.5–5.1)
PROT SERPL-MCNC: 7.7 G/DL (ref 6.3–8.6)
PROT UR QL STRIP: NEGATIVE
PROTHROMBIN TIME: 14.7 SECONDS (ref 11.1–15.3)
RBC # BLD AUTO: 4.27 10*6/MM3 (ref 3.77–5.16)
RBC # UR: ABNORMAL /HPF
REF LAB TEST METHOD: ABNORMAL
SODIUM BLD-SCNC: 135 MMOL/L (ref 137–145)
SP GR UR STRIP: 1.01 (ref 1–1.03)
SQUAMOUS #/AREA URNS HPF: ABNORMAL /HPF
TRICHOMONAS VAGINALIS PCR: NEGATIVE
UROBILINOGEN UR QL STRIP: ABNORMAL
WBC NRBC COR # BLD: 7.94 10*3/MM3 (ref 3.2–9.8)
WBC UR QL AUTO: ABNORMAL /HPF
WHOLE BLOOD HOLD SPECIMEN: NORMAL
WHOLE BLOOD HOLD SPECIMEN: NORMAL

## 2018-12-29 PROCEDURE — 85025 COMPLETE CBC W/AUTO DIFF WBC: CPT | Performed by: PHYSICIAN ASSISTANT

## 2018-12-29 PROCEDURE — 85610 PROTHROMBIN TIME: CPT | Performed by: PHYSICIAN ASSISTANT

## 2018-12-29 PROCEDURE — 80053 COMPREHEN METABOLIC PANEL: CPT | Performed by: PHYSICIAN ASSISTANT

## 2018-12-29 PROCEDURE — 76830 TRANSVAGINAL US NON-OB: CPT

## 2018-12-29 PROCEDURE — 87591 N.GONORRHOEAE DNA AMP PROB: CPT | Performed by: PHYSICIAN ASSISTANT

## 2018-12-29 PROCEDURE — 87660 TRICHOMONAS VAGIN DIR PROBE: CPT | Performed by: PHYSICIAN ASSISTANT

## 2018-12-29 PROCEDURE — 81001 URINALYSIS AUTO W/SCOPE: CPT | Performed by: PHYSICIAN ASSISTANT

## 2018-12-29 PROCEDURE — 87491 CHLMYD TRACH DNA AMP PROBE: CPT | Performed by: PHYSICIAN ASSISTANT

## 2018-12-29 PROCEDURE — 87661 TRICHOMONAS VAGINALIS AMPLIF: CPT | Performed by: PHYSICIAN ASSISTANT

## 2018-12-29 PROCEDURE — 81025 URINE PREGNANCY TEST: CPT | Performed by: PHYSICIAN ASSISTANT

## 2018-12-29 PROCEDURE — 99285 EMERGENCY DEPT VISIT HI MDM: CPT

## 2018-12-29 PROCEDURE — 87480 CANDIDA DNA DIR PROBE: CPT | Performed by: PHYSICIAN ASSISTANT

## 2018-12-29 PROCEDURE — 87510 GARDNER VAG DNA DIR PROBE: CPT | Performed by: PHYSICIAN ASSISTANT

## 2018-12-29 RX ORDER — CLONIDINE HYDROCHLORIDE 0.1 MG/1
0.1 TABLET ORAL ONCE
Status: COMPLETED | OUTPATIENT
Start: 2018-12-29 | End: 2018-12-29

## 2018-12-29 RX ORDER — ACETAMINOPHEN 500 MG
1000 TABLET ORAL ONCE
Status: COMPLETED | OUTPATIENT
Start: 2018-12-29 | End: 2018-12-29

## 2018-12-29 RX ORDER — FLUCONAZOLE 150 MG/1
150 TABLET ORAL ONCE
Status: COMPLETED | OUTPATIENT
Start: 2018-12-29 | End: 2018-12-29

## 2018-12-29 RX ADMIN — ACETAMINOPHEN 1000 MG: 500 TABLET ORAL at 19:22

## 2018-12-29 RX ADMIN — CLONIDINE HYDROCHLORIDE 0.1 MG: 0.1 TABLET ORAL at 19:23

## 2018-12-29 RX ADMIN — FLUCONAZOLE 150 MG: 150 TABLET ORAL at 20:08

## 2018-12-30 LAB
C TRACH RRNA CVX QL NAA+PROBE: NEGATIVE
N GONORRHOEA RRNA SPEC QL NAA+PROBE: NEGATIVE
TRICHOMONAS VAGINALIS PCR: NEGATIVE

## 2019-01-04 ENCOUNTER — APPOINTMENT (OUTPATIENT)
Dept: ULTRASOUND IMAGING | Facility: HOSPITAL | Age: 34
End: 2019-01-04

## 2019-01-11 ENCOUNTER — APPOINTMENT (OUTPATIENT)
Dept: MRI IMAGING | Facility: HOSPITAL | Age: 34
End: 2019-01-11
Attending: FAMILY MEDICINE

## 2019-01-11 ENCOUNTER — APPOINTMENT (OUTPATIENT)
Dept: ULTRASOUND IMAGING | Facility: HOSPITAL | Age: 34
End: 2019-01-11

## 2019-01-18 ENCOUNTER — HOSPITAL ENCOUNTER (OUTPATIENT)
Dept: MRI IMAGING | Facility: HOSPITAL | Age: 34
Discharge: HOME | End: 2019-01-18
Attending: FAMILY MEDICINE

## 2019-01-18 ENCOUNTER — HOSPITAL ENCOUNTER (OUTPATIENT)
Dept: ULTRASOUND IMAGING | Facility: HOSPITAL | Age: 34
Discharge: HOME | End: 2019-01-18
Admitting: FAMILY MEDICINE

## 2019-01-18 DIAGNOSIS — R74.01 ELEVATED AST (SGOT): ICD-10-CM

## 2019-01-18 PROCEDURE — 70553 MRI BRAIN STEM W/O & W/DYE: CPT

## 2019-01-18 PROCEDURE — 25010000002 GADOTERIDOL PER 1 ML: Performed by: FAMILY MEDICINE

## 2019-01-18 PROCEDURE — 76705 ECHO EXAM OF ABDOMEN: CPT

## 2019-01-18 PROCEDURE — A9576 INJ PROHANCE MULTIPACK: HCPCS | Performed by: FAMILY MEDICINE

## 2019-01-18 RX ADMIN — GADOTERIDOL 12 ML: 279.3 INJECTION, SOLUTION INTRAVENOUS at 11:30

## 2019-01-18 NOTE — PROGRESS NOTES
The liver looked okay.  There was a primary reason for us doing this.  There was some echogenic right kidney possibly due to renal parenchymal disease.  However her renal function on last blood test was within normal limits.  I do not feel any further workup is needed.

## 2019-01-21 ENCOUNTER — TELEPHONE (OUTPATIENT)
Dept: FAMILY MEDICINE CLINIC | Facility: CLINIC | Age: 34
End: 2019-01-21

## 2019-01-21 NOTE — PROGRESS NOTES
Pr Dr. Aguilar, Ms. Vargas has been called with her recent Liver US and MRI of the Brain results & recommendations.  Continue her current medications and follow-up as planned or sooner if any problems.

## 2019-01-21 NOTE — TELEPHONE ENCOUNTER
Pr Dr. Aguilar, Ms. Vargas has been called with her recent Liver US and MRI of the Brain results & recommendations.  Continue her current medications and follow-up as planned or sooner if any problems.      ----- Message from Ezequiel Aguilar MD sent at 1/18/2019  4:04 PM CST -----  The liver looked okay.  There was a primary reason for us doing this.  There was some echogenic right kidney possibly due to renal parenchymal disease.  However her renal function on last blood test was within normal limits.  I do not feel any further workup is needed.

## 2019-01-21 NOTE — TELEPHONE ENCOUNTER
-Pr Dr. Aguilar, Ms. Vargas has been called with her recent Liver US and MRI of the Brain results & recommendations.  Continue her current medications and follow-up as planned or sooner if any problems.      ---- Message from Ezequiel Aguilar MD sent at 1/18/2019  4:02 PM CST -----  Normal by radiology report

## 2019-02-05 ENCOUNTER — OFFICE VISIT (OUTPATIENT)
Dept: FAMILY MEDICINE CLINIC | Facility: CLINIC | Age: 34
End: 2019-02-05

## 2019-02-05 VITALS
BODY MASS INDEX: 18.32 KG/M2 | HEIGHT: 66 IN | OXYGEN SATURATION: 99 % | SYSTOLIC BLOOD PRESSURE: 130 MMHG | WEIGHT: 114 LBS | HEART RATE: 71 BPM | DIASTOLIC BLOOD PRESSURE: 80 MMHG

## 2019-02-05 DIAGNOSIS — F32.89 OTHER DEPRESSION: ICD-10-CM

## 2019-02-05 DIAGNOSIS — M79.7 PRIMARY FIBROMYALGIA: Primary | ICD-10-CM

## 2019-02-05 DIAGNOSIS — J01.00 ACUTE NON-RECURRENT MAXILLARY SINUSITIS: ICD-10-CM

## 2019-02-05 DIAGNOSIS — G44.219 EPISODIC TENSION-TYPE HEADACHE, NOT INTRACTABLE: ICD-10-CM

## 2019-02-05 PROCEDURE — 99214 OFFICE O/P EST MOD 30 MIN: CPT | Performed by: FAMILY MEDICINE

## 2019-02-05 RX ORDER — DULOXETIN HYDROCHLORIDE 60 MG/1
60 CAPSULE, DELAYED RELEASE ORAL DAILY
Qty: 30 CAPSULE | Refills: 11 | Status: SHIPPED | OUTPATIENT
Start: 2019-02-05 | End: 2019-02-20 | Stop reason: HOSPADM

## 2019-02-05 RX ORDER — AMOXICILLIN 500 MG/1
500 CAPSULE ORAL 3 TIMES DAILY
Qty: 21 CAPSULE | Refills: 0 | Status: SHIPPED | OUTPATIENT
Start: 2019-02-05 | End: 2019-02-12

## 2019-02-05 NOTE — PROGRESS NOTES
Subjective   Ligia Vargas is a 33 y.o. female.     Sore Throat    This is a new problem. The current episode started yesterday. The problem has been gradually worsening. The maximum temperature recorded prior to her arrival was 100.4 - 100.9 F. The fever has been present for 1 to 2 days. The pain is at a severity of 8/10. The pain is moderate. Associated symptoms include headaches. Pertinent negatives include no ear discharge or ear pain.   Headache    This is a new problem. The current episode started more than 1 month ago. The problem occurs intermittently. The problem has been waxing and waning. The pain is located in the temporal region. The pain does not radiate. The pain quality is not similar to prior headaches. The quality of the pain is described as throbbing. The pain is at a severity of 5/10. The pain is moderate. Associated symptoms include abnormal behavior, blurred vision, insomnia, scalp tenderness and a sore throat. Pertinent negatives include no ear pain, eye pain, hearing loss, muscle aches, phonophobia, photophobia, rhinorrhea, seizures, sinus pressure, tingling or weight loss.   URI    Associated symptoms include headaches and a sore throat. Pertinent negatives include no ear pain or rhinorrhea.   Depression   Visit Type: follow-up  Patient presents with the following symptoms: depressed mood and insomnia.  Patient is not experiencing: nervousness/anxiety, suicidal ideas, suicidal planning, thoughts of death and weight loss.    Fibromyalgia   This is a chronic problem. The current episode started more than 1 year ago. The problem occurs constantly. The problem has been gradually worsening. Associated symptoms include fatigue, headaches, myalgias and a sore throat.        The following portions of the patient's history were reviewed and updated as appropriate: allergies, current medications, past family history, past medical history, past social history, past surgical history and problem  list.    Review of Systems   Constitutional: Positive for fatigue. Negative for weight loss.   HENT: Positive for sore throat. Negative for ear discharge, ear pain, hearing loss, rhinorrhea and sinus pressure.    Eyes: Positive for blurred vision. Negative for photophobia and pain.   Musculoskeletal: Positive for myalgias.   Neurological: Positive for headaches. Negative for tingling and seizures.   Psychiatric/Behavioral: Negative for suicidal ideas. The patient has insomnia. The patient is not nervous/anxious.        Objective   Physical Exam   Constitutional: She is oriented to person, place, and time. She appears well-developed and well-nourished. No distress.   HENT:   Head: Normocephalic and atraumatic.   Right Ear: Hearing and tympanic membrane normal.   Left Ear: Hearing and tympanic membrane normal.   Nose: Mucosal edema and rhinorrhea present.   Mouth/Throat: Uvula is midline. Oropharyngeal exudate present.   Cardiovascular: Regular rhythm and normal heart sounds.   Pulmonary/Chest: Effort normal and breath sounds normal.   Musculoskeletal:        Left upper leg: She exhibits no tenderness, no bony tenderness, no swelling, no edema, no deformity and no laceration.   Neurological: She is alert and oriented to person, place, and time. No cranial nerve deficit or sensory deficit. Coordination and gait normal.   Skin: Skin is warm and dry. She is not diaphoretic.   Psychiatric: She has a normal mood and affect. Her speech is normal and behavior is normal. Judgment and thought content normal.   Nursing note and vitals reviewed.      Assessment/Plan   Problems Addressed this Visit        Respiratory    Acute sinusitis       Nervous and Auditory    Primary fibromyalgia - Primary    Headache       Other    Depression    Relevant Medications    DULoxetine (CYMBALTA) 60 MG capsule            She is failed gabapentin in the past for her fibromyalgia we discussed Lyrica today.  However since he also is having  depression I would like to give her a trial of Cymbalta and see if we can address to problems with one medication.

## 2019-02-20 ENCOUNTER — OFFICE VISIT (OUTPATIENT)
Dept: OBSTETRICS AND GYNECOLOGY | Facility: CLINIC | Age: 34
End: 2019-02-20

## 2019-02-20 VITALS
SYSTOLIC BLOOD PRESSURE: 120 MMHG | DIASTOLIC BLOOD PRESSURE: 70 MMHG | BODY MASS INDEX: 18.58 KG/M2 | WEIGHT: 115.6 LBS | HEIGHT: 66 IN

## 2019-02-20 DIAGNOSIS — Z30.011 ENCOUNTER FOR INITIAL PRESCRIPTION OF CONTRACEPTIVE PILLS: Primary | ICD-10-CM

## 2019-02-20 DIAGNOSIS — L30.9 VULVAR DERMATITIS: ICD-10-CM

## 2019-02-20 DIAGNOSIS — Z72.0 NICOTINE ABUSE: ICD-10-CM

## 2019-02-20 PROCEDURE — 99213 OFFICE O/P EST LOW 20 MIN: CPT | Performed by: OBSTETRICS & GYNECOLOGY

## 2019-02-20 RX ORDER — MOMETASONE FUROATE 1 MG/G
CREAM TOPICAL
Qty: 45 G | Refills: 1 | Status: SHIPPED | OUTPATIENT
Start: 2019-02-20 | End: 2019-09-22

## 2019-02-20 RX ORDER — NORETHINDRONE ACETATE AND ETHINYL ESTRADIOL 1; .02 MG/1; MG/1
1 TABLET ORAL DAILY
Qty: 21 TABLET | Refills: 12 | Status: SHIPPED | OUTPATIENT
Start: 2019-02-20 | End: 2019-09-22 | Stop reason: SDDI

## 2019-02-20 NOTE — PROGRESS NOTES
Chief Complaint   Patient presents with   • Vaginal Itching     pt states recurrent infections with vaginal discharge, itching, and foul odor     Ligia Vargas is a 33 y.o. year old .  No LMP recorded.  She presents with a chief complaint of recurrent symptoms of vulvar itching.  Patient states she uses Aveeno unscented soap for bathing, uses ALL unscented laundry detergent but uses a panty liner daily.  She denies any itching in the vagina.  She also would like to start on a birth control pill.  We discussed using a low dose one secondary to smoking and the risk of blood clots with smoking and ocp's. Patient understands the risk.         Past Medical History:   Diagnosis Date   • Abnormal Pap smear of cervix     REPEAT PAP SMEAR NORMAL    • Acute otitis externa 10/14/2015   • Acute sinusitis 2015   • Allergic rhinitis 2015   • Anxiety 2016   • Asthma    • Bleeding hemorrhoids 2015   • Depression    • Endometriosis    • Headache 10/20/2015   • Impacted cerumen 10/14/2015   • Irregular menstruation 2015   • Irregular periods 2016   • Knee pain 10/06/2015   • Loss of hair 2016   • Nonvenomous insect bite 2016   • Pain in eye 10/20/2015   • Palpitations    • Paresthesia 2015   • Pregnancy     A0   • Primary fibromyalgia 2016   • Shoulder joint pain 10/20/2015   • Tremor 2015     Past Surgical History:   Procedure Laterality Date   • DIAGNOSTIC LAPAROSCOPY      DR. AGUERO    • ENDOSCOPY  2016    Normal   • MOUTH SURGERY      [place] teeth extraction   • WISDOM TOOTH EXTRACTION         Current Outpatient Medications:   •  ibuprofen (ADVIL,MOTRIN) 600 MG tablet, Take 600 mg by mouth Every 6 (Six) Hours As Needed for Mild Pain ., Disp: , Rfl:   •  mometasone (ELOCON) 0.1 % cream, Apply to affected area once daily x 14 days, Disp: 45 g, Rfl: 1  •  norethindrone-ethinyl estradiol (LOESTRIN , ,) 1-20 MG-MCG per tablet, Take  "1 tablet by mouth Daily., Disp: 21 tablet, Rfl: 12  Allergies   Allergen Reactions   • Fish-Derived Products Nausea And Vomiting   • Adhesive Tape Itching and Rash     Social History    Tobacco Use      Smoking status: Current Every Day Smoker        Packs/day: 0.50        Years: 14.00        Pack years: 7        Types: Cigarettes        Quit date: 2017        Years since quittin.0      Smokeless tobacco: Never Used      Tobacco comment: smokes less than a half a pack a day     Review of Systems negative except for Genitourinary symptoms as indicated in HPI.    /70   Ht 167.6 cm (66\")   Wt 52.4 kg (115 lb 9.6 oz)   BMI 18.66 kg/m²     General:  well developed; well nourished  no acute distress  appears stated age   Thyroid: not examined   Lungs:  breathing is unlabored   Heart:  regular rate and rhythm   Breasts:  Not performed.   Abdomen: soft, non-tender; no masses   Pelvis: Not performed.  External genitalia:  perivestibular erythema present; vagina without discharge, menstral flow present     Lab Review   No data reviewed      Imaging   No data reviewed    Assessment      Diagnosis Plan   1. Encounter for initial prescription of contraceptive pills     2. Vulvar dermatitis     3. Nicotine abuse            Plan   1. Elocon cream to vulvar dermatitis daily x 14days  2. Switch to organic panty liners  3. Counseled smoking cessation  4. rx for low dose ocp  5. rtc prn  6. The options presented to the patient were:         This note was electronically signed.    Adelaide Jamil MD  2019  "

## 2019-03-08 ENCOUNTER — OFFICE VISIT (OUTPATIENT)
Dept: FAMILY MEDICINE CLINIC | Facility: CLINIC | Age: 34
End: 2019-03-08

## 2019-03-08 VITALS
HEIGHT: 66 IN | WEIGHT: 116.2 LBS | SYSTOLIC BLOOD PRESSURE: 120 MMHG | BODY MASS INDEX: 18.68 KG/M2 | HEART RATE: 79 BPM | OXYGEN SATURATION: 98 % | DIASTOLIC BLOOD PRESSURE: 72 MMHG

## 2019-03-08 DIAGNOSIS — L98.9 SKIN LESION OF RIGHT ARM: ICD-10-CM

## 2019-03-08 DIAGNOSIS — M79.7 PRIMARY FIBROMYALGIA: Primary | ICD-10-CM

## 2019-03-08 DIAGNOSIS — F32.89 OTHER DEPRESSION: ICD-10-CM

## 2019-03-08 PROCEDURE — 99213 OFFICE O/P EST LOW 20 MIN: CPT | Performed by: FAMILY MEDICINE

## 2019-03-08 RX ORDER — TRIAMCINOLONE ACETONIDE 0.25 MG/G
CREAM TOPICAL 2 TIMES DAILY
Qty: 15 G | Refills: 1 | Status: SHIPPED | OUTPATIENT
Start: 2019-03-08 | End: 2019-09-22

## 2019-03-08 NOTE — PROGRESS NOTES
Subjective   Ligia Vargas is a 33 y.o. female.     Depression   Visit Type: follow-up  Patient is not experiencing: depressed mood, irritability and nervousness/anxiety.    Fibromyalgia   This is a chronic problem. The current episode started more than 1 year ago. The problem has been unchanged. Associated symptoms include myalgias.        The following portions of the patient's history were reviewed and updated as appropriate: allergies, current medications, past family history, past medical history, past social history, past surgical history and problem list.    Review of Systems   Constitutional: Negative for irritability.   Musculoskeletal: Positive for myalgias.   Skin: Positive for wound.   Psychiatric/Behavioral: The patient is not nervous/anxious.        Objective   Physical Exam   Constitutional: She is oriented to person, place, and time. She appears well-developed and well-nourished. No distress.   HENT:   Head: Normocephalic and atraumatic.   Neurological: She is alert and oriented to person, place, and time.   Skin: Skin is warm and dry. Lesion noted. She is not diaphoretic.        Psychiatric: She has a normal mood and affect. Her behavior is normal. Judgment and thought content normal.   Nursing note and vitals reviewed.      Assessment/Plan   Problems Addressed this Visit        Nervous and Auditory    Primary fibromyalgia - Primary       Other    Depression      Other Visit Diagnoses     Skin lesion of right arm        Relevant Medications    triamcinolone (KENALOG) 0.025 % cream               She got nervous and anxious with the Cymbalta and stopped it.  She just is feeling pretty good right now and does not want to try other medicines.  She is also failed gabapentin in the past but had some lethargy with Lyrica.  She is also failed Savella.  We will just observe her symptoms at this time.

## 2019-11-22 ENCOUNTER — TELEPHONE (OUTPATIENT)
Dept: OBSTETRICS AND GYNECOLOGY | Facility: CLINIC | Age: 34
End: 2019-11-22

## 2019-11-22 NOTE — TELEPHONE ENCOUNTER
I called to remind the patient of her upcoming appointment on Monday. The patient did not answer. I left a voicemail for patient to return my call.

## 2019-11-25 ENCOUNTER — INITIAL PRENATAL (OUTPATIENT)
Dept: OBSTETRICS AND GYNECOLOGY | Facility: CLINIC | Age: 34
End: 2019-11-25

## 2019-11-25 ENCOUNTER — APPOINTMENT (OUTPATIENT)
Dept: LAB | Facility: HOSPITAL | Age: 34
End: 2019-11-25

## 2019-11-25 DIAGNOSIS — O99.331 MATERNAL TOBACCO USE IN FIRST TRIMESTER: ICD-10-CM

## 2019-11-25 DIAGNOSIS — L85.3 DRY SKIN: ICD-10-CM

## 2019-11-25 DIAGNOSIS — O21.9 NAUSEA AND VOMITING DURING PREGNANCY PRIOR TO 22 WEEKS GESTATION: ICD-10-CM

## 2019-11-25 DIAGNOSIS — O99.611 CONSTIPATION IN PREGNANCY IN FIRST TRIMESTER: ICD-10-CM

## 2019-11-25 DIAGNOSIS — Z87.59 HISTORY OF POSTPARTUM HYPERTENSION: ICD-10-CM

## 2019-11-25 DIAGNOSIS — F41.9 ANXIETY: ICD-10-CM

## 2019-11-25 DIAGNOSIS — F32.89 OTHER DEPRESSION: ICD-10-CM

## 2019-11-25 DIAGNOSIS — Z32.01 POSITIVE PREGNANCY TEST: Primary | ICD-10-CM

## 2019-11-25 DIAGNOSIS — K59.00 CONSTIPATION IN PREGNANCY IN FIRST TRIMESTER: ICD-10-CM

## 2019-11-25 DIAGNOSIS — Z86.79 HISTORY OF POSTPARTUM HYPERTENSION: ICD-10-CM

## 2019-11-25 DIAGNOSIS — O26.811: ICD-10-CM

## 2019-11-25 DIAGNOSIS — Z12.4 CERVICAL CANCER SCREENING: ICD-10-CM

## 2019-11-25 DIAGNOSIS — Z64.1 MULTIGRAVIDA: ICD-10-CM

## 2019-11-25 PROBLEM — Z91.89 AT RISK FOR POSTPARTUM DEPRESSION: Status: RESOLVED | Noted: 2017-10-13 | Resolved: 2019-11-25

## 2019-11-25 PROBLEM — R63.4 WEIGHT LOSS: Status: RESOLVED | Noted: 2018-03-02 | Resolved: 2019-11-25

## 2019-11-25 PROBLEM — Z72.0 NICOTINE ABUSE: Status: RESOLVED | Noted: 2017-11-01 | Resolved: 2019-11-25

## 2019-11-25 PROBLEM — R11.2 NAUSEA AND VOMITING: Status: RESOLVED | Noted: 2018-03-02 | Resolved: 2019-11-25

## 2019-11-25 PROBLEM — M79.18 LEFT BUTTOCK PAIN: Status: RESOLVED | Noted: 2018-12-03 | Resolved: 2019-11-25

## 2019-11-25 PROBLEM — R42 DIZZINESS: Status: RESOLVED | Noted: 2017-08-31 | Resolved: 2019-11-25

## 2019-11-25 PROBLEM — R63.4 WEIGHT LOSS, UNINTENTIONAL: Status: RESOLVED | Noted: 2018-08-17 | Resolved: 2019-11-25

## 2019-11-25 PROBLEM — R00.2 PALPITATIONS: Status: RESOLVED | Noted: 2017-08-31 | Resolved: 2019-11-25

## 2019-11-25 PROBLEM — R10.84 GENERALIZED ABDOMINAL PAIN: Status: RESOLVED | Noted: 2018-03-02 | Resolved: 2019-11-25

## 2019-11-25 PROBLEM — J02.9 SORE THROAT: Status: RESOLVED | Noted: 2018-11-29 | Resolved: 2019-11-25

## 2019-11-25 PROBLEM — Z87.42 HISTORY OF ENDOMETRIOSIS: Status: ACTIVE | Noted: 2019-11-25

## 2019-11-25 PROCEDURE — 87661 TRICHOMONAS VAGINALIS AMPLIF: CPT | Performed by: NURSE PRACTITIONER

## 2019-11-25 PROCEDURE — 0501F PRENATAL FLOW SHEET: CPT | Performed by: NURSE PRACTITIONER

## 2019-11-25 PROCEDURE — 85025 COMPLETE CBC W/AUTO DIFF WBC: CPT | Performed by: NURSE PRACTITIONER

## 2019-11-25 PROCEDURE — 87591 N.GONORRHOEAE DNA AMP PROB: CPT | Performed by: NURSE PRACTITIONER

## 2019-11-25 PROCEDURE — 81003 URINALYSIS AUTO W/O SCOPE: CPT | Performed by: NURSE PRACTITIONER

## 2019-11-25 PROCEDURE — 87624 HPV HI-RISK TYP POOLED RSLT: CPT | Performed by: NURSE PRACTITIONER

## 2019-11-25 PROCEDURE — 86900 BLOOD TYPING SEROLOGIC ABO: CPT | Performed by: NURSE PRACTITIONER

## 2019-11-25 PROCEDURE — 84443 ASSAY THYROID STIM HORMONE: CPT | Performed by: NURSE PRACTITIONER

## 2019-11-25 PROCEDURE — 80307 DRUG TEST PRSMV CHEM ANLYZR: CPT | Performed by: NURSE PRACTITIONER

## 2019-11-25 PROCEDURE — 87086 URINE CULTURE/COLONY COUNT: CPT | Performed by: NURSE PRACTITIONER

## 2019-11-25 PROCEDURE — 81025 URINE PREGNANCY TEST: CPT | Performed by: NURSE PRACTITIONER

## 2019-11-25 PROCEDURE — 86901 BLOOD TYPING SEROLOGIC RH(D): CPT | Performed by: NURSE PRACTITIONER

## 2019-11-25 PROCEDURE — 86803 HEPATITIS C AB TEST: CPT | Performed by: NURSE PRACTITIONER

## 2019-11-25 PROCEDURE — G0432 EIA HIV-1/HIV-2 SCREEN: HCPCS | Performed by: NURSE PRACTITIONER

## 2019-11-25 PROCEDURE — 87340 HEPATITIS B SURFACE AG IA: CPT | Performed by: NURSE PRACTITIONER

## 2019-11-25 PROCEDURE — 80081 OBSTETRIC PANEL INC HIV TSTG: CPT | Performed by: NURSE PRACTITIONER

## 2019-11-25 PROCEDURE — G0123 SCREEN CERV/VAG THIN LAYER: HCPCS | Performed by: NURSE PRACTITIONER

## 2019-11-25 PROCEDURE — 86850 RBC ANTIBODY SCREEN: CPT | Performed by: NURSE PRACTITIONER

## 2019-11-25 PROCEDURE — 87491 CHLMYD TRACH DNA AMP PROBE: CPT | Performed by: NURSE PRACTITIONER

## 2019-11-25 RX ORDER — DOCUSATE SODIUM 100 MG/1
100 CAPSULE, LIQUID FILLED ORAL 2 TIMES DAILY
Qty: 60 CAPSULE | Refills: 6 | Status: SHIPPED | OUTPATIENT
Start: 2019-11-25 | End: 2020-08-17

## 2019-11-26 NOTE — PROGRESS NOTES
CC: Initial Prenatal visit    Ligia Vargas is a 34 y.o.  at 9w0d per patient uncertain LMP.  Pt reports medical history of childhood asthma, anxiety, depression, post partum hypertension, endometriosis, and constipation.  Surgical history wisdom teeth extraction, laparoscopic surgery.  Current medications reglan and prenatal vitamin.  Current everyday tobacco smoker, 0.5 ppd.  Pt denies alcohol or elicit drug use.  Pt denies history of genetic conditions, but is considering screening for down syndrome.      OB History    Para Term  AB Living   2 1 1     1   SAB TAB Ectopic Molar Multiple Live Births           0 1      # Outcome Date GA Lbr Azam/2nd Weight Sex Delivery Anes PTL Lv   2 Current            1 Term 17 40w0d / 00:19 3430 g (7 lb 9 oz) M Vag-Spont None N IRAIS        ROS: +fatigue, dry skin, nausea and vomiting, constipation.  Pt denies cramping, dysuria, or vaginal bleeding.      PE: see NOB physical in episode tab, pap smear collected          2nd PREGNANCY Problems (from 19 to present)     Problem Noted Resolved    History of postpartum hypertension 2019 by Frida Sims APRN No    Nausea and vomiting during pregnancy prior to 22 weeks gestation 2019 by Frida Sims APRN No    Constipation in pregnancy in first trimester 2019 by Frida Sims APRN No    Multigravida 2019 by Frida Sims APRN No    Maternal tobacco use in first trimester 2019 by Frida Sims APRN No    Depression  by Ezequiel Aguilar MD No    Anxiety 2016 by Ezequiel Aguilar MD No          A/P: Ligia Vargas is a 34 y.o.  at 9w0d.  NOB labs today  Reviewed Medications safe during pregnancy  - RTC in 2 weeks for KYM appt and dating scan or sooner if needed     Diagnosis Plan   1. Positive pregnancy test     2. History of postpartum hypertension  Pt reports being very  stressed pp, BP good today   3. Maternal tobacco use in first trimester  Educated on risks of smoking during pregnancy   Strongly encouraged pt to wean down with ultimate goal complete smoking cessation   4. Multigravida     5. Constipation in pregnancy in first trimester  docusate sodium (COLACE) 100 MG capsule   6. Cervical cancer screening  Pap collected today   7. Nausea and vomiting during pregnancy prior to 22 weeks gestation  Dry crackers in am, small frequent meals, reglan prn      8. Other depression  Mood stable, denies SI/HI, declines needs for medications   9. Anxiety     10.    Fatigue in pregnancy      CBC collected today, pt reports 2 year old son sleeps with         her and wakes her up frequently  11.     Dry skin       Avoid hot showers, apply moisturizing lotion as soon as you         get out of shower and reapply as needed          Increase water intake    Frida Sims, CATHERINE  11/25/2019  9:31 PM

## 2019-12-09 LAB
B-HCG UR QL: POSITIVE
INTERNAL NEGATIVE CONTROL: NEGATIVE
INTERNAL POSITIVE CONTROL: POSITIVE
Lab: ABNORMAL

## 2019-12-17 ENCOUNTER — ROUTINE PRENATAL (OUTPATIENT)
Dept: OBSTETRICS AND GYNECOLOGY | Facility: CLINIC | Age: 34
End: 2019-12-17

## 2019-12-17 VITALS — DIASTOLIC BLOOD PRESSURE: 70 MMHG | SYSTOLIC BLOOD PRESSURE: 118 MMHG | WEIGHT: 119 LBS | BODY MASS INDEX: 19.21 KG/M2

## 2019-12-17 DIAGNOSIS — O99.611 CONSTIPATION IN PREGNANCY IN FIRST TRIMESTER: ICD-10-CM

## 2019-12-17 DIAGNOSIS — O99.331 MATERNAL TOBACCO USE IN FIRST TRIMESTER: ICD-10-CM

## 2019-12-17 DIAGNOSIS — Z3A.12 12 WEEKS GESTATION OF PREGNANCY: ICD-10-CM

## 2019-12-17 DIAGNOSIS — K59.00 CONSTIPATION IN PREGNANCY IN FIRST TRIMESTER: ICD-10-CM

## 2019-12-17 DIAGNOSIS — O21.9 NAUSEA AND VOMITING DURING PREGNANCY PRIOR TO 22 WEEKS GESTATION: Primary | ICD-10-CM

## 2019-12-17 PROBLEM — Z87.42 HISTORY OF ENDOMETRIOSIS: Status: RESOLVED | Noted: 2019-11-25 | Resolved: 2019-12-17

## 2019-12-17 PROCEDURE — 0502F SUBSEQUENT PRENATAL CARE: CPT | Performed by: NURSE PRACTITIONER

## 2019-12-17 NOTE — PROGRESS NOTES
CC: Prenatal visit; hx reviewed, no changes.     Ligia Vargas is a 34 y.o.  at 12w1d.  Doing well.  Feeling fatigued and nauseated but denies vomiting. Denies contractions, LOF, or VB.      /70   Wt 54 kg (119 lb)   LMP 2019 (Approximate)   BMI 19.21 kg/m²   SVE: NA    Dating scan reviewed. CRL 13w0d c/w LMP of 19 with GA of 12w1d. FINAL PAOLA stays 20 based on LMP. CL- 3.6cm.      Fetal Heart Rate: 147    2nd PREGNANCY Problems (from 19 to present)     Problem Noted Resolved    History of postpartum hypertension 2019 by Frida Sims APRN No    Nausea and vomiting during pregnancy prior to 22 weeks gestation 2019 by Frida Sims APRN No    Constipation in pregnancy in first trimester 2019 by Frida Sims APRN No    Maternal tobacco use in first trimester 2019 by Frida Sims APRN No    Depression  by Ezequiel Aguilar MD No    Anxiety 2016 by Ezequiel Aguilar MD No          A/P: Ligia Vargas is a 34 y.o.  at 12w1d.  - RTC in 4 weeks     Diagnosis Plan   1. Nausea and vomiting during pregnancy prior to 22 weeks gestation  Continue Reglan PRN   2. Constipation in pregnancy in first trimester  Continue colace PRN   3. Maternal tobacco use in first trimester  Down to -2 ppd; encouraged continued cessation efforts.   4. 12 weeks gestation of pregnancy       CATHERINE Fair  2019  3:30 PM

## 2019-12-23 DIAGNOSIS — Z78.9 DATE OF LAST MENSTRUAL PERIOD (LMP) UNKNOWN: ICD-10-CM

## 2020-01-15 PROBLEM — O99.332 TOBACCO SMOKING AFFECTING PREGNANCY IN SECOND TRIMESTER: Status: ACTIVE | Noted: 2019-11-25

## 2020-01-15 PROBLEM — O99.340 DEPRESSION AFFECTING PREGNANCY: Status: ACTIVE | Noted: 2020-01-15

## 2020-01-15 PROBLEM — O09.92 SUPERVISION OF HIGH RISK PREGNANCY IN SECOND TRIMESTER: Status: ACTIVE | Noted: 2020-01-15

## 2020-01-15 PROBLEM — F32.A DEPRESSION AFFECTING PREGNANCY: Status: ACTIVE | Noted: 2020-01-15

## 2020-01-22 ENCOUNTER — ROUTINE PRENATAL (OUTPATIENT)
Dept: OBSTETRICS AND GYNECOLOGY | Facility: CLINIC | Age: 35
End: 2020-01-22

## 2020-01-22 VITALS — BODY MASS INDEX: 19.85 KG/M2 | SYSTOLIC BLOOD PRESSURE: 106 MMHG | WEIGHT: 123 LBS | DIASTOLIC BLOOD PRESSURE: 68 MMHG

## 2020-01-22 DIAGNOSIS — O21.9 NAUSEA AND VOMITING DURING PREGNANCY PRIOR TO 22 WEEKS GESTATION: ICD-10-CM

## 2020-01-22 DIAGNOSIS — O99.340 DEPRESSION AFFECTING PREGNANCY: ICD-10-CM

## 2020-01-22 DIAGNOSIS — F32.A DEPRESSION AFFECTING PREGNANCY: ICD-10-CM

## 2020-01-22 DIAGNOSIS — Z86.79 HISTORY OF POSTPARTUM HYPERTENSION: ICD-10-CM

## 2020-01-22 DIAGNOSIS — Z3A.17 17 WEEKS GESTATION OF PREGNANCY: Primary | ICD-10-CM

## 2020-01-22 DIAGNOSIS — Z36.89 ENCOUNTER FOR FETAL ANATOMIC SURVEY: ICD-10-CM

## 2020-01-22 DIAGNOSIS — O09.92 SUPERVISION OF HIGH RISK PREGNANCY IN SECOND TRIMESTER: ICD-10-CM

## 2020-01-22 DIAGNOSIS — O99.332 TOBACCO SMOKING AFFECTING PREGNANCY IN SECOND TRIMESTER: ICD-10-CM

## 2020-01-22 DIAGNOSIS — Z87.59 HISTORY OF POSTPARTUM HYPERTENSION: ICD-10-CM

## 2020-01-22 PROCEDURE — 0502F SUBSEQUENT PRENATAL CARE: CPT | Performed by: NURSE PRACTITIONER

## 2020-01-22 NOTE — PROGRESS NOTES
CC: Prenatal visit    Ligia Vargas is a 34 y.o.  at 17w2d.  Doing well.  No complaints.  Denies contractions, LOF, or VB.  Reports starting to feel FM.      /68   Wt 55.8 kg (123 lb)   LMP 2019 (Approximate)   BMI 19.85 kg/m²        Fetal Heart Rate: 160    2nd PREGNANCY Problems (from 19 to present)     Problem Noted Resolved    Depression affecting pregnancy 1/15/2020 by Ana Maria Graves MD No    Overview Signed 1/15/2020  1:01 PM by Ana Maria Graves MD     Depression/anxiety  No meds         Supervision of high risk pregnancy in second trimester 1/15/2020 by Ana Maria Graves MD No    Overview Signed 1/15/2020  1:06 PM by Ana Maria Graves MD     A pos / Rubella immune / GBS unk  Dating: LMP = 1TUS (13wk)  Genetics: Declined  Tdap: 28wk  Flu: Declined  Anatomy: 20wk  1h Glucola: 28wk  H&H/Plts: 28wk  Lab Results   Component Value Date    HGB 13.3 2019    HCT 37.6 2019     2019   Breast vs Bottle / BC undecided         History of postpartum hypertension 2019 by Frida Sims APRN No    Nausea and vomiting during pregnancy prior to 22 weeks gestation 2019 by Frida Sims APRN No    Tobacco smoking affecting pregnancy in second trimester 2019 by Frida Sims APRN No          A/P: Ligia Vargas is a 34 y.o.  at 17w2d.  - RTC in 2 weeks for KYM appt and anatomy scan or sooner if needed     Diagnosis Plan   1. 17 weeks gestation of pregnancy     2. Depression affecting pregnancy     3. Supervision of high risk pregnancy in second trimester     4. History of postpartum hypertension     5. Nausea and vomiting during pregnancy prior to 22 weeks gestation     6. Tobacco smoking affecting pregnancy in second trimester     7. Encounter for fetal anatomic survey  US Ob Detail Fetal Anatomy Single or First Gestation       Frida  CATHERINE Sims  1/22/2020  4:55 PM

## 2020-01-24 DIAGNOSIS — Z20.828 EXPOSURE TO INFLUENZA: Primary | ICD-10-CM

## 2020-01-24 RX ORDER — OSELTAMIVIR PHOSPHATE 75 MG/1
75 CAPSULE ORAL DAILY
Qty: 5 CAPSULE | Refills: 0 | Status: SHIPPED | OUTPATIENT
Start: 2020-01-24 | End: 2020-01-29

## 2020-02-06 ENCOUNTER — ROUTINE PRENATAL (OUTPATIENT)
Dept: OBSTETRICS AND GYNECOLOGY | Facility: CLINIC | Age: 35
End: 2020-02-06

## 2020-02-06 VITALS — WEIGHT: 123 LBS | BODY MASS INDEX: 19.85 KG/M2 | DIASTOLIC BLOOD PRESSURE: 60 MMHG | SYSTOLIC BLOOD PRESSURE: 102 MMHG

## 2020-02-06 DIAGNOSIS — Z87.59 HISTORY OF POSTPARTUM HYPERTENSION: ICD-10-CM

## 2020-02-06 DIAGNOSIS — Z86.79 HISTORY OF POSTPARTUM HYPERTENSION: ICD-10-CM

## 2020-02-06 DIAGNOSIS — IMO0002 EVALUATE ANATOMY NOT SEEN ON PRIOR SONOGRAM: ICD-10-CM

## 2020-02-06 DIAGNOSIS — O99.612 CONSTIPATION IN PREGNANCY IN SECOND TRIMESTER: ICD-10-CM

## 2020-02-06 DIAGNOSIS — O99.340 DEPRESSION AFFECTING PREGNANCY: ICD-10-CM

## 2020-02-06 DIAGNOSIS — F32.A DEPRESSION AFFECTING PREGNANCY: ICD-10-CM

## 2020-02-06 DIAGNOSIS — O09.92 SUPERVISION OF HIGH RISK PREGNANCY IN SECOND TRIMESTER: ICD-10-CM

## 2020-02-06 DIAGNOSIS — O99.332 TOBACCO SMOKING AFFECTING PREGNANCY IN SECOND TRIMESTER: ICD-10-CM

## 2020-02-06 DIAGNOSIS — Z3A.19 19 WEEKS GESTATION OF PREGNANCY: Primary | ICD-10-CM

## 2020-02-06 DIAGNOSIS — K59.00 CONSTIPATION IN PREGNANCY IN SECOND TRIMESTER: ICD-10-CM

## 2020-02-06 PROCEDURE — 0502F SUBSEQUENT PRENATAL CARE: CPT | Performed by: NURSE PRACTITIONER

## 2020-02-06 NOTE — PROGRESS NOTES
CC: Prenatal visit    Ligia Vargas is a 34 y.o.  at 19w3d.  Patient reports she has been stressed. Her two year old son was recently diagnosed with type 1 diabetes.  Mood stable.  Denies suicidal or homicidal ideations, contractions, LOF, or VB.  Reports good FM.    /60   Wt 55.8 kg (123 lb)   LMP 2019 (Approximate)   BMI 19.85 kg/m²     Reviewed prelim anatomy scan report with pt- fetus in transverse position, placenta anterior, no previa, AFV WNL, EFW 11 oz, sub optimal views of spine and heart remain, 3vc, its a BOY!, CL 4.8 cm     Fetal Heart Rate: 156 us    2nd PREGNANCY Problems (from 19 to present)     Problem Noted Resolved    Depression affecting pregnancy 1/15/2020 by Ana Maria Graves MD No    Overview Signed 1/15/2020  1:01 PM by Ana Maria Graves MD     Depression/anxiety  No meds         Supervision of high risk pregnancy in second trimester 1/15/2020 by Ana Maria Graves MD No    Overview Signed 1/15/2020  1:06 PM by Ana Maria Graves MD     A pos / Rubella immune / GBS unk  Dating: LMP = 1TUS (13wk)  Genetics: Declined  Tdap: 28wk  Flu: Declined  Anatomy: 20wk  1h Glucola: 28wk  H&H/Plts: 28wk  Lab Results   Component Value Date    HGB 13.3 2019    HCT 37.6 2019     2019   Breast vs Bottle / BC undecided         History of postpartum hypertension 2019 by Frida Sims APRN No    Nausea and vomiting during pregnancy prior to 22 weeks gestation 2019 by Frida Sims APRN No    Tobacco smoking affecting pregnancy in second trimester 2019 by Frida Sims APRN No          A/P: Ligia Vargas is a 34 y.o.  at 19w3d.  Encouraged pt to reach out to her support system and take time for herself to de stress.    - RTC in 4 weeks for KYM appt and f/u TAUS for sub optimal views      Diagnosis Plan   1. 19 weeks gestation of  pregnancy     2. Tobacco smoking affecting pregnancy in second trimester     3. Supervision of high risk pregnancy in second trimester     4. Constipation in pregnancy in second trimester     5. Depression affecting pregnancy     6. History of postpartum hypertension     7 Evaluate anatomy not seen on prior sonogram  US Ob Follow Up Transabdominal Approach       CATHERINE Hines  2/6/2020  9:35 AM

## 2020-02-13 DIAGNOSIS — Z36.89 ENCOUNTER FOR FETAL ANATOMIC SURVEY: ICD-10-CM

## 2020-03-05 ENCOUNTER — ROUTINE PRENATAL (OUTPATIENT)
Dept: OBSTETRICS AND GYNECOLOGY | Facility: CLINIC | Age: 35
End: 2020-03-05

## 2020-03-05 VITALS — DIASTOLIC BLOOD PRESSURE: 66 MMHG | BODY MASS INDEX: 20.98 KG/M2 | WEIGHT: 130 LBS | SYSTOLIC BLOOD PRESSURE: 100 MMHG

## 2020-03-05 DIAGNOSIS — O99.332 TOBACCO SMOKING AFFECTING PREGNANCY IN SECOND TRIMESTER: ICD-10-CM

## 2020-03-05 DIAGNOSIS — O09.92 SUPERVISION OF HIGH RISK PREGNANCY IN SECOND TRIMESTER: Primary | ICD-10-CM

## 2020-03-05 DIAGNOSIS — Z86.79 HISTORY OF POSTPARTUM HYPERTENSION: ICD-10-CM

## 2020-03-05 DIAGNOSIS — F32.A DEPRESSION AFFECTING PREGNANCY: ICD-10-CM

## 2020-03-05 DIAGNOSIS — Z3A.23 23 WEEKS GESTATION OF PREGNANCY: ICD-10-CM

## 2020-03-05 DIAGNOSIS — Z87.59 HISTORY OF POSTPARTUM HYPERTENSION: ICD-10-CM

## 2020-03-05 DIAGNOSIS — O99.340 DEPRESSION AFFECTING PREGNANCY: ICD-10-CM

## 2020-03-05 PROCEDURE — 0502F SUBSEQUENT PRENATAL CARE: CPT | Performed by: OBSTETRICS & GYNECOLOGY

## 2020-03-05 NOTE — PROGRESS NOTES
CC: Prenatal visit    Ligia Vargas is a 34 y.o.  at 23w3d.  Doing well.  No complaints.  Denies contractions, LOF, or VB.  Reports +FM.    /66   Wt 59 kg (130 lb)   LMP 2019 (Approximate)   BMI 20.98 kg/m²   Fundal Height (cm): 23 cm  Fetal Heart Rate: 149     Prelim US- EFW 689g (80%ile), STEFANY WNL, anatomy subopts WNL and visualized, cervix 3.6 cm, cephalic, placenta anterior    2nd PREGNANCY Problems (from 19 to present)     Problem Noted Resolved    Depression affecting pregnancy 1/15/2020 by Ana Maria Graves MD No    Overview Signed 1/15/2020  1:01 PM by Ana Maria Graves MD     Depression/anxiety  No meds         Supervision of high risk pregnancy in second trimester 1/15/2020 by Ana Maria Graves MD No    Overview Signed 1/15/2020  1:06 PM by Ana Maria Graves MD     A pos / Rubella immune / GBS unk  Dating: LMP = 1TUS (13wk)  Genetics: Declined  Tdap: 28wk  Flu: Declined  Anatomy: 20wk  1h Glucola: 28wk  H&H/Plts: 28wk  Lab Results   Component Value Date    HGB 13.3 2019    HCT 37.6 2019     2019   Breast vs Bottle / BC undecided         History of postpartum hypertension 2019 by Frida Sims APRN No    Overview Signed 3/5/2020  7:47 AM by Ana Maria Graves MD     PP GHTN; was on labetalol and nifedipine         Tobacco smoking affecting pregnancy in second trimester 2019 by Frida Sims APRN No    Overview Signed 3/5/2020  7:48 AM by Ana Maria Graves MD     Encouraged cessation             A/P: Ligia Vargas is a 34 y.o.  at 23w3d.  - RTC in 4 weeks  - 3T labs at next visit  - Tdap at next visit     Diagnosis Plan   1. Supervision of high risk pregnancy in second trimester  CBC (No Diff)    Glucose, Post 50 Gm Glucola   2. Depression affecting pregnancy     3. History of postpartum hypertension     4. Tobacco  smoking affecting pregnancy in second trimester     5. 23 weeks gestation of pregnancy       Ana Maria Graves MD  3/5/2020  2:43 PM

## 2020-03-19 ENCOUNTER — TELEPHONE (OUTPATIENT)
Dept: OBSTETRICS AND GYNECOLOGY | Facility: CLINIC | Age: 35
End: 2020-03-19

## 2020-03-19 NOTE — TELEPHONE ENCOUNTER
Patient called and stated she has had diarrhea and abdominal cramping instructed patient to review medication in pregnancy list and she can try something from there as well as staying hydrated.  If symptoms worsen or she has other concerns she should call or come be evaluated on labor and delivery she verbalized understanding.

## 2020-03-20 DIAGNOSIS — IMO0002 EVALUATE ANATOMY NOT SEEN ON PRIOR SONOGRAM: ICD-10-CM

## 2020-04-02 ENCOUNTER — LAB (OUTPATIENT)
Dept: LAB | Facility: HOSPITAL | Age: 35
End: 2020-04-02

## 2020-04-02 ENCOUNTER — ROUTINE PRENATAL (OUTPATIENT)
Dept: OBSTETRICS AND GYNECOLOGY | Facility: CLINIC | Age: 35
End: 2020-04-02

## 2020-04-02 ENCOUNTER — TELEPHONE (OUTPATIENT)
Dept: OBSTETRICS AND GYNECOLOGY | Facility: CLINIC | Age: 35
End: 2020-04-02

## 2020-04-02 VITALS — SYSTOLIC BLOOD PRESSURE: 110 MMHG | WEIGHT: 137 LBS | DIASTOLIC BLOOD PRESSURE: 66 MMHG | BODY MASS INDEX: 22.11 KG/M2

## 2020-04-02 DIAGNOSIS — F32.A DEPRESSION AFFECTING PREGNANCY: ICD-10-CM

## 2020-04-02 DIAGNOSIS — N89.8 VAGINAL DISCHARGE: ICD-10-CM

## 2020-04-02 DIAGNOSIS — Z3A.27 27 WEEKS GESTATION OF PREGNANCY: ICD-10-CM

## 2020-04-02 DIAGNOSIS — O09.92 SUPERVISION OF HIGH RISK PREGNANCY IN SECOND TRIMESTER: ICD-10-CM

## 2020-04-02 DIAGNOSIS — O09.92 SUPERVISION OF HIGH RISK PREGNANCY IN SECOND TRIMESTER: Primary | ICD-10-CM

## 2020-04-02 DIAGNOSIS — O99.332 TOBACCO SMOKING AFFECTING PREGNANCY IN SECOND TRIMESTER: ICD-10-CM

## 2020-04-02 DIAGNOSIS — O99.340 DEPRESSION AFFECTING PREGNANCY: ICD-10-CM

## 2020-04-02 DIAGNOSIS — Z86.79 HISTORY OF POSTPARTUM HYPERTENSION: ICD-10-CM

## 2020-04-02 DIAGNOSIS — Z87.59 HISTORY OF POSTPARTUM HYPERTENSION: ICD-10-CM

## 2020-04-02 LAB
CANDIDA ALBICANS: POSITIVE
DEPRECATED RDW RBC AUTO: 39.8 FL (ref 37–54)
ERYTHROCYTE [DISTWIDTH] IN BLOOD BY AUTOMATED COUNT: 11.5 % (ref 12.3–15.4)
GARDNERELLA VAGINALIS: NEGATIVE
GLUCOSE 1H P 100 G GLC PO SERPL-MCNC: 89 MG/DL (ref 60–140)
HCT VFR BLD AUTO: 29.8 % (ref 34–46.6)
HGB BLD-MCNC: 10.9 G/DL (ref 12–15.9)
MCH RBC QN AUTO: 34.9 PG (ref 26.6–33)
MCHC RBC AUTO-ENTMCNC: 36.6 G/DL (ref 31.5–35.7)
MCV RBC AUTO: 95.5 FL (ref 79–97)
PLATELET # BLD AUTO: 229 10*3/MM3 (ref 140–450)
PMV BLD AUTO: 10.6 FL (ref 6–12)
RBC # BLD AUTO: 3.12 10*6/MM3 (ref 3.77–5.28)
T VAGINALIS DNA VAG QL PROBE+SIG AMP: NEGATIVE
WBC NRBC COR # BLD: 9.15 10*3/MM3 (ref 3.4–10.8)

## 2020-04-02 PROCEDURE — 87510 GARDNER VAG DNA DIR PROBE: CPT | Performed by: OBSTETRICS & GYNECOLOGY

## 2020-04-02 PROCEDURE — 82950 GLUCOSE TEST: CPT

## 2020-04-02 PROCEDURE — 87480 CANDIDA DNA DIR PROBE: CPT | Performed by: OBSTETRICS & GYNECOLOGY

## 2020-04-02 PROCEDURE — 36415 COLL VENOUS BLD VENIPUNCTURE: CPT

## 2020-04-02 PROCEDURE — 87660 TRICHOMONAS VAGIN DIR PROBE: CPT | Performed by: OBSTETRICS & GYNECOLOGY

## 2020-04-02 PROCEDURE — 0502F SUBSEQUENT PRENATAL CARE: CPT | Performed by: OBSTETRICS & GYNECOLOGY

## 2020-04-02 PROCEDURE — 90715 TDAP VACCINE 7 YRS/> IM: CPT | Performed by: OBSTETRICS & GYNECOLOGY

## 2020-04-02 PROCEDURE — 90471 IMMUNIZATION ADMIN: CPT | Performed by: OBSTETRICS & GYNECOLOGY

## 2020-04-02 PROCEDURE — 85027 COMPLETE CBC AUTOMATED: CPT

## 2020-04-02 RX ORDER — FLUCONAZOLE 150 MG/1
TABLET ORAL
Qty: 2 TABLET | Refills: 0 | Status: SHIPPED | OUTPATIENT
Start: 2020-04-02 | End: 2020-04-23

## 2020-04-02 NOTE — TELEPHONE ENCOUNTER
----- Message from An aMaria Graves MD sent at 4/2/2020 12:38 PM CDT -----  Please let know +yeast.  Please send in Diflucan 150mg q3d x2 doses.

## 2020-04-02 NOTE — PROGRESS NOTES
"CC: Prenatal visit    Ligia Vargas is a 34 y.o.  at 27w3d.  Doing well.  Denies contractions, LOF, or VB.  Reports good FM.  She works at [employer] where there have been several people who have been sick but she is unsure if any were tested and if they were positive.  She states that she is \"freaking out\" about it.  She also has allergies and doesn't know what she can take for that.    /66   Wt 62.1 kg (137 lb)   LMP 2019 (Approximate)   BMI 22.11 kg/m²   Fundal Height (cm): 27 cm  Fetal Heart Rate: 161    2nd PREGNANCY Problems (from 19 to present)     Problem Noted Resolved    Depression affecting pregnancy 1/15/2020 by Ana Maria Graves MD No    Overview Signed 1/15/2020  1:01 PM by Ana Maria Graves MD     Depression/anxiety  No meds         Supervision of high risk pregnancy in second trimester 1/15/2020 by Ana Maria Graves MD No    Overview Addendum 3/5/2020  2:44 PM by Ana Maria Graves MD     A pos / Rubella imm / GBS unk  Dating: LMP = 1TUS (13wk)  Genetics: Declined  Tdap: 28wk  Flu: Declined  Anatomy: WNL  1h Glucola: 28wk  H&H/Plts: 28wk  Lab Results   Component Value Date    HGB 13.3 2019    HCT 37.6 2019     2019   Breast vs Bottle / BC undecided         History of postpartum hypertension 2019 by Frida Sims, CATHERINE No    Overview Signed 3/5/2020  7:47 AM by Ana Maria Graves MD     PP GHTN; was on labetalol and nifedipine         Tobacco smoking affecting pregnancy in second trimester 2019 by Frida Sims APRN No    Overview Signed 3/5/2020  7:48 AM by Ana Maria Graves MD     Encouraged cessation             A/P: Ligia Vargas is a 34 y.o.  at 27w3d.  - RTC in 3 weeks  - 3T labs today  - Tdap today  - May take Claritin for allergies  - Reviewed COVID-19 visitation policy  - Reviewed COVID-19 precautions     " Diagnosis Plan   1. Supervision of high risk pregnancy in second trimester  Tdap Vaccine Greater Than or Equal To 6yo IM   2. Depression affecting pregnancy     3. History of postpartum hypertension     4. Tobacco smoking affecting pregnancy in second trimester     5. 27 weeks gestation of pregnancy  Tdap Vaccine Greater Than or Equal To 6yo IM   6. Vaginal discharge  Gardnerella vaginalis, Trichomonas vaginalis, Candida albicans, DNA - Swab, Vagina     Ana Maria Graves MD  4/2/2020  09:04

## 2020-04-20 ENCOUNTER — HOSPITAL ENCOUNTER (EMERGENCY)
Facility: HOSPITAL | Age: 35
Discharge: HOME | End: 2020-04-20
Attending: EMERGENCY MEDICINE | Admitting: EMERGENCY MEDICINE

## 2020-04-20 ENCOUNTER — APPOINTMENT (OUTPATIENT)
Dept: GENERAL RADIOLOGY | Facility: HOSPITAL | Age: 35
End: 2020-04-20

## 2020-04-20 ENCOUNTER — TELEPHONE (OUTPATIENT)
Dept: OBSTETRICS AND GYNECOLOGY | Facility: CLINIC | Age: 35
End: 2020-04-20

## 2020-04-20 VITALS
BODY MASS INDEX: 22.34 KG/M2 | OXYGEN SATURATION: 100 % | HEART RATE: 82 BPM | DIASTOLIC BLOOD PRESSURE: 69 MMHG | RESPIRATION RATE: 20 BRPM | WEIGHT: 139 LBS | SYSTOLIC BLOOD PRESSURE: 106 MMHG | HEIGHT: 66 IN | TEMPERATURE: 99.9 F

## 2020-04-20 DIAGNOSIS — R06.00 DYSPNEA, UNSPECIFIED TYPE: Primary | ICD-10-CM

## 2020-04-20 DIAGNOSIS — Z34.93 NORMAL PREGNANCY IN THIRD TRIMESTER: ICD-10-CM

## 2020-04-20 LAB
ALBUMIN SERPL-MCNC: 3.2 G/DL (ref 3.5–5.2)
ALBUMIN/GLOB SERPL: 1.1 G/DL
ALP SERPL-CCNC: 97 U/L (ref 39–117)
ALT SERPL W P-5'-P-CCNC: 8 U/L (ref 1–33)
ANION GAP SERPL CALCULATED.3IONS-SCNC: 13 MMOL/L (ref 5–15)
AST SERPL-CCNC: 18 U/L (ref 1–32)
B PERT DNA SPEC QL NAA+PROBE: NOT DETECTED
BACTERIA UR QL AUTO: ABNORMAL /HPF
BASOPHILS # BLD AUTO: 0.02 10*3/MM3 (ref 0–0.2)
BASOPHILS NFR BLD AUTO: 0.2 % (ref 0–1.5)
BILIRUB SERPL-MCNC: 0.2 MG/DL (ref 0.2–1.2)
BILIRUB UR QL STRIP: NEGATIVE
BUN BLD-MCNC: 9 MG/DL (ref 6–20)
BUN/CREAT SERPL: 19.6 (ref 7–25)
C PNEUM DNA NPH QL NAA+NON-PROBE: NOT DETECTED
CALCIUM SPEC-SCNC: 8.6 MG/DL (ref 8.6–10.5)
CHLORIDE SERPL-SCNC: 105 MMOL/L (ref 98–107)
CLARITY UR: CLEAR
CO2 SERPL-SCNC: 19 MMOL/L (ref 22–29)
COLOR UR: YELLOW
CREAT BLD-MCNC: 0.46 MG/DL (ref 0.57–1)
CRP SERPL-MCNC: 0.21 MG/DL (ref 0–0.5)
DEPRECATED RDW RBC AUTO: 41.1 FL (ref 37–54)
EOSINOPHIL # BLD AUTO: 0.07 10*3/MM3 (ref 0–0.4)
EOSINOPHIL NFR BLD AUTO: 0.6 % (ref 0.3–6.2)
ERYTHROCYTE [DISTWIDTH] IN BLOOD BY AUTOMATED COUNT: 11.9 % (ref 12.3–15.4)
FLUAV H1 2009 PAND RNA NPH QL NAA+PROBE: NOT DETECTED
FLUAV H1 HA GENE NPH QL NAA+PROBE: NOT DETECTED
FLUAV H3 RNA NPH QL NAA+PROBE: NOT DETECTED
FLUAV SUBTYP SPEC NAA+PROBE: NOT DETECTED
FLUBV RNA ISLT QL NAA+PROBE: NOT DETECTED
GFR SERPL CREATININE-BSD FRML MDRD: >150 ML/MIN/1.73
GLOBULIN UR ELPH-MCNC: 3 GM/DL
GLUCOSE BLD-MCNC: 99 MG/DL (ref 65–99)
GLUCOSE UR STRIP-MCNC: NEGATIVE MG/DL
HADV DNA SPEC NAA+PROBE: NOT DETECTED
HCOV 229E RNA SPEC QL NAA+PROBE: NOT DETECTED
HCOV HKU1 RNA SPEC QL NAA+PROBE: NOT DETECTED
HCOV NL63 RNA SPEC QL NAA+PROBE: NOT DETECTED
HCOV OC43 RNA SPEC QL NAA+PROBE: NOT DETECTED
HCT VFR BLD AUTO: 31.7 % (ref 34–46.6)
HGB BLD-MCNC: 11.3 G/DL (ref 12–15.9)
HGB UR QL STRIP.AUTO: NEGATIVE
HMPV RNA NPH QL NAA+NON-PROBE: NOT DETECTED
HOLD SPECIMEN: NORMAL
HPIV1 RNA SPEC QL NAA+PROBE: NOT DETECTED
HPIV2 RNA SPEC QL NAA+PROBE: NOT DETECTED
HPIV3 RNA NPH QL NAA+PROBE: NOT DETECTED
HPIV4 P GENE NPH QL NAA+PROBE: NOT DETECTED
HYALINE CASTS UR QL AUTO: ABNORMAL /LPF
IMM GRANULOCYTES # BLD AUTO: 0.16 10*3/MM3 (ref 0–0.05)
IMM GRANULOCYTES NFR BLD AUTO: 1.4 % (ref 0–0.5)
KETONES UR QL STRIP: ABNORMAL
LDH SERPL-CCNC: 157 U/L (ref 135–214)
LEUKOCYTE ESTERASE UR QL STRIP.AUTO: ABNORMAL
LYMPHOCYTES # BLD AUTO: 2.22 10*3/MM3 (ref 0.7–3.1)
LYMPHOCYTES NFR BLD AUTO: 19.1 % (ref 19.6–45.3)
M PNEUMO IGG SER IA-ACNC: NOT DETECTED
MCH RBC QN AUTO: 34 PG (ref 26.6–33)
MCHC RBC AUTO-ENTMCNC: 35.6 G/DL (ref 31.5–35.7)
MCV RBC AUTO: 95.5 FL (ref 79–97)
MONOCYTES # BLD AUTO: 0.77 10*3/MM3 (ref 0.1–0.9)
MONOCYTES NFR BLD AUTO: 6.6 % (ref 5–12)
MUCOUS THREADS URNS QL MICRO: ABNORMAL /HPF
NEUTROPHILS # BLD AUTO: 8.38 10*3/MM3 (ref 1.7–7)
NEUTROPHILS NFR BLD AUTO: 72.1 % (ref 42.7–76)
NITRITE UR QL STRIP: NEGATIVE
NRBC BLD AUTO-RTO: 0 /100 WBC (ref 0–0.2)
PH UR STRIP.AUTO: 7 [PH] (ref 5–9)
PLATELET # BLD AUTO: 219 10*3/MM3 (ref 140–450)
PMV BLD AUTO: 9.6 FL (ref 6–12)
POTASSIUM BLD-SCNC: 3.6 MMOL/L (ref 3.5–5.2)
PROCALCITONIN SERPL-MCNC: 0.04 NG/ML (ref 0.1–0.25)
PROT SERPL-MCNC: 6.2 G/DL (ref 6–8.5)
PROT UR QL STRIP: ABNORMAL
RBC # BLD AUTO: 3.32 10*6/MM3 (ref 3.77–5.28)
RBC # UR: ABNORMAL /HPF
REF LAB TEST METHOD: ABNORMAL
RHINOVIRUS RNA SPEC NAA+PROBE: NOT DETECTED
RSV RNA NPH QL NAA+NON-PROBE: NOT DETECTED
SODIUM BLD-SCNC: 137 MMOL/L (ref 136–145)
SP GR UR STRIP: 1.03 (ref 1–1.03)
SQUAMOUS #/AREA URNS HPF: ABNORMAL /HPF
UROBILINOGEN UR QL STRIP: ABNORMAL
WBC NRBC COR # BLD: 11.62 10*3/MM3 (ref 3.4–10.8)
WBC UR QL AUTO: ABNORMAL /HPF
WHOLE BLOOD HOLD SPECIMEN: NORMAL

## 2020-04-20 PROCEDURE — 80053 COMPREHEN METABOLIC PANEL: CPT | Performed by: EMERGENCY MEDICINE

## 2020-04-20 PROCEDURE — 86140 C-REACTIVE PROTEIN: CPT | Performed by: EMERGENCY MEDICINE

## 2020-04-20 PROCEDURE — 83615 LACTATE (LD) (LDH) ENZYME: CPT | Performed by: EMERGENCY MEDICINE

## 2020-04-20 PROCEDURE — 99284 EMERGENCY DEPT VISIT MOD MDM: CPT

## 2020-04-20 PROCEDURE — 59025 FETAL NON-STRESS TEST: CPT

## 2020-04-20 PROCEDURE — 71045 X-RAY EXAM CHEST 1 VIEW: CPT

## 2020-04-20 PROCEDURE — 0099U HC BIOFIRE FILMARRAY RESP PANEL 1: CPT | Performed by: EMERGENCY MEDICINE

## 2020-04-20 PROCEDURE — 85025 COMPLETE CBC W/AUTO DIFF WBC: CPT | Performed by: EMERGENCY MEDICINE

## 2020-04-20 PROCEDURE — 59025 FETAL NON-STRESS TEST: CPT | Performed by: OBSTETRICS & GYNECOLOGY

## 2020-04-20 PROCEDURE — 81001 URINALYSIS AUTO W/SCOPE: CPT | Performed by: EMERGENCY MEDICINE

## 2020-04-20 PROCEDURE — 84145 PROCALCITONIN (PCT): CPT | Performed by: EMERGENCY MEDICINE

## 2020-04-20 RX ORDER — SODIUM CHLORIDE 0.9 % (FLUSH) 0.9 %
10 SYRINGE (ML) INJECTION AS NEEDED
Status: DISCONTINUED | OUTPATIENT
Start: 2020-04-20 | End: 2020-04-20 | Stop reason: HOSPADM

## 2020-04-20 RX ADMIN — Medication 10 ML: at 16:32

## 2020-04-20 NOTE — TELEPHONE ENCOUNTER
Patient called the office stating that for the last day and a half she has been having SOB with some heart palpitations but thinks she has no fever .  I spoke with Dr. Chatman and his recommendations were for her to be evaluated in the ED reviewed instructions with patient she verbalized understanding.

## 2020-04-23 ENCOUNTER — ROUTINE PRENATAL (OUTPATIENT)
Dept: OBSTETRICS AND GYNECOLOGY | Facility: CLINIC | Age: 35
End: 2020-04-23

## 2020-04-23 ENCOUNTER — TELEPHONE (OUTPATIENT)
Dept: OBSTETRICS AND GYNECOLOGY | Facility: CLINIC | Age: 35
End: 2020-04-23

## 2020-04-23 VITALS — SYSTOLIC BLOOD PRESSURE: 110 MMHG | DIASTOLIC BLOOD PRESSURE: 72 MMHG | WEIGHT: 141.8 LBS | BODY MASS INDEX: 22.89 KG/M2

## 2020-04-23 DIAGNOSIS — O09.93 SUPERVISION OF HIGH RISK PREGNANCY IN THIRD TRIMESTER: Primary | ICD-10-CM

## 2020-04-23 DIAGNOSIS — F32.A DEPRESSION AFFECTING PREGNANCY: ICD-10-CM

## 2020-04-23 DIAGNOSIS — Z87.59 HISTORY OF POSTPARTUM HYPERTENSION: ICD-10-CM

## 2020-04-23 DIAGNOSIS — Z86.79 HISTORY OF POSTPARTUM HYPERTENSION: ICD-10-CM

## 2020-04-23 DIAGNOSIS — O99.333 TOBACCO SMOKING AFFECTING PREGNANCY IN THIRD TRIMESTER: ICD-10-CM

## 2020-04-23 DIAGNOSIS — Z3A.30 30 WEEKS GESTATION OF PREGNANCY: ICD-10-CM

## 2020-04-23 DIAGNOSIS — O99.340 DEPRESSION AFFECTING PREGNANCY: ICD-10-CM

## 2020-04-23 DIAGNOSIS — N89.8 VAGINAL DISCHARGE: ICD-10-CM

## 2020-04-23 LAB
CANDIDA ALBICANS: POSITIVE
GARDNERELLA VAGINALIS: NEGATIVE
T VAGINALIS DNA VAG QL PROBE+SIG AMP: NEGATIVE

## 2020-04-23 PROCEDURE — 87510 GARDNER VAG DNA DIR PROBE: CPT | Performed by: OBSTETRICS & GYNECOLOGY

## 2020-04-23 PROCEDURE — 87660 TRICHOMONAS VAGIN DIR PROBE: CPT | Performed by: OBSTETRICS & GYNECOLOGY

## 2020-04-23 PROCEDURE — 87480 CANDIDA DNA DIR PROBE: CPT | Performed by: OBSTETRICS & GYNECOLOGY

## 2020-04-23 PROCEDURE — 0502F SUBSEQUENT PRENATAL CARE: CPT | Performed by: OBSTETRICS & GYNECOLOGY

## 2020-04-23 NOTE — PROGRESS NOTES
"CC: Prenatal visit    Ligia Vargas is a 34 y.o.  at 30w3d.  Doing well.  No complaints.  Denies contractions, LOF, or VB.  Reports good FM.  Having some hip and back pain.  Considering BTL.  Thinks that she has a yeast infection again.    /72   Wt 64.3 kg (141 lb 12.8 oz)   LMP 2019 (Approximate)   BMI 22.89 kg/m²   Fundal Height (cm): 29 cm  Fetal Heart Rate: 146    2nd PREGNANCY Problems (from 19 to present)     Problem Noted Resolved    Depression affecting pregnancy 1/15/2020 by Ana Maria Graves MD No    Overview Signed 1/15/2020  1:01 PM by Ana Maria Graves MD     Depression/anxiety  No meds         Supervision of high risk pregnancy in third trimester 1/15/2020 by Ana Maria Graves MD No    Overview Addendum 2020  9:37 AM by Ana Maria Graves MD     A pos / Rubella imm / GBS unk  Dating: LMP = 1TUS (13wk)  Genetics: Declined  Tdap: 28wk  Flu: Declined  Anatomy: WNL, BOY \"Shen Serra\"  1h Glucola: 28wk  H&H/Plts: 28wk  Lab Results   Component Value Date    HGB 13.3 2019    HCT 37.6 2019     2019   Breast vs Bottle / BC undecided         History of postpartum hypertension 2019 by Frida Sims APRN No    Overview Signed 3/5/2020  7:47 AM by Ana Maria Graves MD     PP GHTN; was on labetalol and nifedipine         Tobacco smoking affecting pregnancy in third trimester 2019 by Frida Sims APRN No    Overview Signed 3/5/2020  7:48 AM by Ana Maria Graves MD     Encouraged cessation             A/P: Ligia Vargas is a 34 y.o.  at 30w3d.  - RTC in 2 weeks  - BTL MA consents signed  - Will try to get pregnancy belt  - Reviewed COVID-19 visitation policy  - Reviewed COVID-19 precautions     Diagnosis Plan   1. Supervision of high risk pregnancy in third trimester     2. Depression affecting pregnancy     3. History of " postpartum hypertension     4. Tobacco smoking affecting pregnancy in third trimester     5. Vaginal discharge  Gardnerella vaginalis, Trichomonas vaginalis, Candida albicans, DNA - Swab, Vagina   6. 30 weeks gestation of pregnancy       Ana Maria Graves MD  4/23/2020  09:37

## 2020-04-23 NOTE — TELEPHONE ENCOUNTER
----- Message from Ana Maria Graves MD sent at 4/23/2020 12:44 PM CDT -----  Please let her know +yeast.  Please send in Diflucan 150mg q3d x2 doses.

## 2020-04-27 ENCOUNTER — TELEPHONE (OUTPATIENT)
Dept: OBSTETRICS AND GYNECOLOGY | Facility: CLINIC | Age: 35
End: 2020-04-27

## 2020-04-27 RX ORDER — FLUCONAZOLE 150 MG/1
TABLET ORAL
Qty: 2 TABLET | Refills: 0 | Status: SHIPPED | OUTPATIENT
Start: 2020-04-27 | End: 2020-05-07

## 2020-05-07 ENCOUNTER — ROUTINE PRENATAL (OUTPATIENT)
Dept: OBSTETRICS AND GYNECOLOGY | Facility: CLINIC | Age: 35
End: 2020-05-07

## 2020-05-07 VITALS — BODY MASS INDEX: 23.21 KG/M2 | WEIGHT: 143.8 LBS | SYSTOLIC BLOOD PRESSURE: 108 MMHG | DIASTOLIC BLOOD PRESSURE: 64 MMHG

## 2020-05-07 DIAGNOSIS — Z86.79 HISTORY OF POSTPARTUM HYPERTENSION: ICD-10-CM

## 2020-05-07 DIAGNOSIS — O99.340 DEPRESSION AFFECTING PREGNANCY: ICD-10-CM

## 2020-05-07 DIAGNOSIS — O09.93 SUPERVISION OF HIGH RISK PREGNANCY IN THIRD TRIMESTER: Primary | ICD-10-CM

## 2020-05-07 DIAGNOSIS — O26.893 PREGNANCY RELATED HIP PAIN IN THIRD TRIMESTER, ANTEPARTUM: ICD-10-CM

## 2020-05-07 DIAGNOSIS — Z3A.32 32 WEEKS GESTATION OF PREGNANCY: ICD-10-CM

## 2020-05-07 DIAGNOSIS — O99.333 TOBACCO SMOKING AFFECTING PREGNANCY IN THIRD TRIMESTER: ICD-10-CM

## 2020-05-07 DIAGNOSIS — Z87.59 HISTORY OF POSTPARTUM HYPERTENSION: ICD-10-CM

## 2020-05-07 DIAGNOSIS — F32.A DEPRESSION AFFECTING PREGNANCY: ICD-10-CM

## 2020-05-07 DIAGNOSIS — M25.559 PREGNANCY RELATED HIP PAIN IN THIRD TRIMESTER, ANTEPARTUM: ICD-10-CM

## 2020-05-07 PROCEDURE — 0502F SUBSEQUENT PRENATAL CARE: CPT | Performed by: OBSTETRICS & GYNECOLOGY

## 2020-05-07 NOTE — PROGRESS NOTES
"CC: Prenatal visit    Ligia Vargas is a 34 y.o.  at 32w3d.  Doing well other than salt craving.  Denies contractions, LOF, or VB.  Reports good FM.  She continues to have hip pain despite use of a pregnancy support belt.    /64   Wt 65.2 kg (143 lb 12.8 oz)   LMP 2019 (Approximate)   BMI 23.21 kg/m²   Fundal Height (cm): 31 cm  Fetal Heart Rate: 138    2nd PREGNANCY Problems (from 19 to present)     Problem Noted Resolved    Depression affecting pregnancy 1/15/2020 by Ana Maria Graves MD No    Overview Signed 1/15/2020  1:01 PM by Ana Maria Graves MD     Depression/anxiety  No meds         Supervision of high risk pregnancy in third trimester 1/15/2020 by Ana Maria Graves MD No    Overview Addendum 2020  9:37 AM by Ana Maria Graves MD     A pos / Rubella imm / GBS unk  Dating: LMP = 1TUS (13wk)  Genetics: Declined  Tdap: 28wk  Flu: Declined  Anatomy: WNL, BOY \"Shen Ponce\"  1h Glucola: 28wk  H&H/Plts: 28wk  Lab Results   Component Value Date    HGB 13.3 2019    HCT 37.6 2019     2019   Breast vs Bottle / BC undecided         History of postpartum hypertension 2019 by Frida Sims APRN No    Overview Signed 3/5/2020  7:47 AM by Ana Maria Graves MD     PP GHTN; was on labetalol and nifedipine         Tobacco smoking affecting pregnancy in third trimester 2019 by Frida Sims APRN No    Overview Signed 3/5/2020  7:48 AM by Ana Maria Graves MD     Encouraged cessation             A/P: Ligia Vargas is a 34 y.o.  at 32w3d.  - RTC in 2 weeks  - Reviewed COVID-19 visitation policy  - Reviewed COVID-19 precautions     Diagnosis Plan   1. Supervision of high risk pregnancy in third trimester     2. Depression affecting pregnancy     3. History of postpartum hypertension     4. Tobacco smoking affecting pregnancy in third " trimester     5. 32 weeks gestation of pregnancy     6. Pregnancy related hip pain in third trimester, antepartum  Continue pregnancy support belt.  Advised Tylenol, heating pad, etc.  Physical therapy has not restarted from COVID pandemic.  Recommended chiropractor.     Ana Maria Graves MD  5/7/2020  13:26

## 2020-05-21 ENCOUNTER — ROUTINE PRENATAL (OUTPATIENT)
Dept: OBSTETRICS AND GYNECOLOGY | Facility: CLINIC | Age: 35
End: 2020-05-21

## 2020-05-21 VITALS — DIASTOLIC BLOOD PRESSURE: 64 MMHG | WEIGHT: 149.8 LBS | SYSTOLIC BLOOD PRESSURE: 112 MMHG | BODY MASS INDEX: 24.18 KG/M2

## 2020-05-21 DIAGNOSIS — F32.A DEPRESSION AFFECTING PREGNANCY: ICD-10-CM

## 2020-05-21 DIAGNOSIS — Z87.59 HISTORY OF POSTPARTUM HYPERTENSION: ICD-10-CM

## 2020-05-21 DIAGNOSIS — Z86.79 HISTORY OF POSTPARTUM HYPERTENSION: ICD-10-CM

## 2020-05-21 DIAGNOSIS — O09.93 SUPERVISION OF HIGH RISK PREGNANCY IN THIRD TRIMESTER: Primary | ICD-10-CM

## 2020-05-21 DIAGNOSIS — O99.333 TOBACCO SMOKING AFFECTING PREGNANCY IN THIRD TRIMESTER: ICD-10-CM

## 2020-05-21 DIAGNOSIS — O99.340 DEPRESSION AFFECTING PREGNANCY: ICD-10-CM

## 2020-05-21 DIAGNOSIS — Z3A.34 34 WEEKS GESTATION OF PREGNANCY: ICD-10-CM

## 2020-05-21 PROCEDURE — 0502F SUBSEQUENT PRENATAL CARE: CPT | Performed by: OBSTETRICS & GYNECOLOGY

## 2020-05-21 NOTE — PROGRESS NOTES
"CC: Prenatal visit    Ligia Vargas is a 35 y.o.  at 34w3d.  Doing well.  Denies contractions, LOF, or VB.  Reports good FM.  She states that belt sometimes helps and sometimes doesn't so she does not wear it consistently.     /64   Wt 67.9 kg (149 lb 12.8 oz)   LMP 2019 (Approximate)   BMI 24.18 kg/m²   Fundal Height (cm): 34 cm  Fetal Heart Rate: 144    2nd PREGNANCY Problems (from 19 to present)     Problem Noted Resolved    Depression affecting pregnancy 1/15/2020 by Ana Maria Graves MD No    Overview Signed 1/15/2020  1:01 PM by Ana Maria Graves MD     Depression/anxiety  No meds         Supervision of high risk pregnancy in third trimester 1/15/2020 by Ana Maria Graves MD No    Overview Addendum 2020  9:37 AM by Ana Maria Graves MD     A pos / Rubella imm / GBS unk  Dating: LMP = 1TUS (13wk)  Genetics: Declined  Tdap: 28wk  Flu: Declined  Anatomy: WNL, BOY \"Shen Ponce\"  1h Glucola: 28wk  H&H/Plts: 28wk  Lab Results   Component Value Date    HGB 13.3 2019    HCT 37.6 2019     2019   Breast vs Bottle / BC undecided         History of postpartum hypertension 2019 by Frida Sims APRN No    Overview Signed 3/5/2020  7:47 AM by Ana Maria Graves MD     PP GHTN; was on labetalol and nifedipine         Tobacco smoking affecting pregnancy in third trimester 2019 by Frida Sims APRN No    Overview Signed 3/5/2020  7:48 AM by Ana Maria Graves MD     Encouraged cessation             A/P: Ligia Vargas is a 35 y.o.  at 34w3d.  - RTC in 2 weeks; GBS and BSUS at next visit  - Hip pain: May see a chiropractor or have a massage once they open.  - Reviewed COVID-19 visitation policy  - Reviewed COVID-19 precautions     Diagnosis Plan   1. Supervision of high risk pregnancy in third trimester     2. Depression affecting " pregnancy     3. History of postpartum hypertension     4. Tobacco smoking affecting pregnancy in third trimester     5. 34 weeks gestation of pregnancy       Ana Maria Graves MD  5/21/2020  16:24

## 2020-06-02 DIAGNOSIS — O99.891 BACK PAIN AFFECTING PREGNANCY IN THIRD TRIMESTER: Primary | ICD-10-CM

## 2020-06-02 DIAGNOSIS — M54.9 BACK PAIN AFFECTING PREGNANCY IN THIRD TRIMESTER: Primary | ICD-10-CM

## 2020-06-03 ENCOUNTER — TELEPHONE (OUTPATIENT)
Dept: OBSTETRICS AND GYNECOLOGY | Facility: CLINIC | Age: 35
End: 2020-06-03

## 2020-06-04 ENCOUNTER — ROUTINE PRENATAL (OUTPATIENT)
Dept: OBSTETRICS AND GYNECOLOGY | Facility: CLINIC | Age: 35
End: 2020-06-04

## 2020-06-04 ENCOUNTER — HOSPITAL ENCOUNTER (OUTPATIENT)
Dept: PHYSICAL THERAPY | Facility: HOSPITAL | Age: 35
Setting detail: THERAPIES SERIES
Discharge: HOME | End: 2020-06-04

## 2020-06-04 VITALS — BODY MASS INDEX: 24.69 KG/M2 | DIASTOLIC BLOOD PRESSURE: 64 MMHG | WEIGHT: 153 LBS | SYSTOLIC BLOOD PRESSURE: 118 MMHG

## 2020-06-04 DIAGNOSIS — F32.A DEPRESSION AFFECTING PREGNANCY: ICD-10-CM

## 2020-06-04 DIAGNOSIS — O99.340 DEPRESSION AFFECTING PREGNANCY: ICD-10-CM

## 2020-06-04 DIAGNOSIS — O09.93 SUPERVISION OF HIGH RISK PREGNANCY IN THIRD TRIMESTER: Primary | ICD-10-CM

## 2020-06-04 DIAGNOSIS — M54.9 BACK PAIN IN PREGNANCY: Primary | ICD-10-CM

## 2020-06-04 DIAGNOSIS — Z86.79 HISTORY OF POSTPARTUM HYPERTENSION: ICD-10-CM

## 2020-06-04 DIAGNOSIS — Z87.59 HISTORY OF POSTPARTUM HYPERTENSION: ICD-10-CM

## 2020-06-04 DIAGNOSIS — Z3A.36 36 WEEKS GESTATION OF PREGNANCY: ICD-10-CM

## 2020-06-04 DIAGNOSIS — O99.333 TOBACCO SMOKING AFFECTING PREGNANCY IN THIRD TRIMESTER: ICD-10-CM

## 2020-06-04 DIAGNOSIS — O99.891 BACK PAIN IN PREGNANCY: Primary | ICD-10-CM

## 2020-06-04 PROCEDURE — 87653 STREP B DNA AMP PROBE: CPT | Performed by: OBSTETRICS & GYNECOLOGY

## 2020-06-04 PROCEDURE — 97110 THERAPEUTIC EXERCISES: CPT | Performed by: PHYSICAL THERAPIST

## 2020-06-04 PROCEDURE — 97162 PT EVAL MOD COMPLEX 30 MIN: CPT | Performed by: PHYSICAL THERAPIST

## 2020-06-04 PROCEDURE — 0502F SUBSEQUENT PRENATAL CARE: CPT | Performed by: OBSTETRICS & GYNECOLOGY

## 2020-06-04 RX ORDER — PROMETHAZINE HYDROCHLORIDE 25 MG/ML
12.5 INJECTION, SOLUTION INTRAMUSCULAR; INTRAVENOUS
Status: CANCELLED | OUTPATIENT
Start: 2020-06-04

## 2020-06-04 RX ORDER — SODIUM CHLORIDE, SODIUM LACTATE, POTASSIUM CHLORIDE, CALCIUM CHLORIDE 600; 310; 30; 20 MG/100ML; MG/100ML; MG/100ML; MG/100ML
125 INJECTION, SOLUTION INTRAVENOUS CONTINUOUS
Status: CANCELLED | OUTPATIENT
Start: 2020-06-04

## 2020-06-04 RX ORDER — PROMETHAZINE HYDROCHLORIDE 25 MG/1
12.5 SUPPOSITORY RECTAL EVERY 6 HOURS PRN
Status: CANCELLED | OUTPATIENT
Start: 2020-06-04

## 2020-06-04 RX ORDER — LIDOCAINE HYDROCHLORIDE 10 MG/ML
5 INJECTION, SOLUTION EPIDURAL; INFILTRATION; INTRACAUDAL; PERINEURAL AS NEEDED
Status: CANCELLED | OUTPATIENT
Start: 2020-06-04

## 2020-06-04 RX ORDER — CARBOPROST TROMETHAMINE 250 UG/ML
250 INJECTION, SOLUTION INTRAMUSCULAR AS NEEDED
Status: CANCELLED | OUTPATIENT
Start: 2020-06-04

## 2020-06-04 RX ORDER — ONDANSETRON 2 MG/ML
4 INJECTION INTRAMUSCULAR; INTRAVENOUS EVERY 6 HOURS PRN
Status: CANCELLED | OUTPATIENT
Start: 2020-06-04

## 2020-06-04 RX ORDER — METHYLERGONOVINE MALEATE 0.2 MG/ML
200 INJECTION INTRAVENOUS ONCE AS NEEDED
Status: CANCELLED | OUTPATIENT
Start: 2020-06-04

## 2020-06-04 RX ORDER — SODIUM CHLORIDE 0.9 % (FLUSH) 0.9 %
3 SYRINGE (ML) INJECTION EVERY 12 HOURS SCHEDULED
Status: CANCELLED | OUTPATIENT
Start: 2020-06-04

## 2020-06-04 RX ORDER — BUTORPHANOL TARTRATE 1 MG/ML
2 INJECTION, SOLUTION INTRAMUSCULAR; INTRAVENOUS
Status: CANCELLED | OUTPATIENT
Start: 2020-06-04

## 2020-06-04 RX ORDER — SODIUM CHLORIDE 0.9 % (FLUSH) 0.9 %
3-10 SYRINGE (ML) INJECTION AS NEEDED
Status: CANCELLED | OUTPATIENT
Start: 2020-06-04

## 2020-06-04 RX ORDER — OXYTOCIN 10 [USP'U]/ML
85 INJECTION, SOLUTION INTRAMUSCULAR; INTRAVENOUS ONCE
Status: CANCELLED | OUTPATIENT
Start: 2020-06-04

## 2020-06-04 RX ORDER — OXYTOCIN 10 [USP'U]/ML
650 INJECTION, SOLUTION INTRAMUSCULAR; INTRAVENOUS ONCE
Status: CANCELLED | OUTPATIENT
Start: 2020-06-04

## 2020-06-04 RX ORDER — MISOPROSTOL 100 UG/1
800 TABLET ORAL AS NEEDED
Status: CANCELLED | OUTPATIENT
Start: 2020-06-04

## 2020-06-04 RX ORDER — IBUPROFEN 200 MG
800 TABLET ORAL EVERY 8 HOURS
Status: CANCELLED | OUTPATIENT
Start: 2020-06-04

## 2020-06-04 RX ORDER — PROMETHAZINE HYDROCHLORIDE 25 MG/1
12.5 TABLET ORAL EVERY 6 HOURS PRN
Status: CANCELLED | OUTPATIENT
Start: 2020-06-04

## 2020-06-04 RX ORDER — ONDANSETRON 4 MG/1
4 TABLET, FILM COATED ORAL EVERY 6 HOURS PRN
Status: CANCELLED | OUTPATIENT
Start: 2020-06-04

## 2020-06-04 RX ORDER — MISOPROSTOL 100 UG/1
50 TABLET ORAL EVERY 6 HOURS SCHEDULED
Status: CANCELLED | OUTPATIENT
Start: 2020-06-04 | End: 2020-06-05

## 2020-06-04 RX ORDER — ACETAMINOPHEN 325 MG/1
1000 TABLET ORAL EVERY 8 HOURS
Status: CANCELLED | OUTPATIENT
Start: 2020-06-04

## 2020-06-04 NOTE — THERAPY EVALUATION
Outpatient Physical Therapy Pelvic Health Initial Evaluation  [facility]     Patient Name: Ligia Vargas  : 1985  MRN: [id number]  Today's Date: 2020        Visit Date: 2020   Visit Number:   Recheck: 20  Insurance: pending authorization      Patient Active Problem List   Diagnosis   • Primary fibromyalgia   • Depression   • Anxiety   • Bleeding hemorrhoids   • Asthma   • History of postpartum hypertension   • Tobacco smoking affecting pregnancy in third trimester   • Depression affecting pregnancy   • Supervision of high risk pregnancy in third trimester        Past Medical History:   Diagnosis Date   • Abnormal Pap smear of cervix     REPEAT PAP SMEAR NORMAL    • Acute otitis externa 10/14/2015   • Acute sinusitis 2015   • Allergic rhinitis 2015   • Anxiety 2016   • Asthma    • Bleeding hemorrhoids 2015   • Depression    • Endometriosis    • Headache 10/20/2015   • Impacted cerumen 10/14/2015   • Irregular menstruation 2015   • Irregular periods 2016   • Knee pain 10/06/2015   • Loss of hair 2016   • Nonvenomous insect bite 2016   • Pain in eye 10/20/2015   • Palpitations    • Paresthesia 2015   • PMS (premenstrual syndrome)    • Pregnancy     A0   • Primary fibromyalgia 2016   • Shoulder joint pain 10/20/2015   • Tremor 2015   • Urinary tract infection         Past Surgical History:   Procedure Laterality Date   • DIAGNOSTIC LAPAROSCOPY      DR. AGUERO    • ENDOSCOPY  2016    Normal   • MOUTH SURGERY      [place] teeth extraction   • WISDOM TOOTH EXTRACTION           Visit Dx:    ICD-10-CM ICD-9-CM   1. Back pain in pregnancy O99.89 646.80    M54.9 724.5           Pelvic Health     Row Name 20 1400             Pregnancy Questions    Number of Pregnancies  2  -SW      Number of Miscarriages  0  -SW      Has the patient had an ?  No  -SW      Number of Children  1 2.4 yo at home   -SW      How many weeks pregnant are you?  36 weeks  -SW      Type of Previous Deliveries  Vaginal episiotomy  -SW      Due Date  06/29/20  -SW         Pain Assessment    Pain Assessment  0-10  -SW      Pain Score  3  -SW      Post Pain Score  3  -SW      Pain Location  -- B hips and LB  -SW        User Key  (r) = Recorded By, (t) = Taken By, (c) = Cosigned By    Initials Name Provider Type    Alexandria Wolfe, PT DPT Physical Therapist        PT Ortho     Row Name 06/04/20 1400       Subjective Comments    Subjective Comments  I hurt mainly in my hips, LB and legs when on feet for a long period of time.  Onset of about 1 month.  No previous s/s with other pregnancy.  Pain is constant.  Patient reports radiation of pain into BLE to mid thigh.  Dec sleep but some due to son being Type 1 DM being very cautious with him.  Describes s/s as stiffness first thing in morning.  Worked at [employer] 8+ hrs per day.  Maternity leave started on Tuesday.  Chiropractor said I had scoliosis and DDD to LS about 3 years prior but nothing has been confirmed.    -SW       Subjective Pain    Able to rate subjective pain?  yes  -SW    Pre-Treatment Pain Level  3  -SW    Post-Treatment Pain Level  3  -SW       Posture/Observations    Posture- WNL  Posture is WNL  -SW    Posture/Observations Comments  Standing IC elevated on R side.  Shoulder and scapula elevated on R as well.  Supine R ant innom.  -SW       Quarter Clearing    Quarter Clearing  Lower Quarter Clearing  -SW       DTR- Lower Quarter Clearing    Patellar tendon (L2-4)  2- Normal response  -SW    Achilles tendon (S1-2)  2- Normal response  -SW       Neural Tension Signs- Lower Quarter Clearing    Slump  Negative  -SW    SLR  Negative  -SW       Sensory Screen for Light Touch- Lower Quarter Clearing    L1 (inguinal area)  Intact  -SW    L2 (anterior mid thigh)  Intact  -SW    L3 (distal anterior thigh)  Intact  -SW    L4 (medial lower leg/foot)  Intact  -SW    L5  (lateral lower leg/great toe)  Intact  -SW    S1 (bottom of foot)  Intact  -SW       Myotomal Screen- Lower Quarter Clearing    Hip flexion (L2)  5 (Normal)  -SW    Knee extension (L3)  5 (Normal)  -SW    Knee flexion (S2)  5 (Normal)  -SW       Lumbar ROM Screen- Lower Quarter Clearing    Lumbar Flexion  Normal limited only by habitus of fetus  -SW    Lumbar Extension  Normal  -SW    Lumbar Lateral Flexion  Normal  -SW    Lumbar Rotation  Normal  -SW       SI/Hip Screen- Lower Quarter Clearing    ASIS compression  Positive  -SW    Yuliana's/Marvin's test  Left:;Positive  -SW    Pain in Lara's area  Bilateral:;Positive  -SW       Special Tests/Palpation    Special Tests/Palpation  Lumbar/SI  -SW       Lumbosacral Accessory Motions    Lumbosacral Accessory Motions Tested?  Yes reduced AP glide throughout segmentally  -SW    PA glide- Sacral base  -- L sacral base elevated in prone position  -SW    Innominate rotation  -- R ant innom  -SW       Lumbosacral Palpation    Lumbosacral Palpation?  Yes  -    SI  Bilateral:;Tender;Guarded/taut  -SW    Lumbosacral Segment  Bilateral:;Tender;Guarded/taut  -SW    Piriformis  Left:;Guarded/taut;Trigger point  -    Erector Spinae (Paraspinals)  Bilateral:;Tender;Guarded/taut  -SW    Lumbosacral Palpation Comments  Patient shows increased tension throughout paraspinals muscles.  Trigger noted to L piriformis.  Dec segmental mobility to LS throughout.   -SW       General ROM    GENERAL ROM COMMENTS  functional all planes.   -SW       MMT (Manual Muscle Testing)    General MMT Comments  functional all planes.   -      User Key  (r) = Recorded By, (t) = Taken By, (c) = Cosigned By    Initials Name Provider Type    SW Alexandria Gonzalez, PT DPT Physical Therapist                     PT Assessment/Plan     Row Name 06/04/20 1500          PT Assessment    Functional Limitations  Performance in leisure activities;Performance in self-care ADL;Performance in work  activities;Limitation in home management;Limitations in community activities  -SW     Impairments  Impaired muscle length;Impaired postural alignment;Joint mobility;Pain;Poor body mechanics;Posture  -SW     Assessment Comments  Patient is a 34 yo female presenting with complaints of LB and hip pain during pregnancy.  Her clinical presentation seems that of musculoskeletal with evidence of scoliosis.  She would benefit from skilled therapy to address concerns and improve overall function.   -SW     Rehab Potential  Good  -SW     Patient/caregiver participated in establishment of treatment plan and goals  Yes  -SW     Patient would benefit from skilled therapy intervention  Yes  -SW        PT Plan    PT Frequency  1x/week  -SW     Predicted Duration of Therapy Intervention (Therapy Eval)  6 visits  -SW     PT Plan Comments  Manual therapy for alignment assist and muscle release as appropriate.  Ther ex for stretching and flexibility along with stabilization and postural control exercises.  Body mechanics as needed to assist with ADL function.   -SW       User Key  (r) = Recorded By, (t) = Taken By, (c) = Cosigned By    Initials Name Provider Type    Alexandria Wolfe, PT DPT Physical Therapist            OP Exercises     Row Name 06/04/20 1400             Subjective Comments    Subjective Comments  I hurt mainly in my hips, LB and legs when on feet for a long period of time.  Onset of about 1 month.  No previous s/s with other pregnancy.  Pain is constant.  Patient reports radiation of pain into BLE to mid thigh.  Dec sleep but some due to son being Type 1 DM being very cautious with him.  Describes s/s as stiffness first thing in morning.  Worked at [employer] 8+ hrs per day.  Maternity leave started on Tuesday.  Chiropractor said I had scoliosis and DDD to LS about 3 years prior but nothing has been confirmed.    -SW         Subjective Pain    Able to rate subjective pain?  yes  -SW      Pre-Treatment Pain  Level  3  -SW      Post-Treatment Pain Level  3  -SW         Exercise 1    Exercise Name 1  angry cat stretch   -SW      Reps 1  10  -SW      Time 1  5 sec  -SW         Exercise 2    Exercise Name 2  piriformis stretch   -SW      Reps 2  3  -SW      Time 2  30 sec  -SW         Exercise 3    Exercise Name 3  positonal sleep postures.   -SW      Additional Comments  towel roll for support with understanding verbalized.   -SW         Exercise 4    Exercise Name 4  seated TA and PF engagement  -SW      Reps 4  10  -SW      Time 4  5 sec  -SW      Additional Comments  Add UE flexion for additions to HEP x 20 ea.   -SW         Exercise 5    Exercise Name 5  seated TA and PF with Leg extensions  -SW      Reps 5  10  -SW      Time 5  5 sec  -SW        User Key  (r) = Recorded By, (t) = Taken By, (c) = Cosigned By    Initials Name Provider Type    Alexandria Wolfe, PT DPT Physical Therapist                      PT OP Goals     Row Name 06/04/20 1500          PT Short Term Goals    STG Date to Achieve  07/02/20  -     STG 1  patient to be independent and compliant with HEP daily as instructed to assist with general mobility and pain reduction.   -SW     STG 1 Progress  New  -     STG 2  Patient to report dec pain to mild vs moderately severe as reported with evaluation.  -     STG 2 Progress  New  -     STG 3  patient to perform log roll with spinal neutral mechanics as to improve transfers with less pain.  -     STG 3 Progress  New  -     STG 4  patient to show reduced trigger to piriformis muscle such that no palpational tenderness is reported.   -     STG 4 Progress  New  -        Long Term Goals    LTG Date to Achieve  09/04/20  -     LTG 1  Patient subjectively improve 70% better overall.   -     LTG 1 Progress  New  -     LTG 2  Patient to care give for son without restrictions or limitations of function or pain.  -     LTG 2 Progress  New  -     LTG 3  Patient to tolerate 6+ hours of  sleep with improved positioning and muscle relaxation  -     LTG 3 Progress  New  -SW     LTG 4  Patient to improve segmental mobility of LS such that no pain with AP glides and ROM is functional without pain.  -     LTG 4 Progress  New  -        Time Calculation    PT Goal Re-Cert Due Date  07/02/20  -       User Key  (r) = Recorded By, (t) = Taken By, (c) = Cosigned By    Initials Name Provider Type    Alexandria Wolfe, PT DPT Physical Therapist          Therapy Education  Given: HEP  Program: New  How Provided: Verbal, Demonstration, Written  Provided to: Patient  Level of Understanding: Teach back education performed, Verbalized, Demonstrated               Time Calculation:   Start Time: 1410  Stop Time: 1505  Time Calculation (min): 55 min  Therapy Charges for Today     Code Description Service Date Service Provider Modifiers Qty    76537963047 HC PT EVAL MOD COMPLEXITY 3 6/4/2020 Alexandria Gonzalez, PT DPT GP 1    19450602553 HC PT THER PROC EA 15 MIN 6/4/2020 Alexandria Gonzalez, PT DPT GP 1                  Alexandria Gonzalez PT DPT  6/4/2020

## 2020-06-04 NOTE — H&P
"HISTORY & PHYSICAL - Obstetrics  [facility]    Name: Ligia Vargas  MRN: [id number]  Location: Room/bed info not found  Date: 2020  CSN: [id number]      CHIEF COMPLAINT:  \"I want to be induced\"    HISTORY OF PRESENT ILLNESS  Ligia Vargas is a 35 y.o.  at 36w3d gestational age by LMP = 1TUS suggesting Estimated Date of Delivery: 20.  The patient presents for RPN and requests to be induced.  Today denies LOF, vaginal bleeding, or contractions.  Reports good FM.    Patient denies any chest pain, palpitations, headaches, lightheadedness, shortness of breath, cough, nausea, vomiting, diarrhea, constipation, fever, or chills.    ROS  Review of Systems   Constitutional: Negative.    HENT: Negative.    Eyes: Negative.    Respiratory: Negative.    Cardiovascular: Negative.    Gastrointestinal: Negative.    Endocrine: Negative.    Genitourinary: Negative.    Musculoskeletal: Negative.    Skin: Negative.    Allergic/Immunologic: Negative.    Neurological: Negative.    Hematological: Negative.    Psychiatric/Behavioral: Negative.      PRENATAL LAB RESULTS  Prenatal labs reviewed  External Prenatal Results     Pregnancy Outside Results - Transcribed From Office Records - See Scanned Records For Details     Test Value Date Time    Hgb 11.3 g/dL 20 1632      10.9 g/dL 20 0950      13.3 g/dL 19 1434    Hct 31.7 % 20 1632      29.8 % 20 0950      37.6 % 19 1434    ABO A  19 1434    Rh Positive  19 1434    Antibody Screen Negative  19 1434    Glucose Fasting GTT       Glucose Tolerance Test 1 hour       Glucose Tolerance Test 3 hour       Gonorrhea (discrete) Negative  19 1434    Chlamydia (discrete) Negative  19 1434    RPR Non-Reactive  19 1434    VDRL       Syphilis Antibody       Rubella Positive  19 1434    HBsAg Non-Reactive  19 1434    Herpes Simplex Virus PCR       Herpes Simplex VIrus " "Culture       HIV Non-Reactive  11/25/19 1434    Hep C RNA Quant PCR       Hep C Antibody Non-Reactive  11/25/19 1434    AFP 51.4 ng/mL 04/18/17 1004    Group B Strep Negative  08/24/17 1405    GBS Susceptibility to Clindamycin       GBS Susceptibility to Erythromycin       Fetal Fibronectin       Genetic Testing, Maternal Blood             Drug Screening     Test Value Date Time    Urine Drug Screen       Amphetamine Screen Negative  11/25/19 1434    Barbiturate Screen Negative  11/25/19 1434    Benzodiazepine Screen Negative  11/25/19 1434    Methadone Screen Negative  11/25/19 1434    Phencyclidine Screen Negative  11/25/19 1434    Opiates Screen Negative  11/25/19 1434    THC Screen Negative  11/25/19 1434    Cocaine Screen       Propoxyphene Screen Negative  11/25/19 1434    Buprenorphine Screen Negative  11/25/19 1434    Methamphetamine Screen       Oxycodone Screen Negative  11/25/19 1434    Tricyclic Antidepressants Screen Negative  11/25/19 1434              PRENATAL RISK FACTORS  2nd PREGNANCY Problems (from 11/25/19 to present)     Problem Noted Resolved    Depression affecting pregnancy 1/15/2020 by Ana Maria Graves MD No    Overview Signed 1/15/2020  1:01 PM by Ana Maria Graves MD     Depression/anxiety  No meds         Supervision of high risk pregnancy in third trimester 1/15/2020 by Ana Maria Graves MD No    Overview Addendum 4/23/2020  9:37 AM by Ana Maria Graves MD     A pos / Rubella imm / GBS unk  Dating: LMP = 1TUS (13wk)  Genetics: Declined  Tdap: 28wk  Flu: Declined  Anatomy: WNL, BOY \"Shen Ponce\"  1h Glucola: 28wk  H&H/Plts: 28wk  Lab Results   Component Value Date    HGB 13.3 11/25/2019    HCT 37.6 11/25/2019     11/25/2019   Breast vs Bottle / BC undecided         History of postpartum hypertension 11/25/2019 by Frida Sims APRN No    Overview Signed 3/5/2020  7:47 AM by Ana Maria Graves MD     PP " GHTN; was on labetalol and nifedipine         Tobacco smoking affecting pregnancy in third trimester 2019 by Frida Sims APRN No    Overview Signed 3/5/2020  7:48 AM by Ana Maria Graves MD     Encouraged cessation             OB HISTORY  OB History    Para Term  AB Living   2 1 1     1   SAB TAB Ectopic Molar Multiple Live Births           0 1      # Outcome Date GA Lbr Azam/2nd Weight Sex Delivery Anes PTL Lv   2 Current            1 Term 17 40w0d / 00:19 3430 g (7 lb 9 oz) M Vag-Spont None N IRAIS     GYN HISTORY  Denies h/o sexually transmitted infections/pelvic inflammatory disease  Denies h/o gynecologic surgeries, including biopsies of the cervix    PAST MEDICAL HISTORY  Past Medical History:   Diagnosis Date   • Abnormal Pap smear of cervix     REPEAT PAP SMEAR NORMAL    • Acute otitis externa 10/14/2015   • Acute sinusitis 2015   • Allergic rhinitis 2015   • Anxiety 2016   • Asthma    • Bleeding hemorrhoids 2015   • Depression    • Endometriosis    • Headache 10/20/2015   • Impacted cerumen 10/14/2015   • Irregular menstruation 2015   • Irregular periods 2016   • Knee pain 10/06/2015   • Loss of hair 2016   • Nonvenomous insect bite 2016   • Pain in eye 10/20/2015   • Palpitations    • Paresthesia 2015   • PMS (premenstrual syndrome)    • Pregnancy     A0   • Primary fibromyalgia 2016   • Shoulder joint pain 10/20/2015   • Tremor 2015   • Urinary tract infection      PAST SURGICAL HISTORY  Past Surgical History:   Procedure Laterality Date   • DIAGNOSTIC LAPAROSCOPY      DR. AGUERO    • ENDOSCOPY  2016    Normal   • MOUTH SURGERY      [place] teeth extraction   • WISDOM TOOTH EXTRACTION       FAMILY HISTORY  Family History   Problem Relation Age of Onset   • Hypertension Mother    • Hypertension Father    • Migraines Father    • Hypertension Other    • Asthma Brother    •  Migraines Brother    • Heart disease Sister    • Hypertension Paternal Grandmother    • Heart attack Maternal Grandmother    • Hypertension Maternal Grandmother    • Cancer Maternal Grandfather    • Cancer Maternal Uncle    • Cancer Paternal Uncle      SOCIAL HISTORY  Social History     Socioeconomic History   • Marital status:      Spouse name: Not on file   • Number of children: Not on file   • Years of education: Not on file   • Highest education level: Not on file   Tobacco Use   • Smoking status: Current Every Day Smoker     Packs/day: 0.50     Years: 14.00     Pack years: 7.00     Types: Cigarettes     Last attempt to quit: 1/27/2017     Years since quitting: 3.3   • Smokeless tobacco: Never Used   • Tobacco comment: smokes less than a half a pack a day    Substance and Sexual Activity   • Alcohol use: No     Comment: Occassionally pre pregnancy   • Drug use: No   • Sexual activity: Not Currently     Partners: Male     Comment: wants OCP      ALLERGIES  Allergies   Allergen Reactions   • Fish-Derived Products Nausea And Vomiting   • Adhesive Tape Itching and Rash   • Cymbalta [Duloxetine Hcl] Anxiety     HOME MEDICATIONS  Prior to Admission medications    Medication Sig Start Date End Date Taking? Authorizing Provider   docusate sodium (COLACE) 100 MG capsule Take 1 capsule by mouth 2 (Two) Times a Day. 11/25/19  Yes Frida Sims APRN   loratadine (CLARITIN) 5 MG/5ML syrup Take 10 mL by mouth Daily. 3/9/20  Yes Roya Chowdhury APRN   metoclopramide (REGLAN) 5 MG tablet Take 1 tablet by mouth 3 (Three) Times a Day As Needed (nausea or vomiting). 11/14/19  Yes Wilber Trujillo MD   Prenatal Vit-Fe Fumarate-FA (PRENATAL COMPLETE PO) Take  by mouth Daily.   Yes Emergency, Nurse Epic, RN     PHYSICAL EXAM  /64   Wt 69.4 kg (153 lb)   LMP 09/23/2019 (Approximate)   BMI 24.69 kg/m²   General: No acute distress. Well developed, well nourished. Pleasant.  Heart: Regular rate and  rhythm. No murmurs, rubs, or gallops  Lungs: Clear to auscultation bilaterally. No wheezes, rales, or rhonchi.  Abdomen: Soft, nontender to palpation, enlarged by gravid uterus.    SVE: 1/ thick/ -3/ soft/posterior     Beside Ultrasound:  Presenting part: cephalic    IMPRESSION  Ligia Vargas is a 35 y.o.  at 36w3d scheduled for EIOL at 39w4d.  Will require cervical ripening.    PLAN  1.  IUP at 39w4d with EIOL  - Admit: Labor and Delivery  - Attending: Dr. Graves  - Condition: Stable  - Vitals: per protocol  - Activity: ad allen  - Nursing: Continuous electronic fetal monitoring, as per protocol  - Diet: Clears  - IV fluids:  mL/hr  - Meds: SL Cytotec  - Allergies: NKDA  - Labs: CBC, T&S, UDS  - GBS: pending.  Antibiotics:  pending  - Ligia Vargas and I have discussed pain goals for this hospitalization after reviewing her current clinical condition, medical history and prior pain experiences.  The goal is to keep her pain level appropriate.  Patient does desire an epidural  - Anticipate     This document has been electronically signed by Ana Maria Graves MD on 2020 16:17.

## 2020-06-04 NOTE — PROGRESS NOTES
"CC: Prenatal visit    Ligia Vargas is a 35 y.o.  at 36w3d.  Doing well.  Denies contractions, LOF, or VB.  Reports good FM.    /64   Wt 69.4 kg (153 lb)   LMP 2019 (Approximate)   BMI 24.69 kg/m²   SVE: 1/thick/high/soft/posterior  BSUS: cephalic  Fundal Height (cm): 36 cm  Fetal Heart Rate: 142    2nd PREGNANCY Problems (from 19 to present)     Problem Noted Resolved    Depression affecting pregnancy 1/15/2020 by Ana Maria Graves MD No    Overview Signed 1/15/2020  1:01 PM by Ana Maria Graves MD     Depression/anxiety  No meds         Supervision of high risk pregnancy in third trimester 1/15/2020 by Ana Maria Graves MD No    Overview Addendum 2020  9:37 AM by Ana Maria Graves MD     A pos / Rubella imm / GBS unk  Dating: LMP = 1TUS (13wk)  Genetics: Declined  Tdap: 28wk  Flu: Declined  Anatomy: WNL, BOY \"Shen Ponce\"  1h Glucola: 28wk  H&H/Plts: 28wk  Lab Results   Component Value Date    HGB 13.3 2019    HCT 37.6 2019     2019   Breast vs Bottle / BC undecided         History of postpartum hypertension 2019 by Frida Sims, CATHERINE No    Overview Signed 3/5/2020  7:47 AM by Ana Maria Graves MD     PP GHTN; was on labetalol and nifedipine         Tobacco smoking affecting pregnancy in third trimester 2019 by Frida Sims, CATHERINE No    Overview Signed 3/5/2020  7:48 AM by Ana Maria Graves MD     Encouraged cessation             A/P: Ligia Vargas is a 35 y.o.  at 36w3d.  - RTC in 1 week  - GBS collected today  - Desires EIOL; scheduled for , Cytotec  - Reviewed COVID-19 visitation policy  - Reviewed COVID-19 precautions     Diagnosis Plan   1. Supervision of high risk pregnancy in third trimester     2. Depression affecting pregnancy     3. History of postpartum hypertension     4. Tobacco smoking affecting " pregnancy in third trimester     5. 36 weeks gestation of pregnancy  Group B Strep (Molecular) - Swab, Vaginal/Rectum     Ana Maria Graves MD  6/4/2020  16:15

## 2020-06-05 LAB — GROUP B STREP, DNA: NEGATIVE

## 2020-06-11 ENCOUNTER — HOSPITAL ENCOUNTER (OUTPATIENT)
Dept: PHYSICAL THERAPY | Facility: HOSPITAL | Age: 35
Setting detail: THERAPIES SERIES
Discharge: HOME | End: 2020-06-11

## 2020-06-11 ENCOUNTER — ROUTINE PRENATAL (OUTPATIENT)
Dept: OBSTETRICS AND GYNECOLOGY | Facility: CLINIC | Age: 35
End: 2020-06-11

## 2020-06-11 VITALS — SYSTOLIC BLOOD PRESSURE: 105 MMHG | DIASTOLIC BLOOD PRESSURE: 67 MMHG | BODY MASS INDEX: 24.69 KG/M2 | WEIGHT: 153 LBS

## 2020-06-11 DIAGNOSIS — F32.A DEPRESSION AFFECTING PREGNANCY: ICD-10-CM

## 2020-06-11 DIAGNOSIS — N89.8 VAGINAL DISCHARGE: ICD-10-CM

## 2020-06-11 DIAGNOSIS — Z86.79 HISTORY OF POSTPARTUM HYPERTENSION: ICD-10-CM

## 2020-06-11 DIAGNOSIS — Z3A.37 37 WEEKS GESTATION OF PREGNANCY: Primary | ICD-10-CM

## 2020-06-11 DIAGNOSIS — O99.333 TOBACCO SMOKING AFFECTING PREGNANCY IN THIRD TRIMESTER: ICD-10-CM

## 2020-06-11 DIAGNOSIS — Z87.59 HISTORY OF POSTPARTUM HYPERTENSION: ICD-10-CM

## 2020-06-11 DIAGNOSIS — O99.891 BACK PAIN IN PREGNANCY: Primary | ICD-10-CM

## 2020-06-11 DIAGNOSIS — O99.340 DEPRESSION AFFECTING PREGNANCY: ICD-10-CM

## 2020-06-11 DIAGNOSIS — M54.9 BACK PAIN IN PREGNANCY: Primary | ICD-10-CM

## 2020-06-11 DIAGNOSIS — O09.93 SUPERVISION OF HIGH RISK PREGNANCY IN THIRD TRIMESTER: ICD-10-CM

## 2020-06-11 LAB
CANDIDA ALBICANS: NEGATIVE
GARDNERELLA VAGINALIS: NEGATIVE
T VAGINALIS DNA VAG QL PROBE+SIG AMP: NEGATIVE

## 2020-06-11 PROCEDURE — 87480 CANDIDA DNA DIR PROBE: CPT | Performed by: NURSE PRACTITIONER

## 2020-06-11 PROCEDURE — 0502F SUBSEQUENT PRENATAL CARE: CPT | Performed by: NURSE PRACTITIONER

## 2020-06-11 PROCEDURE — 97110 THERAPEUTIC EXERCISES: CPT | Performed by: PHYSICAL THERAPIST

## 2020-06-11 PROCEDURE — 87660 TRICHOMONAS VAGIN DIR PROBE: CPT | Performed by: NURSE PRACTITIONER

## 2020-06-11 PROCEDURE — 97140 MANUAL THERAPY 1/> REGIONS: CPT | Performed by: PHYSICAL THERAPIST

## 2020-06-11 PROCEDURE — 87510 GARDNER VAG DNA DIR PROBE: CPT | Performed by: NURSE PRACTITIONER

## 2020-06-11 NOTE — THERAPY TREATMENT NOTE
Outpatient Physical Therapy Pelvic Health Treatment Note  [facility]     Patient Name: Ligia Vargas  : 1985  MRN: [id number]  Today's Date: 2020        Visit Date: 2020   Visit Number:   Recheck: 20  Insurance: 60 v per year    Visit Dx:    ICD-10-CM ICD-9-CM   1. Back pain in pregnancy O99.89 646.80    M54.9 724.5       Patient Active Problem List   Diagnosis   • Primary fibromyalgia   • Depression   • Anxiety   • Bleeding hemorrhoids   • Asthma   • History of postpartum hypertension   • Tobacco smoking affecting pregnancy in third trimester   • Depression affecting pregnancy   • Supervision of high risk pregnancy in third trimester        Pelvic Health     Row Name 20 1300             Pregnancy Questions    Number of Children  1 2.6 yo at home  -SW      Type of Previous Deliveries  Vaginal episiotomy  -SW      Due Date  20  -SW        User Key  (r) = Recorded By, (t) = Taken By, (c) = Cosigned By    Initials Name Provider Type    Alexandria Wolfe, PT DPT Physical Therapist        PT Ortho     Row Name 20 1300       Subjective Comments    Subjective Comments  Patient had follow up with MD.  Got checked so stirred it up a bit.  Next week is last appt.  Then will induce if no spontaneous delivery.  Scheduled for 20 induction if no progress.  Felt a little better with last visit.  But stirred up after appt.   -SW       Subjective Pain    Able to rate subjective pain?  yes  -SW    Pre-Treatment Pain Level  3  -SW    Post-Treatment Pain Level  0  -SW       Posture/Observations    Posture- WNL  Posture is WNL  -SW    Posture/Observations Comments  Nonantalgic gait. No AD used.  Equal step and stride noted bilaterally.   -SW       Quarter Clearing    Quarter Clearing  Lower Quarter Clearing  -SW       DTR- Lower Quarter Clearing    Patellar tendon (L2-4)  2- Normal response  -SW    Achilles tendon (S1-2)  2- Normal response  -SW       Neural Tension  Signs- Lower Quarter Clearing    Slump  Negative  -SW    SLR  Negative  -SW       Sensory Screen for Light Touch- Lower Quarter Clearing    L1 (inguinal area)  Intact  -SW    L2 (anterior mid thigh)  Intact  -SW    L3 (distal anterior thigh)  Intact  -SW    L4 (medial lower leg/foot)  Intact  -SW    L5 (lateral lower leg/great toe)  Intact  -SW    S1 (bottom of foot)  Intact  -SW       Myotomal Screen- Lower Quarter Clearing    Hip flexion (L2)  5 (Normal)  -SW    Knee extension (L3)  5 (Normal)  -SW    Knee flexion (S2)  5 (Normal)  -SW       Lumbar ROM Screen- Lower Quarter Clearing    Lumbar Flexion  Normal limited only by habitus of fetus  -SW    Lumbar Extension  Normal  -SW    Lumbar Lateral Flexion  Normal  -SW    Lumbar Rotation  Normal  -SW       SI/Hip Screen- Lower Quarter Clearing    ASIS compression  Positive  -SW    Yuliana's/Marvin's test  Left:;Positive  -SW    Pain in Lara's area  Bilateral:;Positive  -SW       Special Tests/Palpation    Special Tests/Palpation  Lumbar/SI  -SW       Lumbosacral Accessory Motions    Lumbosacral Accessory Motions Tested?  Yes reduced AP glide throughout segmentally  -SW    PA glide- Sacral base  -- L sacral base elevated in prone position  -SW    Innominate rotation  -- L ant innom   -SW       Lumbosacral Palpation    Lumbosacral Palpation?  Yes  -SW    SI  Bilateral:;Tender;Guarded/taut  -SW    Lumbosacral Segment  Bilateral:;Tender;Guarded/taut  -SW    Piriformis  Left:;Guarded/taut;Trigger point  -SW    Erector Spinae (Paraspinals)  Bilateral:;Tender;Guarded/taut  -SW    Lumbosacral Palpation Comments  Alignment altered with L ant innom in supine.    -SW      User Key  (r) = Recorded By, (t) = Taken By, (c) = Cosigned By    Initials Name Provider Type    SW Alexandria Gonzalez, PT DPT Physical Therapist                     PT Assessment/Plan     Row Name 06/11/20 1300          PT Assessment    Functional Limitations  Performance in leisure activities;Performance  in self-care ADL;Performance in work activities;Limitation in home management;Limitations in community activities  -     Impairments  Impaired muscle length;Impaired postural alignment;Joint mobility;Pain;Poor body mechanics;Posture  -SW     Assessment Comments  Patient responded well to today's treatment.  No pain at end of treatment. Struggled with ham and add stretching, but tolerated well.  Progressing toward goals. Expect one additional visit then DC due to induction scheduled.   -SW     Rehab Potential  Good  -SW     Patient/caregiver participated in establishment of treatment plan and goals  Yes  -SW     Patient would benefit from skilled therapy intervention  Yes  -SW        PT Plan    PT Frequency  1x/week  -SW     Predicted Duration of Therapy Intervention (Therapy Eval)  6 visit  -SW     PT Plan Comments  One additional visit scheduled prior to induction.  Begin physioball mobility next visit with TA activation.  Manual as indicated.   -SW       User Key  (r) = Recorded By, (t) = Taken By, (c) = Cosigned By    Initials Name Provider Type    Alexandria Wolfe, PT DPT Physical Therapist            OP Exercises     Row Name 06/11/20 1300             Subjective Comments    Subjective Comments  Patient had follow up with MD.  Got checked so stirred it up a bit.  Next week is last appt.  Then will induce if no spontaneous delivery.  Scheduled for 6/26/20 induction if no progress.  Felt a little better with last visit.  But stirred up after appt.   -SW         Subjective Pain    Able to rate subjective pain?  yes  -SW      Pre-Treatment Pain Level  3  -SW      Post-Treatment Pain Level  0  -SW         Exercise 1    Exercise Name 1  Adductor stretch   -SW      Reps 1  3  -SW      Time 1  30 sec  -SW         Exercise 2    Exercise Name 2  piriformis stretch   -SW      Reps 2  3  -SW      Time 2  30 sec  -SW         Exercise 3    Exercise Name 3  Hamstring stretch   -SW      Reps 3  3  -SW      Time 3  30  sec  -SW         Exercise 4    Exercise Name 4  seated TA and PF engagement  -SW      Reps 4  10  -SW      Time 4  5 sec  -SW         Exercise 5    Exercise Name 5  seated TA and PF with Leg extensions  -SW      Reps 5  10  -SW      Time 5  5 sec  -SW         Exercise 6    Exercise Name 6  angry cat stretch   -SW      Reps 6  10  -SW      Time 6  5 sec  -SW        User Key  (r) = Recorded By, (t) = Taken By, (c) = Cosigned By    Initials Name Provider Type    SW Alexandria Gonzalez, PT DPT Physical Therapist           Manual Rx (last 36 hours)      Manual Treatments     Row Name 06/11/20 1500             Manual Rx 1    Manual Rx 1 Location  lumbosacral release  -SW      Manual Rx 1 Type  MFR and cupping  -SW         Manual Rx 2    Manual Rx 2 Location  sacral mobilty  -SW      Manual Rx 2 Type  AP glides  -SW         Manual Rx 3    Manual Rx 3 Location  L ant innom  -SW      Manual Rx 3 Type  MET  -SW         Manual Rx 4    Manual Rx 4 Location  sacral torsion   -SW      Manual Rx 4 Type  MET  -SW        User Key  (r) = Recorded By, (t) = Taken By, (c) = Cosigned By    Initials Name Provider Type    Alexandria Wolfe, PT DPT Physical Therapist                    PT OP Goals     Row Name 06/11/20 1300          PT Short Term Goals    STG Date to Achieve  07/02/20  -     STG 1  patient to be independent and compliant with HEP daily as instructed to assist with general mobility and pain reduction.   -     STG 1 Progress  Progressing  -     STG 2  Patient to report dec pain to mild vs moderately severe as reported with evaluation.  -     STG 2 Progress  Progressing  -     STG 3  patient to perform log roll with spinal neutral mechanics as to improve transfers with less pain.  -     STG 3 Progress  Progressing  -     STG 3 Progress Comments  needs verbal cues.   -     STG 4  patient to show reduced trigger to piriformis muscle such that no palpational tenderness is reported.   -     STG 4 Progress   New  -        Long Term Goals    LTG Date to Achieve  09/04/20  -     LTG 1  Patient subjectively improve 70% better overall.   -     LTG 1 Progress  New  -     LTG 2  Patient to care give for son without restrictions or limitations of function or pain.  -SW     LTG 2 Progress  New  -     LTG 3  Patient to tolerate 6+ hours of sleep with improved positioning and muscle relaxation  -SW     LTG 3 Progress  New  -     LTG 4  Patient to improve segmental mobility of LS such that no pain with AP glides and ROM is functional without pain.  -     LTG 4 Progress  New  -        Time Calculation    PT Goal Re-Cert Due Date  07/02/20  -       User Key  (r) = Recorded By, (t) = Taken By, (c) = Cosigned By    Initials Name Provider Type    Alexandria Wolfe, PT DPT Physical Therapist           Therapy Education  Given: HEP  Program: Reinforced, Progressed  How Provided: Verbal, Demonstration, Written  Provided to: Patient  Level of Understanding: Teach back education performed, Verbalized, Demonstrated              Time Calculation:   Start Time: 1308  Stop Time: 1406  Time Calculation (min): 58 min  Therapy Charges for Today     Code Description Service Date Service Provider Modifiers Qty    64274785812 HC PT MANUAL THERAPY EA 15 MIN 6/11/2020 Alexandria Gonzalez, PT DPT GP 1    59985440111 HC PT THER PROC EA 15 MIN 6/11/2020 Alexandria Gonzalez, PT DPT GP 3                    Alexandria Gonzalez PT DPT  6/11/2020

## 2020-06-11 NOTE — PROGRESS NOTES
"CC: Prenatal visit    Ligia Vargas is a 35 y.o.  at 37w3d.  Doing well.  Patient reports increase in yellow thick vaginal discharge.  Occasional cramping.  Denies contractions, LOF, or VB.  Reports good FM.    /67   Wt 69.4 kg (153 lb)   LMP 2019 (Approximate)   BMI 24.69 kg/m²               2nd PREGNANCY Problems (from 19 to present)     Problem Noted Resolved    Depression affecting pregnancy 1/15/2020 by Ana Maria Graves MD No    Overview Signed 1/15/2020  1:01 PM by Ana Maria Graves MD     Depression/anxiety  No meds         Supervision of high risk pregnancy in third trimester 1/15/2020 by Ana Maria Graves MD No    Overview Addendum 2020 10:27 AM by Ana Maria Graves MD     A pos / Rubella imm / GBS neg  Dating: LMP = 1TUS (13wk)  Genetics: Declined  Tdap: 28wk  Flu: Declined  Anatomy: WNL, BOY \"Shen Ponce\"  1h Glucola: 28wk  H&H/Plts: 28wk  Lab Results   Component Value Date    HGB 13.3 2019    HCT 37.6 2019     2019   Breast vs Bottle / BC undecided         History of postpartum hypertension 2019 by Frida Sims APRN No    Overview Signed 3/5/2020  7:47 AM by Ana Maria Graves MD     PP GHTN; was on labetalol and nifedipine         Tobacco smoking affecting pregnancy in third trimester 2019 by Frida Sims APRN No    Overview Signed 3/5/2020  7:48 AM by Ana Maria Graves MD     Encouraged cessation               A/P: Ligia Vargas is a 35 y.o.  at 37w3d.  Vag panel done, if negative, no news=good news   - RTC in 1 weeks     Diagnosis Plan   1. 37 weeks gestation of pregnancy     2. Depression affecting pregnancy     3. Supervision of high risk pregnancy in third trimester     4. History of postpartum hypertension     5. Tobacco smoking affecting pregnancy in third trimester         Frida Sims " APRN  6/11/2020  10:31

## 2020-06-18 ENCOUNTER — HOSPITAL ENCOUNTER (OUTPATIENT)
Dept: PHYSICAL THERAPY | Facility: HOSPITAL | Age: 35
Setting detail: THERAPIES SERIES
Discharge: HOME | End: 2020-06-18

## 2020-06-18 ENCOUNTER — ROUTINE PRENATAL (OUTPATIENT)
Dept: OBSTETRICS AND GYNECOLOGY | Facility: CLINIC | Age: 35
End: 2020-06-18

## 2020-06-18 VITALS — BODY MASS INDEX: 24.69 KG/M2 | WEIGHT: 153 LBS | SYSTOLIC BLOOD PRESSURE: 118 MMHG | DIASTOLIC BLOOD PRESSURE: 72 MMHG

## 2020-06-18 DIAGNOSIS — O99.333 TOBACCO SMOKING AFFECTING PREGNANCY IN THIRD TRIMESTER: ICD-10-CM

## 2020-06-18 DIAGNOSIS — Z86.79 HISTORY OF POSTPARTUM HYPERTENSION: ICD-10-CM

## 2020-06-18 DIAGNOSIS — O99.891 BACK PAIN IN PREGNANCY: Primary | ICD-10-CM

## 2020-06-18 DIAGNOSIS — Z87.59 HISTORY OF POSTPARTUM HYPERTENSION: ICD-10-CM

## 2020-06-18 DIAGNOSIS — O99.340 DEPRESSION AFFECTING PREGNANCY: ICD-10-CM

## 2020-06-18 DIAGNOSIS — Z3A.38 38 WEEKS GESTATION OF PREGNANCY: ICD-10-CM

## 2020-06-18 DIAGNOSIS — F32.A DEPRESSION AFFECTING PREGNANCY: ICD-10-CM

## 2020-06-18 DIAGNOSIS — O09.93 SUPERVISION OF HIGH RISK PREGNANCY IN THIRD TRIMESTER: Primary | ICD-10-CM

## 2020-06-18 DIAGNOSIS — M54.9 BACK PAIN IN PREGNANCY: Primary | ICD-10-CM

## 2020-06-18 PROCEDURE — 97140 MANUAL THERAPY 1/> REGIONS: CPT | Performed by: PHYSICAL THERAPIST

## 2020-06-18 PROCEDURE — 97110 THERAPEUTIC EXERCISES: CPT | Performed by: PHYSICAL THERAPIST

## 2020-06-18 PROCEDURE — 0502F SUBSEQUENT PRENATAL CARE: CPT | Performed by: OBSTETRICS & GYNECOLOGY

## 2020-06-18 NOTE — THERAPY TREATMENT NOTE
Outpatient Physical Therapy Pelvic Health Treatment Note/Discharge Summary  [facility]     Patient Name: Ligia Vargas  : 1985  MRN: [id number]  Today's Date: 2020        Visit Date: 2020  Visit Number: 3/3  Recheck: NA due to DC today  Insurance: 60v per year  Improvement: 50-60%      Visit Dx:    ICD-10-CM ICD-9-CM   1. Back pain in pregnancy O99.89 646.80    M54.9 724.5       Patient Active Problem List   Diagnosis   • Primary fibromyalgia   • Depression   • Anxiety   • Bleeding hemorrhoids   • Asthma   • History of postpartum hypertension   • Tobacco smoking affecting pregnancy in third trimester   • Depression affecting pregnancy   • Supervision of high risk pregnancy in third trimester        Pelvic Health     Row Name 20 1500             Pregnancy Questions    Number of Pregnancies  2  -SW      Number of Miscarriages  0  -SW      Has the patient had an ?  No  -SW      Number of Children  1 2.6 yo at home  -SW      How many weeks pregnant are you?  38 weeks  -SW      Type of Previous Deliveries  Vaginal episiotomy  -SW      Due Date  20  -SW        User Key  (r) = Recorded By, (t) = Taken By, (c) = Cosigned By    Initials Name Provider Type    SW Alexandria Gonzalez, PT DPT Physical Therapist        PT Ortho     Row Name 20 1500       Subjective Comments    Subjective Comments  Feeling a ton more pressure today.  Got in pool yesterday and did well.  More pain in abdomen and pelvic pressure.  Feeling more natalie katarzyna.  Getting closer I assume.   -SW       Subjective Pain    Able to rate subjective pain?  yes  -SW    Pre-Treatment Pain Level  7  -SW    Post-Treatment Pain Level  4  -SW       Posture/Observations    Posture- WNL  Posture is WNL  -SW    Posture/Observations Comments  A little more labored this date.  T/F with effort this date.  Obvious distress.  LLE ant rotated.  -SW       Quarter Clearing    Quarter Clearing  Lower Quarter Clearing   -SW       DTR- Lower Quarter Clearing    Patellar tendon (L2-4)  2- Normal response  -SW    Achilles tendon (S1-2)  2- Normal response  -SW       Neural Tension Signs- Lower Quarter Clearing    Slump  Negative  -SW    SLR  Negative  -SW       Sensory Screen for Light Touch- Lower Quarter Clearing    L1 (inguinal area)  Intact  -SW    L2 (anterior mid thigh)  Intact  -SW    L3 (distal anterior thigh)  Intact  -SW    L4 (medial lower leg/foot)  Intact  -SW    L5 (lateral lower leg/great toe)  Intact  -SW    S1 (bottom of foot)  Intact  -SW       Myotomal Screen- Lower Quarter Clearing    Hip flexion (L2)  5 (Normal)  -SW    Knee extension (L3)  5 (Normal)  -SW    Knee flexion (S2)  5 (Normal)  -SW       Lumbar ROM Screen- Lower Quarter Clearing    Lumbar Flexion  Normal limited only by habitus of fetus  -SW    Lumbar Extension  Normal  -SW    Lumbar Lateral Flexion  Normal  -SW    Lumbar Rotation  Normal  -SW       SI/Hip Screen- Lower Quarter Clearing    ASIS compression  Positive  -SW    Yuliana's/Marvin's test  Left:;Positive  -SW    Pain in Lara's area  Bilateral:;Positive  -SW       Special Tests/Palpation    Special Tests/Palpation  Lumbar/SI  -SW       Lumbosacral Accessory Motions    Lumbosacral Accessory Motions Tested?  Yes reduced AP glide throughout segmentally  -SW    PA glide- Sacral base  --  -SW    Innominate rotation  -- L ant innom   -SW       Lumbosacral Palpation    Lumbosacral Palpation?  Yes  -SW    SI  Bilateral:;Tender;Guarded/taut  -SW    Lumbosacral Segment  Bilateral:;Tender;Guarded/taut  -SW    Piriformis  -- no trigger palpated throughout piriformis this date.   -SW    Erector Spinae (Paraspinals)  Bilateral:;Tender;Guarded/taut  -SW    Lumbosacral Palpation Comments  L ant innominate continues.  Sacral complex more aligned this date. Cont with ttp throughout sacral region.   -SW      User Key  (r) = Recorded By, (t) = Taken By, (c) = Cosigned By    Initials Name Provider Type    SW  Alexandria Gonzalez, PT DPT Physical Therapist                     PT Assessment/Plan     Row Name 06/18/20 1500          PT Assessment    Functional Limitations  Performance in leisure activities;Performance in self-care ADL;Performance in work activities;Limitation in home management;Limitations in community activities  -     Impairments  Impaired muscle length;Impaired postural alignment;Joint mobility;Pain;Poor body mechanics;Posture  -SW     Assessment Comments  Patient responded well to new additions to exercise on physioball and manual therapy.  Alignment cont with challenges of altered symmetry with L ant rotated innom.  Sacral alignment improved but with cont ttp suspected to be related to pregnancy progression.  DC planned this visit due to induction next week should she not proceed with spontaneous vaginal delivery.  Patient understands importance of compliance with HEP and stretching/stabilization even after delivery to better tolerate daily functional activities.    -SW     Rehab Potential  Good  -SW     Patient/caregiver participated in establishment of treatment plan and goals  Yes  -SW     Patient would benefit from skilled therapy intervention  Yes  -SW        PT Plan    PT Frequency  -- DC this date.   -SW     PT Plan Comments  DC this date to begin I home management due to expected delivery next week.  Should she continue with complaints, patient is requested to FU with and request additional orders as necessary.   -       User Key  (r) = Recorded By, (t) = Taken By, (c) = Cosigned By    Initials Name Provider Type    SW Alexandria Gonzalez, PT DPT Physical Therapist            OP Exercises     Row Name 06/18/20 1500             Subjective Comments    Subjective Comments  Feeling a ton more pressure today.  Got in pool yesterday and did well.  More pain in abdomen and pelvic pressure.  Feeling more natalie katarzyna.  Getting closer I assume.   -         Subjective Pain    Able to rate subjective  pain?  yes  -SW      Pre-Treatment Pain Level  7  -SW      Post-Treatment Pain Level  4  -SW         Exercise 1    Exercise Name 1  Hamstring stretch   -SW      Reps 1  3  -SW      Time 1  30 sec  -SW         Exercise 2    Exercise Name 2  adductor stretch   -SW      Reps 2  3  -SW      Time 2  30 sec  -SW         Exercise 3    Exercise Name 3  physioball tilts  -SW      Time 3  20  -SW      Additional Comments  lateral and A/P  -SW         Exercise 4    Exercise Name 4  physioball circles CW/CCW  -SW      Reps 4  20 ea  -SW         Exercise 5    Exercise Name 5  physioball neutral sit  -SW      Time 5  4'min  -SW      Additional Comments  able to find neutral and maintain position without verbal coaching  -SW         Exercise 6    Exercise Name 6  physioball neutral with O'head flexion   -SW      Sets 6  2  -SW      Reps 6  10  -SW         Exercise 7    Exercise Name 7  physioball neutral with TB clocks  -SW      Reps 7  2  -SW      Time 7  10  -SW      Additional Comments  RTB  -SW         Exercise 8    Exercise Name 8  piriformis stretch   -SW      Reps 8  3  -SW      Time 8  30 sec  -SW         Exercise 9    Exercise Name 9  angry cat stretch   -SW      Reps 9  10  -SW      Time 9  5 sec  -SW         Exercise 10    Exercise Name 10  body mechanics review  -SW      Additional Comments  able to demo and verbalize proper neutral spine mechanics for positioning and transfers.   -SW         Exercise 11    Exercise Name 11  laboring techniques and positions to assist with progression and comfort   -SW        User Key  (r) = Recorded By, (t) = Taken By, (c) = Cosigned By    Initials Name Provider Type    SW Alexandria Gonzalez, PT DPT Physical Therapist           Manual Rx (last 36 hours)      Manual Treatments     Row Name 06/18/20 1500             Manual Rx 1    Manual Rx 1 Location  lumbosacral release  -SW      Manual Rx 1 Type  MFR and cupping  -SW         Manual Rx 2    Manual Rx 2 Location  sacral mobilty  -SW       Manual Rx 2 Type  AP glides  -         Manual Rx 3    Manual Rx 3 Location  L ant innom  -      Manual Rx 3 Type  MET  -         Manual Rx 4    Manual Rx 4 Location  --  -SW      Manual Rx 4 Type  --  -        User Key  (r) = Recorded By, (t) = Taken By, (c) = Cosigned By    Initials Name Provider Type    ROBBY Gonzalez Alexandria Iglesiasle, PT DPT Physical Therapist                    PT OP Goals     Row Name 06/18/20 1500          PT Short Term Goals    STG Date to Achieve  07/02/20  -     STG 1  patient to be independent and compliant with HEP daily as instructed to assist with general mobility and pain reduction.   -     STG 1 Progress  Met  -     STG 2  Patient to report dec pain to mild vs moderately severe as reported with evaluation.  -     STG 2 Progress  Partially Met  -     STG 2 Progress Comments  lowered pain until natalie katarzyna contractions began this week.   -     STG 3  patient to perform log roll with spinal neutral mechanics as to improve transfers with less pain.  -     STG 3 Progress  Met  -     STG 4  patient to show reduced trigger to piriformis muscle such that no palpational tenderness is reported.   -     STG 4 Progress  Met  -        Long Term Goals    LTG Date to Achieve  09/04/20  -     LTG 1  Patient subjectively improve 70% better overall.   -     LTG 1 Progress  Progressing  -     LTG 2  Patient to care give for son without restrictions or limitations of function or pain.  -     LTG 2 Progress  Met  -     LTG 3  Patient to tolerate 6+ hours of sleep with improved positioning and muscle relaxation  -     LTG 3 Progress  Partially Met  -     LTG 3 Progress Comments  improved rest but not for consistent 6 hours.   -     LTG 4  Patient to improve segmental mobility of LS such that no pain with AP glides and ROM is functional without pain.  -     LTG 4 Progress  Met  -     LTG 4 Progress Comments  no pain reported with AP glides this visit.   -         Time Calculation    PT Goal Re-Cert Due Date  07/02/20  -       User Key  (r) = Recorded By, (t) = Taken By, (c) = Cosigned By    Initials Name Provider Type    Alexandria Wolfe, PT DPT Physical Therapist           Therapy Education  Given: HEP, Symptoms/condition management, Posture/body mechanics  Program: Reinforced, Progressed  How Provided: Verbal, Demonstration  Provided to: Patient  Level of Understanding: Teach back education performed, Verbalized, Demonstrated              Time Calculation:   Start Time: 1508  Stop Time: 1603  Time Calculation (min): 55 min  Therapy Charges for Today     Code Description Service Date Service Provider Modifiers Qty    17549112799  PT MANUAL THERAPY EA 15 MIN 6/18/2020 Alexandria Gonzalez, PT DPT GP 2    55370048971 HC PT THER PROC EA 15 MIN 6/18/2020 Alexandria Gonzalez, PT DPT GP 2                    Alexandria Gonzalez PT DPT  6/18/2020

## 2020-06-18 NOTE — PROGRESS NOTES
"CC: Prenatal visit    Ligia Vargas is a 35 y.o.  at 38w3d.  Doing well.  Denies contractions, LOF, or VB.  Reports good FM.    /72   Wt 69.4 kg (153 lb)   LMP 2019 (Approximate)   BMI 24.69 kg/m²   SVE: 3/70/-2/soft/mid  Fundal Height (cm): 38 cm  Fetal Heart Rate: 149    2nd PREGNANCY Problems (from 19 to present)     Problem Noted Resolved    Depression affecting pregnancy 1/15/2020 by Ana Maria Graves MD No    Overview Signed 1/15/2020  1:01 PM by Ana Maria Graves MD     Depression/anxiety  No meds         Supervision of high risk pregnancy in third trimester 1/15/2020 by Ana Maria Graves MD No    Overview Addendum 2020 10:27 AM by Ana Maria Graves MD     A pos / Rubella imm / GBS neg  Dating: LMP = 1TUS (13wk)  Genetics: Declined  Tdap: 28wk  Flu: Declined  Anatomy: WNL, BOY \"Shen Ponce\"  1h Glucola: 28wk  H&H/Plts: 28wk  Lab Results   Component Value Date    HGB 13.3 2019    HCT 37.6 2019     2019   Breast vs Bottle / BC undecided         History of postpartum hypertension 2019 by Frida Sims, CATHERINE No    Overview Signed 3/5/2020  7:47 AM by Ana Maria Graves MD     PP GHTN; was on labetalol and nifedipine         Tobacco smoking affecting pregnancy in third trimester 2019 by Frida Sims, CATHERINE No    Overview Signed 3/5/2020  7:48 AM by Ana Maria Graves MD     Encouraged cessation             A/P: Ligia Vargas is a 35 y.o.  at 38w3d.  - EIOL scheduled for , pitocin  - Reviewed COVID-19 visitation policy  - Reviewed COVID-19 precautions     Diagnosis Plan   1. Supervision of high risk pregnancy in third trimester     2. Depression affecting pregnancy     3. History of postpartum hypertension     4. Tobacco smoking affecting pregnancy in third trimester     5. 38 weeks gestation of pregnancy       Ana Maria" MD Star  6/18/2020  16:41

## 2020-06-26 ENCOUNTER — HOSPITAL ENCOUNTER (INPATIENT)
Facility: HOSPITAL | Age: 35
LOS: 1 days | Discharge: HOME | End: 2020-06-27
Attending: OBSTETRICS & GYNECOLOGY | Admitting: OBSTETRICS & GYNECOLOGY

## 2020-06-26 ENCOUNTER — ANESTHESIA EVENT (OUTPATIENT)
Dept: LABOR AND DELIVERY | Facility: HOSPITAL | Age: 35
End: 2020-06-26

## 2020-06-26 ENCOUNTER — ANESTHESIA (OUTPATIENT)
Dept: LABOR AND DELIVERY | Facility: HOSPITAL | Age: 35
End: 2020-06-26

## 2020-06-26 ENCOUNTER — HOSPITAL ENCOUNTER (OUTPATIENT)
Dept: LABOR AND DELIVERY | Facility: HOSPITAL | Age: 35
Discharge: HOME | End: 2020-06-26

## 2020-06-26 DIAGNOSIS — O09.93 SUPERVISION OF HIGH RISK PREGNANCY IN THIRD TRIMESTER: ICD-10-CM

## 2020-06-26 PROBLEM — Z34.90 ENCOUNTER FOR ELECTIVE INDUCTION OF LABOR: Status: ACTIVE | Noted: 2020-06-26

## 2020-06-26 LAB
ABO GROUP BLD: NORMAL
ALBUMIN SERPL-MCNC: 3 G/DL (ref 3.5–5.2)
ALBUMIN/GLOB SERPL: 1.2 G/DL
ALP SERPL-CCNC: 196 U/L (ref 39–117)
ALT SERPL W P-5'-P-CCNC: 9 U/L (ref 1–33)
AMPHET+METHAMPHET UR QL: NEGATIVE
AMPHETAMINES UR QL: NEGATIVE
ANION GAP SERPL CALCULATED.3IONS-SCNC: 8 MMOL/L (ref 5–15)
ANISOCYTOSIS BLD QL: NORMAL
AST SERPL-CCNC: 21 U/L (ref 1–32)
BARBITURATES UR QL SCN: NEGATIVE
BENZODIAZ UR QL SCN: NEGATIVE
BILIRUB SERPL-MCNC: 0.2 MG/DL (ref 0.2–1.2)
BLD GP AB SCN SERPL QL: NEGATIVE
BUN BLD-MCNC: 3 MG/DL (ref 6–20)
BUN/CREAT SERPL: 5.7 (ref 7–25)
BUPRENORPHINE SERPL-MCNC: NEGATIVE NG/ML
CALCIUM SPEC-SCNC: 8.3 MG/DL (ref 8.6–10.5)
CANNABINOIDS SERPL QL: NEGATIVE
CHLORIDE SERPL-SCNC: 100 MMOL/L (ref 98–107)
CO2 SERPL-SCNC: 23 MMOL/L (ref 22–29)
COCAINE UR QL: NEGATIVE
CREAT BLD-MCNC: 0.53 MG/DL (ref 0.57–1)
DEPRECATED RDW RBC AUTO: 42.2 FL (ref 37–54)
ERYTHROCYTE [DISTWIDTH] IN BLOOD BY AUTOMATED COUNT: 12.1 % (ref 12.3–15.4)
GFR SERPL CREATININE-BSD FRML MDRD: >150 ML/MIN/1.73
GLOBULIN UR ELPH-MCNC: 2.6 GM/DL
GLUCOSE BLD-MCNC: 113 MG/DL (ref 65–99)
HCT VFR BLD AUTO: 34 % (ref 34–46.6)
HGB BLD-MCNC: 11.7 G/DL (ref 12–15.9)
Lab: NORMAL
MACROCYTES BLD QL SMEAR: NORMAL
MCH RBC QN AUTO: 32.5 PG (ref 26.6–33)
MCHC RBC AUTO-ENTMCNC: 34.4 G/DL (ref 31.5–35.7)
MCV RBC AUTO: 94.4 FL (ref 79–97)
METHADONE UR QL SCN: NEGATIVE
OPIATES UR QL: NEGATIVE
OXYCODONE UR QL SCN: NEGATIVE
PCP UR QL SCN: NEGATIVE
PLATELET # BLD AUTO: 215 10*3/MM3 (ref 140–450)
PMV BLD AUTO: 10.3 FL (ref 6–12)
POTASSIUM BLD-SCNC: 3.4 MMOL/L (ref 3.5–5.2)
PROPOXYPH UR QL: NEGATIVE
PROT SERPL-MCNC: 5.6 G/DL (ref 6–8.5)
RBC # BLD AUTO: 3.6 10*6/MM3 (ref 3.77–5.28)
RH BLD: POSITIVE
SMALL PLATELETS BLD QL SMEAR: ADEQUATE
SODIUM BLD-SCNC: 131 MMOL/L (ref 136–145)
T&S EXPIRATION DATE: NORMAL
TRICYCLICS UR QL SCN: NEGATIVE
WBC MORPH BLD: NORMAL
WBC NRBC COR # BLD: 8.14 10*3/MM3 (ref 3.4–10.8)

## 2020-06-26 PROCEDURE — 3E033VJ INTRODUCTION OF OTHER HORMONE INTO PERIPHERAL VEIN, PERCUTANEOUS APPROACH: ICD-10-PCS | Performed by: OBSTETRICS & GYNECOLOGY

## 2020-06-26 PROCEDURE — 51702 INSERT TEMP BLADDER CATH: CPT

## 2020-06-26 PROCEDURE — 80306 DRUG TEST PRSMV INSTRMNT: CPT | Performed by: OBSTETRICS & GYNECOLOGY

## 2020-06-26 PROCEDURE — 86901 BLOOD TYPING SEROLOGIC RH(D): CPT | Performed by: OBSTETRICS & GYNECOLOGY

## 2020-06-26 PROCEDURE — 25010000002 FENTANYL CITRATE (PF) 250 MCG/5ML SOLUTION: Performed by: NURSE ANESTHETIST, CERTIFIED REGISTERED

## 2020-06-26 PROCEDURE — 59400 OBSTETRICAL CARE: CPT | Performed by: OBSTETRICS & GYNECOLOGY

## 2020-06-26 PROCEDURE — 86850 RBC ANTIBODY SCREEN: CPT | Performed by: OBSTETRICS & GYNECOLOGY

## 2020-06-26 PROCEDURE — 85027 COMPLETE CBC AUTOMATED: CPT | Performed by: OBSTETRICS & GYNECOLOGY

## 2020-06-26 PROCEDURE — 0KQM0ZZ REPAIR PERINEUM MUSCLE, OPEN APPROACH: ICD-10-PCS | Performed by: OBSTETRICS & GYNECOLOGY

## 2020-06-26 PROCEDURE — 51703 INSERT BLADDER CATH COMPLEX: CPT

## 2020-06-26 PROCEDURE — 85007 BL SMEAR W/DIFF WBC COUNT: CPT | Performed by: OBSTETRICS & GYNECOLOGY

## 2020-06-26 PROCEDURE — C1755 CATHETER, INTRASPINAL: HCPCS | Performed by: NURSE ANESTHETIST, CERTIFIED REGISTERED

## 2020-06-26 PROCEDURE — C1755 CATHETER, INTRASPINAL: HCPCS

## 2020-06-26 PROCEDURE — 86900 BLOOD TYPING SEROLOGIC ABO: CPT | Performed by: OBSTETRICS & GYNECOLOGY

## 2020-06-26 PROCEDURE — 80053 COMPREHEN METABOLIC PANEL: CPT | Performed by: OBSTETRICS & GYNECOLOGY

## 2020-06-26 RX ORDER — PROMETHAZINE HYDROCHLORIDE 25 MG/ML
12.5 INJECTION, SOLUTION INTRAMUSCULAR; INTRAVENOUS
Status: DISCONTINUED | OUTPATIENT
Start: 2020-06-26 | End: 2020-06-26

## 2020-06-26 RX ORDER — IBUPROFEN 800 MG/1
800 TABLET ORAL EVERY 8 HOURS
Status: DISCONTINUED | OUTPATIENT
Start: 2020-06-26 | End: 2020-06-26

## 2020-06-26 RX ORDER — PROMETHAZINE HYDROCHLORIDE 12.5 MG/1
12.5 SUPPOSITORY RECTAL EVERY 6 HOURS PRN
Status: DISCONTINUED | OUTPATIENT
Start: 2020-06-26 | End: 2020-06-26

## 2020-06-26 RX ORDER — OXYTOCIN/0.9 % SODIUM CHLORIDE 30/500 ML
85 PLASTIC BAG, INJECTION (ML) INTRAVENOUS ONCE
Status: COMPLETED | OUTPATIENT
Start: 2020-06-26 | End: 2020-06-26

## 2020-06-26 RX ORDER — ACETAMINOPHEN 500 MG
1000 TABLET ORAL EVERY 6 HOURS
Status: DISCONTINUED | OUTPATIENT
Start: 2020-06-26 | End: 2020-06-27 | Stop reason: HOSPADM

## 2020-06-26 RX ORDER — LIDOCAINE HYDROCHLORIDE 10 MG/ML
5 INJECTION, SOLUTION EPIDURAL; INFILTRATION; INTRACAUDAL; PERINEURAL AS NEEDED
Status: DISCONTINUED | OUTPATIENT
Start: 2020-06-26 | End: 2020-06-26

## 2020-06-26 RX ORDER — BUPIVACAINE HYDROCHLORIDE 2.5 MG/ML
INJECTION, SOLUTION EPIDURAL; INFILTRATION; INTRACAUDAL AS NEEDED
Status: DISCONTINUED | OUTPATIENT
Start: 2020-06-26 | End: 2020-06-26 | Stop reason: SURG

## 2020-06-26 RX ORDER — OXYTOCIN/0.9 % SODIUM CHLORIDE 30/500 ML
85 PLASTIC BAG, INJECTION (ML) INTRAVENOUS
Status: ACTIVE | OUTPATIENT
Start: 2020-06-26 | End: 2020-06-26

## 2020-06-26 RX ORDER — ACETAMINOPHEN 500 MG
1000 TABLET ORAL EVERY 8 HOURS
Status: DISCONTINUED | OUTPATIENT
Start: 2020-06-26 | End: 2020-06-26

## 2020-06-26 RX ORDER — HYDROCORTISONE 25 MG/G
1 CREAM TOPICAL AS NEEDED
Status: DISCONTINUED | OUTPATIENT
Start: 2020-06-26 | End: 2020-06-27 | Stop reason: HOSPADM

## 2020-06-26 RX ORDER — OXYTOCIN/0.9 % SODIUM CHLORIDE 30/500 ML
650 PLASTIC BAG, INJECTION (ML) INTRAVENOUS ONCE
Status: DISCONTINUED | OUTPATIENT
Start: 2020-06-26 | End: 2020-06-26 | Stop reason: HOSPADM

## 2020-06-26 RX ORDER — METHYLERGONOVINE MALEATE 0.2 MG/ML
200 INJECTION INTRAVENOUS ONCE AS NEEDED
Status: DISCONTINUED | OUTPATIENT
Start: 2020-06-26 | End: 2020-06-26 | Stop reason: HOSPADM

## 2020-06-26 RX ORDER — ONDANSETRON 2 MG/ML
4 INJECTION INTRAMUSCULAR; INTRAVENOUS EVERY 6 HOURS PRN
Status: DISCONTINUED | OUTPATIENT
Start: 2020-06-26 | End: 2020-06-27 | Stop reason: HOSPADM

## 2020-06-26 RX ORDER — SODIUM CHLORIDE 0.9 % (FLUSH) 0.9 %
3-10 SYRINGE (ML) INJECTION AS NEEDED
Status: DISCONTINUED | OUTPATIENT
Start: 2020-06-26 | End: 2020-06-26

## 2020-06-26 RX ORDER — SODIUM CHLORIDE, SODIUM LACTATE, POTASSIUM CHLORIDE, CALCIUM CHLORIDE 600; 310; 30; 20 MG/100ML; MG/100ML; MG/100ML; MG/100ML
125 INJECTION, SOLUTION INTRAVENOUS CONTINUOUS
Status: DISCONTINUED | OUTPATIENT
Start: 2020-06-26 | End: 2020-06-26

## 2020-06-26 RX ORDER — CALCIUM CARBONATE 200(500)MG
2 TABLET,CHEWABLE ORAL 3 TIMES DAILY PRN
Status: DISCONTINUED | OUTPATIENT
Start: 2020-06-26 | End: 2020-06-27 | Stop reason: HOSPADM

## 2020-06-26 RX ORDER — OXYTOCIN/0.9 % SODIUM CHLORIDE 30/500 ML
325 PLASTIC BAG, INJECTION (ML) INTRAVENOUS ONCE
Status: DISCONTINUED | OUTPATIENT
Start: 2020-06-26 | End: 2020-06-27 | Stop reason: HOSPADM

## 2020-06-26 RX ORDER — MISOPROSTOL 200 UG/1
800 TABLET ORAL AS NEEDED
Status: DISCONTINUED | OUTPATIENT
Start: 2020-06-26 | End: 2020-06-26 | Stop reason: HOSPADM

## 2020-06-26 RX ORDER — SODIUM CHLORIDE 0.9 % (FLUSH) 0.9 %
1-10 SYRINGE (ML) INJECTION AS NEEDED
Status: DISCONTINUED | OUTPATIENT
Start: 2020-06-26 | End: 2020-06-27 | Stop reason: HOSPADM

## 2020-06-26 RX ORDER — FENTANYL CITRATE 50 UG/ML
INJECTION, SOLUTION INTRAMUSCULAR; INTRAVENOUS AS NEEDED
Status: DISCONTINUED | OUTPATIENT
Start: 2020-06-26 | End: 2020-06-26 | Stop reason: SURG

## 2020-06-26 RX ORDER — LANOLIN 100 %
OINTMENT (GRAM) TOPICAL
Status: DISCONTINUED | OUTPATIENT
Start: 2020-06-26 | End: 2020-06-27 | Stop reason: HOSPADM

## 2020-06-26 RX ORDER — ONDANSETRON 4 MG/1
4 TABLET, FILM COATED ORAL EVERY 6 HOURS PRN
Status: DISCONTINUED | OUTPATIENT
Start: 2020-06-26 | End: 2020-06-26

## 2020-06-26 RX ORDER — ONDANSETRON 4 MG/1
4 TABLET, FILM COATED ORAL EVERY 6 HOURS PRN
Status: DISCONTINUED | OUTPATIENT
Start: 2020-06-26 | End: 2020-06-27 | Stop reason: HOSPADM

## 2020-06-26 RX ORDER — IBUPROFEN 800 MG/1
800 TABLET ORAL EVERY 8 HOURS
Status: DISCONTINUED | OUTPATIENT
Start: 2020-06-26 | End: 2020-06-27 | Stop reason: HOSPADM

## 2020-06-26 RX ORDER — DOCUSATE SODIUM 100 MG/1
100 CAPSULE, LIQUID FILLED ORAL 2 TIMES DAILY
Status: DISCONTINUED | OUTPATIENT
Start: 2020-06-26 | End: 2020-06-27 | Stop reason: HOSPADM

## 2020-06-26 RX ORDER — FERROUS SULFATE TAB EC 324 MG (65 MG FE EQUIVALENT) 324 (65 FE) MG
324 TABLET DELAYED RESPONSE ORAL 2 TIMES DAILY WITH MEALS
Status: DISCONTINUED | OUTPATIENT
Start: 2020-06-26 | End: 2020-06-27 | Stop reason: HOSPADM

## 2020-06-26 RX ORDER — PRENATAL VIT/IRON FUM/FOLIC AC 27MG-0.8MG
1 TABLET ORAL DAILY
Status: DISCONTINUED | OUTPATIENT
Start: 2020-06-26 | End: 2020-06-27 | Stop reason: HOSPADM

## 2020-06-26 RX ORDER — SODIUM CHLORIDE 0.9 % (FLUSH) 0.9 %
3 SYRINGE (ML) INJECTION EVERY 12 HOURS SCHEDULED
Status: DISCONTINUED | OUTPATIENT
Start: 2020-06-26 | End: 2020-06-26

## 2020-06-26 RX ORDER — BISACODYL 10 MG
10 SUPPOSITORY, RECTAL RECTAL DAILY PRN
Status: DISCONTINUED | OUTPATIENT
Start: 2020-06-27 | End: 2020-06-27 | Stop reason: HOSPADM

## 2020-06-26 RX ORDER — ONDANSETRON 2 MG/ML
4 INJECTION INTRAMUSCULAR; INTRAVENOUS EVERY 6 HOURS PRN
Status: DISCONTINUED | OUTPATIENT
Start: 2020-06-26 | End: 2020-06-26

## 2020-06-26 RX ORDER — PROMETHAZINE HYDROCHLORIDE 12.5 MG/1
12.5 TABLET ORAL EVERY 6 HOURS PRN
Status: DISCONTINUED | OUTPATIENT
Start: 2020-06-26 | End: 2020-06-26

## 2020-06-26 RX ORDER — OXYTOCIN/0.9 % SODIUM CHLORIDE 30/500 ML
2 PLASTIC BAG, INJECTION (ML) INTRAVENOUS
Status: DISCONTINUED | OUTPATIENT
Start: 2020-06-26 | End: 2020-06-26

## 2020-06-26 RX ORDER — LIDOCAINE HYDROCHLORIDE AND EPINEPHRINE 15; 5 MG/ML; UG/ML
INJECTION, SOLUTION EPIDURAL AS NEEDED
Status: DISCONTINUED | OUTPATIENT
Start: 2020-06-26 | End: 2020-06-26 | Stop reason: SURG

## 2020-06-26 RX ORDER — CARBOPROST TROMETHAMINE 250 UG/ML
250 INJECTION, SOLUTION INTRAMUSCULAR AS NEEDED
Status: DISCONTINUED | OUTPATIENT
Start: 2020-06-26 | End: 2020-06-26 | Stop reason: HOSPADM

## 2020-06-26 RX ADMIN — LIDOCAINE HYDROCHLORIDE AND EPINEPHRINE 3 ML: 15; 5 INJECTION, SOLUTION EPIDURAL at 07:14

## 2020-06-26 RX ADMIN — OXYTOCIN-SODIUM CHLORIDE 0.9% IV SOLN 30 UNIT/500ML 85 ML/HR: 30-0.9/5 SOLUTION at 14:17

## 2020-06-26 RX ADMIN — SODIUM CHLORIDE, POTASSIUM CHLORIDE, SODIUM LACTATE AND CALCIUM CHLORIDE 999 ML/HR: 600; 310; 30; 20 INJECTION, SOLUTION INTRAVENOUS at 07:25

## 2020-06-26 RX ADMIN — SODIUM CHLORIDE, POTASSIUM CHLORIDE, SODIUM LACTATE AND CALCIUM CHLORIDE 125 ML/HR: 600; 310; 30; 20 INJECTION, SOLUTION INTRAVENOUS at 05:56

## 2020-06-26 RX ADMIN — FENTANYL CITRATE 100 MCG: 50 INJECTION, SOLUTION INTRAMUSCULAR; INTRAVENOUS at 07:18

## 2020-06-26 RX ADMIN — DOCUSATE SODIUM 100 MG: 100 CAPSULE, LIQUID FILLED ORAL at 20:41

## 2020-06-26 RX ADMIN — ACETAMINOPHEN 1000 MG: 500 TABLET ORAL at 20:43

## 2020-06-26 RX ADMIN — FERROUS SULFATE TAB EC 324 MG (65 MG FE EQUIVALENT) 324 MG: 324 (65 FE) TABLET DELAYED RESPONSE at 20:44

## 2020-06-26 RX ADMIN — IBUPROFEN 800 MG: 800 TABLET ORAL at 16:53

## 2020-06-26 RX ADMIN — BUPIVACAINE HYDROCHLORIDE 15 ML: 2.5 INJECTION, SOLUTION EPIDURAL; INFILTRATION; INTRACAUDAL; PERINEURAL at 07:18

## 2020-06-26 RX ADMIN — OXYTOCIN-SODIUM CHLORIDE 0.9% IV SOLN 30 UNIT/500ML 2 MILLI-UNITS/MIN: 30-0.9/5 SOLUTION at 08:02

## 2020-06-26 NOTE — ANESTHESIA PROCEDURE NOTES
Epidural Block      Patient reassessed immediately prior to procedure    Start Time: 6/26/2020 7:58 AM  Indication:at surgeon's request and procedure for pain  Preanesthetic Checklist  Completed: patient identified, site marked, surgical consent, pre-op evaluation, timeout performed, IV checked, risks and benefits discussed and monitors and equipment checked  Additional Notes  Cath in 5cm  Prep:  Pt Position:sitting  Sterile Tech:cap, gloves, mask and sterile barrier  Prep:povidone-iodine 7.5% surgical scrub  Monitoring:blood pressure monitoring, continuous pulse oximetry and EKG  Epidural Block Procedure:  Approach:midline  Guidance:landmark technique and palpation technique  Location:lumbar  Level:3-4  Needle Type:Ore[place]tead  Needle Gauge:17 G  Loss of Resistance Medium: saline  Paresthesia: right and transient  Aspiration:negative  Test Dose:negative  Post Assessment:  Dressing:occlusive dressing applied and secured with tape  Pt Tolerance:patient tolerated the procedure well with no apparent complications  Complications:no

## 2020-06-26 NOTE — ANESTHESIA POSTPROCEDURE EVALUATION
Patient: Ligia Vargas    Procedure Summary     Date:  06/26/20 Room / Location:      Anesthesia Start:  0700 Anesthesia Stop:  1240    Procedure:  LABOR ANALGESIA Diagnosis:      Scheduled Providers:   Provider:  Talon Mays CRNA    Anesthesia Type:  epidural ASA Status:  2 - Emergent          Anesthesia Type: epidural    Vitals  Vitals Value Taken Time   /75 6/26/2020  1:02 PM   Temp 97.5 °F (36.4 °C) 6/26/2020 12:32 PM   Pulse 51 6/26/2020  1:02 PM   Resp 22 6/26/2020 12:32 PM   SpO2 98 % 6/26/2020  7:42 AM   Vitals shown include unvalidated device data.        Post Anesthesia Care and Evaluation    Patient location during evaluation: bedside  Patient participation: complete - patient participated  Level of consciousness: awake  Pain score: 0  Pain management: adequate  Airway patency: patent  Anesthetic complications: No anesthetic complications  PONV Status: none  Cardiovascular status: acceptable and stable  Respiratory status: acceptable  Hydration status: acceptable  Post Neuraxial Block status: Motor and sensory function returned to baseline and No signs or symptoms of PDPH

## 2020-06-26 NOTE — ANESTHESIA PREPROCEDURE EVALUATION
Anesthesia Evaluation     Patient summary reviewed and Nursing notes reviewed   NPO Solid Status: > 4 hours  NPO Liquid Status: < 2 hours           Airway   Mallampati: III  Neck ROM: full  No difficulty expected  Dental - normal exam     Pulmonary - normal exam   (+) asthma,  Cardiovascular - normal exam        Neuro/Psych  (+) tremors, psychiatric history,     GI/Hepatic/Renal/Endo      Musculoskeletal     (+) myalgias,   Abdominal   (+) obese,    Substance History      OB/GYN    (+) Pregnant,         Other            Phys Exam Other: scoliosis                Anesthesia Plan    ASA 2 - emergent     epidural       Anesthetic plan, all risks, benefits, and alternatives have been provided, discussed and informed consent has been obtained with: patient and spouse/significant other.

## 2020-06-27 VITALS
OXYGEN SATURATION: 98 % | BODY MASS INDEX: 25.5 KG/M2 | WEIGHT: 158 LBS | DIASTOLIC BLOOD PRESSURE: 80 MMHG | SYSTOLIC BLOOD PRESSURE: 133 MMHG | TEMPERATURE: 98.2 F | RESPIRATION RATE: 18 BRPM | HEART RATE: 60 BPM

## 2020-06-27 PROCEDURE — 99024 POSTOP FOLLOW-UP VISIT: CPT | Performed by: OBSTETRICS & GYNECOLOGY

## 2020-06-27 RX ADMIN — DOCUSATE SODIUM 100 MG: 100 CAPSULE, LIQUID FILLED ORAL at 08:51

## 2020-06-27 RX ADMIN — ACETAMINOPHEN 1000 MG: 500 TABLET ORAL at 05:25

## 2020-06-27 RX ADMIN — IBUPROFEN 800 MG: 800 TABLET ORAL at 08:51

## 2020-06-27 RX ADMIN — IBUPROFEN 800 MG: 800 TABLET ORAL at 01:14

## 2020-06-27 RX ADMIN — FERROUS SULFATE TAB EC 324 MG (65 MG FE EQUIVALENT) 324 MG: 324 (65 FE) TABLET DELAYED RESPONSE at 08:51

## 2020-07-01 ENCOUNTER — TELEPHONE (OUTPATIENT)
Dept: OBSTETRICS AND GYNECOLOGY | Facility: CLINIC | Age: 35
End: 2020-07-01

## 2020-07-01 ENCOUNTER — OFFICE VISIT (OUTPATIENT)
Dept: OBSTETRICS AND GYNECOLOGY | Facility: CLINIC | Age: 35
End: 2020-07-01

## 2020-07-01 VITALS
HEIGHT: 66 IN | SYSTOLIC BLOOD PRESSURE: 124 MMHG | BODY MASS INDEX: 21.86 KG/M2 | TEMPERATURE: 98.7 F | DIASTOLIC BLOOD PRESSURE: 76 MMHG | WEIGHT: 136 LBS

## 2020-07-01 DIAGNOSIS — O92.79: Primary | ICD-10-CM

## 2020-07-01 PROCEDURE — 0503F POSTPARTUM CARE VISIT: CPT | Performed by: NURSE PRACTITIONER

## 2020-07-01 NOTE — PROGRESS NOTES
"Subjective   Ligia Vargas is a 35 y.o. mastitis    Pt presents with complaints of \"I have mastitis\". She had a spontaneous vaginal delivery on 06/26/2020.  Yesterday she noticed her breasts have been swollen with tender knots.   Currently, she is exclusively breastfeeding around every 2 hours.  On average her baby nurses 10-25 minutes, but she also reports falls asleep a lot at the breast.  Denies fever, chills, or feeling unwell.      Breast Problem   This is a new problem. The current episode started yesterday. The problem occurs daily. The problem has been gradually worsening. Pertinent negatives include no anorexia, chest pain, chills, diaphoresis, fatigue, fever, headaches or rash. Nothing aggravates the symptoms. She has tried nothing for the symptoms.       The following portions of the patient's history were reviewed and updated as appropriate: allergies, current medications, past family history, past medical history, past social history, past surgical history and problem list.    Review of Systems   Constitutional: Negative for chills, diaphoresis, fatigue, fever and unexpected weight change.   Respiratory: Negative for apnea, chest tightness and shortness of breath.    Cardiovascular: Negative for chest pain and palpitations.   Gastrointestinal: Negative for abdominal distention, anorexia, constipation and diarrhea.   Genitourinary: Negative for difficulty urinating, dysuria, enuresis, flank pain, frequency, genital sores, hematuria, pelvic pain and urgency.   Skin: Negative for rash and wound.   Neurological: Negative for headaches.   Psychiatric/Behavioral: Negative for sleep disturbance.         Objective   Physical Exam   Constitutional: She is oriented to person, place, and time. Vital signs are normal. She appears well-developed and well-nourished. No distress.   Cardiovascular: Normal rate, regular rhythm and normal heart sounds.   Pulmonary/Chest: Effort normal and breath sounds normal. " There is breast swelling and tenderness. No breast bleeding. Breasts are symmetrical.   easily expressed milk from each breast   Genitourinary: There is breast swelling and tenderness. No breast bleeding.   Neurological: She is alert and oriented to person, place, and time.   Skin: Skin is warm and dry. She is not diaphoretic.   Psychiatric: She has a normal mood and affect. Her behavior is normal.   Nursing note and vitals reviewed.        Assessment/Plan   Diagnoses and all orders for this visit:    Engorgement of breasts, postpartum, following discharge from delivery  -     dicloxacillin (DYNAPEN) 500 MG capsule; Take 1 capsule by mouth 4 (Four) Times a Day for 10 days.      Discussed importance of properly emptying breasts through nursing.  Ensure proper latch.  Re-faxed pt electric breast pump form.  Sent her home with hand pump.  Take warm shower and massage tender knots in breasts.  Motrin 600mg PO every 6 hours prn pain.  Sent home with antibiotic only pick it up if symptoms worsen or fail to improve.

## 2020-07-16 ENCOUNTER — POSTPARTUM VISIT (OUTPATIENT)
Dept: OBSTETRICS AND GYNECOLOGY | Facility: CLINIC | Age: 35
End: 2020-07-16

## 2020-07-16 ENCOUNTER — TELEPHONE (OUTPATIENT)
Dept: LACTATION | Facility: HOSPITAL | Age: 35
End: 2020-07-16

## 2020-07-16 VITALS
DIASTOLIC BLOOD PRESSURE: 66 MMHG | SYSTOLIC BLOOD PRESSURE: 118 MMHG | BODY MASS INDEX: 21.66 KG/M2 | HEIGHT: 66 IN | WEIGHT: 134.8 LBS

## 2020-07-16 PROCEDURE — 0503F POSTPARTUM CARE VISIT: CPT | Performed by: OBSTETRICS & GYNECOLOGY

## 2020-07-16 RX ORDER — BREAST PUMP
EACH MISCELLANEOUS
Qty: 1 EACH | Refills: 0 | Status: SHIPPED | OUTPATIENT
Start: 2020-07-16 | End: 2021-10-13

## 2020-07-16 NOTE — PROGRESS NOTES
"Postpartum visit      Ligia Vargas is a 35 y.o.  s/p Vaginal, Spontaneous on 2020 at 39w4d secondary to EIOL who presents today for postpartum check.  The patient states she feels very overwhelmed.  Patient reports that she feels postpartum depression.  She has had a hard time with breastfeeding and feels like the baby is choking from too much milk.  Menstrual cycles have not resumed.  Breastfeeding.  Undecided for contraception.  She has not resumed sexual intercourse.  Denies bowel or bladder issues.    PHYSICAL EXAM:    /66 (BP Location: Left arm, Patient Position: Sitting, Cuff Size: Adult)   Ht 167.6 cm (66\")   Wt 61.1 kg (134 lb 12.8 oz)   LMP 2019 (Approximate)   BMI 21.76 kg/m²   Breast Exam: Lactating, no masses, skin dimpling, nipple retraction, or nipple discharge bilaterally.  Abdomen: +BS, benign, no masses, soft, non-tender.  Extremities: No deep calf tenderness.  Postpartum Depression Screening Questionnaire: 20, treatment indicated.    IMPRESSION/PLAN:  35 y.o.  s/p term Vaginal, Spontaneous on 2020.  Doing well  - Recovered nicely from her delivery  - Contraception: Undecided  - PPD: Zoloft; declines counseling  - RTC 4 PP visit    This document has been electronically signed by Ana Maria Graves MD on 2020 12:00.  "

## 2020-07-16 NOTE — TELEPHONE ENCOUNTER
I was contacted by Dr. Graves that her patient was having difficulty with BF. I attempted to call but no answer. Voice message left with a call back number.

## 2020-07-17 ENCOUNTER — TELEPHONE (OUTPATIENT)
Dept: OBSTETRICS AND GYNECOLOGY | Facility: HOSPITAL | Age: 35
End: 2020-07-17

## 2020-07-30 ENCOUNTER — POSTPARTUM VISIT (OUTPATIENT)
Dept: OBSTETRICS AND GYNECOLOGY | Facility: CLINIC | Age: 35
End: 2020-07-30

## 2020-07-30 VITALS
DIASTOLIC BLOOD PRESSURE: 62 MMHG | BODY MASS INDEX: 20.83 KG/M2 | WEIGHT: 129.6 LBS | SYSTOLIC BLOOD PRESSURE: 118 MMHG | HEIGHT: 66 IN

## 2020-07-30 PROCEDURE — 0503F POSTPARTUM CARE VISIT: CPT | Performed by: OBSTETRICS & GYNECOLOGY

## 2020-07-30 RX ORDER — FLUCONAZOLE 150 MG/1
TABLET ORAL
Qty: 4 TABLET | Refills: 0 | Status: SHIPPED | OUTPATIENT
Start: 2020-07-30 | End: 2020-08-17

## 2020-07-30 NOTE — PROGRESS NOTES
"Postpartum visit      Ligia Vargas is a 35 y.o.  s/p Vaginal, Spontaneous on 2020 at 39w4d secondary to EIOL who presents today for postpartum check.  The patient states she feels void of emotions.  She states that she feels as though she is having a hard time bonding with her baby.  She showed me pictures of the baby.  Menstrual cycles have not resumed.  Breastfeeding.  Reports that he has thrush.  Undecided for contraception.  She has not resumed sexual intercourse.  Denies bowel or bladder issues.    PHYSICAL EXAM:    /62 (BP Location: Left arm, Patient Position: Sitting, Cuff Size: Adult)   Ht 167.6 cm (66\")   Wt 58.8 kg (129 lb 9.6 oz)   LMP 2019 (Approximate)   BMI 20.92 kg/m²   Abdomen: +BS, benign, no masses, soft, non-tender.  Extremities: No deep calf tenderness.  Postpartum Depression Screening Questionnaire: 19, treatment previously initiated    IMPRESSION/PLAN:  35 y.o.  s/p term Vaginal, Spontaneous on 2020.  Doing well.  - Recovered nicely from her delivery  - Restrictions lifted at 6wks  - Contraception: Declined  - PPD: Zoloft; declines counseling but heavily encouraged  - RTC 2 weeks for telephone visit  - Diflucan 150mg q3d x4 doses    This document has been electronically signed by Ana Maria Graves MD on 2020 11:18.  "

## 2020-08-12 ENCOUNTER — TELEPHONE (OUTPATIENT)
Dept: OBSTETRICS AND GYNECOLOGY | Facility: CLINIC | Age: 35
End: 2020-08-12

## 2020-08-17 ENCOUNTER — OFFICE VISIT (OUTPATIENT)
Dept: OBSTETRICS AND GYNECOLOGY | Facility: CLINIC | Age: 35
End: 2020-08-17

## 2020-08-17 PROBLEM — O99.333 TOBACCO SMOKING AFFECTING PREGNANCY IN THIRD TRIMESTER: Status: RESOLVED | Noted: 2019-11-25 | Resolved: 2020-08-17

## 2020-08-17 PROBLEM — Z86.79 HISTORY OF POSTPARTUM HYPERTENSION: Status: RESOLVED | Noted: 2019-11-25 | Resolved: 2020-08-17

## 2020-08-17 PROBLEM — O09.93 SUPERVISION OF HIGH RISK PREGNANCY IN THIRD TRIMESTER: Status: RESOLVED | Noted: 2020-01-15 | Resolved: 2020-08-17

## 2020-08-17 PROBLEM — Z34.90 ENCOUNTER FOR ELECTIVE INDUCTION OF LABOR: Status: RESOLVED | Noted: 2020-06-26 | Resolved: 2020-08-17

## 2020-08-17 PROBLEM — Z87.59 HISTORY OF POSTPARTUM HYPERTENSION: Status: RESOLVED | Noted: 2019-11-25 | Resolved: 2020-08-17

## 2020-08-17 PROBLEM — O99.340 DEPRESSION AFFECTING PREGNANCY: Status: RESOLVED | Noted: 2020-01-15 | Resolved: 2020-08-17

## 2020-08-17 PROBLEM — F32.A DEPRESSION AFFECTING PREGNANCY: Status: RESOLVED | Noted: 2020-01-15 | Resolved: 2020-08-17

## 2020-08-17 PROCEDURE — 0503F POSTPARTUM CARE VISIT: CPT | Performed by: OBSTETRICS & GYNECOLOGY

## 2020-08-17 NOTE — PROGRESS NOTES
"Postpartum visit      Ligia Vargas is a 35 y.o.  s/p Vaginal, Spontaneous on 2020 at 39w4d secondary to EIOL who presents today for 6wk PP visit and 2wk mood check after starting meds.      She states that she thinks that the medication has helped some.  She states that she is not as depressed as she was before.  She states that \"she is not letting stuff get to her like she was before.\"  Denies SI/HI.    Menstrual cycles have not resumed.  Breastfeeding.  Reports that he has thrush.  Undecided for contraception.  She has not resumed sexual intercourse.  Denies bowel or bladder issues.    PHYSICAL EXAM:    LMP 2019 (Approximate)   Breastfeeding Yes   Postpartum Depression Screening Questionnaire: 14, treatment previously initiated    IMPRESSION/PLAN:  35 y.o.  s/p term Vaginal, Spontaneous on 2020.  Doing well.  - Recovered nicely from her delivery  - Contraception: Undecided   - Discussed options for contraception including OCPs, POPs, DepoProvera, contraceptive ring/patch, Nexplanon, Mirena IUD, and ParaGard IUD.  Discussed pros/cons of each type of contraception.  Discussed that use does not prevent 100% of all pregnancies.   - She will call back with decision  - PPD: Zoloft; declines counseling but heavily encouraged   - She would like to call us in 1 months time to let us know how she is doing mood wise.    This document has been electronically signed by Ana Maria Graves MD on 2020 16:10.    This visit has been rescheduled as a phone visit to comply with patient safety concerns in accordance with CDC recommendations.  Total time of discussion was 10 minutes.  The use of a telephone visit has been reviewed with the patient and verbal informed consent has been obtained.  "

## 2020-12-02 ENCOUNTER — HOSPITAL ENCOUNTER (EMERGENCY)
Facility: HOSPITAL | Age: 35
Discharge: HOME | End: 2020-12-02
Attending: EMERGENCY MEDICINE | Admitting: EMERGENCY MEDICINE

## 2020-12-02 VITALS
HEIGHT: 66 IN | WEIGHT: 125 LBS | BODY MASS INDEX: 20.09 KG/M2 | TEMPERATURE: 97.5 F | HEART RATE: 58 BPM | DIASTOLIC BLOOD PRESSURE: 90 MMHG | RESPIRATION RATE: 18 BRPM | OXYGEN SATURATION: 100 % | SYSTOLIC BLOOD PRESSURE: 152 MMHG

## 2020-12-02 DIAGNOSIS — I10 ESSENTIAL HYPERTENSION: Primary | ICD-10-CM

## 2020-12-02 LAB
ALBUMIN SERPL-MCNC: 4.3 G/DL (ref 3.5–5.2)
ALBUMIN/GLOB SERPL: 1.4 G/DL
ALP SERPL-CCNC: 82 U/L (ref 39–117)
ALT SERPL W P-5'-P-CCNC: 16 U/L (ref 1–33)
ANION GAP SERPL CALCULATED.3IONS-SCNC: 12 MMOL/L (ref 5–15)
AST SERPL-CCNC: 23 U/L (ref 1–32)
B-HCG UR QL: NEGATIVE
BASOPHILS # BLD AUTO: 0.04 10*3/MM3 (ref 0–0.2)
BASOPHILS NFR BLD AUTO: 0.3 % (ref 0–1.5)
BILIRUB SERPL-MCNC: 0.3 MG/DL (ref 0–1.2)
BILIRUB UR QL STRIP: NEGATIVE
BUN SERPL-MCNC: 8 MG/DL (ref 6–20)
BUN/CREAT SERPL: 12.5 (ref 7–25)
CALCIUM SPEC-SCNC: 9.5 MG/DL (ref 8.6–10.5)
CHLORIDE SERPL-SCNC: 100 MMOL/L (ref 98–107)
CLARITY UR: CLEAR
CO2 SERPL-SCNC: 27 MMOL/L (ref 22–29)
COLOR UR: YELLOW
CREAT SERPL-MCNC: 0.64 MG/DL (ref 0.57–1)
DEPRECATED RDW RBC AUTO: 42.5 FL (ref 37–54)
EOSINOPHIL # BLD AUTO: 0.04 10*3/MM3 (ref 0–0.4)
EOSINOPHIL NFR BLD AUTO: 0.3 % (ref 0.3–6.2)
ERYTHROCYTE [DISTWIDTH] IN BLOOD BY AUTOMATED COUNT: 12.1 % (ref 12.3–15.4)
GFR SERPL CREATININE-BSD FRML MDRD: 129 ML/MIN/1.73
GLOBULIN UR ELPH-MCNC: 3 GM/DL
GLUCOSE SERPL-MCNC: 104 MG/DL (ref 65–99)
GLUCOSE UR STRIP-MCNC: NEGATIVE MG/DL
HCT VFR BLD AUTO: 39.2 % (ref 34–46.6)
HGB BLD-MCNC: 13 G/DL (ref 12–15.9)
HGB UR QL STRIP.AUTO: NEGATIVE
HOLD SPECIMEN: NORMAL
IMM GRANULOCYTES # BLD AUTO: 0.03 10*3/MM3 (ref 0–0.05)
IMM GRANULOCYTES NFR BLD AUTO: 0.3 % (ref 0–0.5)
KETONES UR QL STRIP: NEGATIVE
LEUKOCYTE ESTERASE UR QL STRIP.AUTO: NEGATIVE
LYMPHOCYTES # BLD AUTO: 1.46 10*3/MM3 (ref 0.7–3.1)
LYMPHOCYTES NFR BLD AUTO: 12.7 % (ref 19.6–45.3)
MCH RBC QN AUTO: 31.9 PG (ref 26.6–33)
MCHC RBC AUTO-ENTMCNC: 33.2 G/DL (ref 31.5–35.7)
MCV RBC AUTO: 96.1 FL (ref 79–97)
MONOCYTES # BLD AUTO: 0.53 10*3/MM3 (ref 0.1–0.9)
MONOCYTES NFR BLD AUTO: 4.6 % (ref 5–12)
NEUTROPHILS NFR BLD AUTO: 81.8 % (ref 42.7–76)
NEUTROPHILS NFR BLD AUTO: 9.43 10*3/MM3 (ref 1.7–7)
NITRITE UR QL STRIP: NEGATIVE
NRBC BLD AUTO-RTO: 0 /100 WBC (ref 0–0.2)
PH UR STRIP.AUTO: 7 [PH] (ref 5–9)
PLATELET # BLD AUTO: 232 10*3/MM3 (ref 140–450)
PMV BLD AUTO: 9.3 FL (ref 6–12)
POTASSIUM SERPL-SCNC: 3.6 MMOL/L (ref 3.5–5.2)
PROT SERPL-MCNC: 7.3 G/DL (ref 6–8.5)
PROT UR QL STRIP: NEGATIVE
QT INTERVAL: 428 MS
QTC INTERVAL: 398 MS
RBC # BLD AUTO: 4.08 10*6/MM3 (ref 3.77–5.28)
SODIUM SERPL-SCNC: 139 MMOL/L (ref 136–145)
SP GR UR STRIP: 1.01 (ref 1–1.03)
TROPONIN T SERPL-MCNC: <0.01 NG/ML (ref 0–0.03)
UROBILINOGEN UR QL STRIP: NORMAL
WBC # BLD AUTO: 11.53 10*3/MM3 (ref 3.4–10.8)

## 2020-12-02 PROCEDURE — 93010 ELECTROCARDIOGRAM REPORT: CPT | Performed by: INTERNAL MEDICINE

## 2020-12-02 PROCEDURE — 99283 EMERGENCY DEPT VISIT LOW MDM: CPT

## 2020-12-02 PROCEDURE — 81025 URINE PREGNANCY TEST: CPT | Performed by: EMERGENCY MEDICINE

## 2020-12-02 PROCEDURE — 25010000002 HYDRALAZINE PER 20 MG: Performed by: EMERGENCY MEDICINE

## 2020-12-02 PROCEDURE — 81003 URINALYSIS AUTO W/O SCOPE: CPT | Performed by: EMERGENCY MEDICINE

## 2020-12-02 PROCEDURE — 93005 ELECTROCARDIOGRAM TRACING: CPT | Performed by: EMERGENCY MEDICINE

## 2020-12-02 PROCEDURE — 80053 COMPREHEN METABOLIC PANEL: CPT | Performed by: EMERGENCY MEDICINE

## 2020-12-02 PROCEDURE — 84484 ASSAY OF TROPONIN QUANT: CPT | Performed by: EMERGENCY MEDICINE

## 2020-12-02 PROCEDURE — 85025 COMPLETE CBC W/AUTO DIFF WBC: CPT | Performed by: EMERGENCY MEDICINE

## 2020-12-02 PROCEDURE — 96374 THER/PROPH/DIAG INJ IV PUSH: CPT

## 2020-12-02 RX ORDER — AMLODIPINE BESYLATE 5 MG/1
5 TABLET ORAL DAILY
Qty: 30 TABLET | Refills: 0 | Status: SHIPPED | OUTPATIENT
Start: 2020-12-02 | End: 2021-01-04 | Stop reason: SDUPTHER

## 2020-12-02 RX ORDER — HYDRALAZINE HYDROCHLORIDE 20 MG/ML
20 INJECTION INTRAMUSCULAR; INTRAVENOUS ONCE
Status: COMPLETED | OUTPATIENT
Start: 2020-12-02 | End: 2020-12-02

## 2020-12-02 RX ORDER — LABETALOL HYDROCHLORIDE 5 MG/ML
20 INJECTION, SOLUTION INTRAVENOUS ONCE
Status: DISCONTINUED | OUTPATIENT
Start: 2020-12-02 | End: 2020-12-02

## 2020-12-02 RX ORDER — SODIUM CHLORIDE 0.9 % (FLUSH) 0.9 %
10 SYRINGE (ML) INJECTION AS NEEDED
Status: DISCONTINUED | OUTPATIENT
Start: 2020-12-02 | End: 2020-12-02 | Stop reason: HOSPADM

## 2020-12-02 RX ADMIN — HYDRALAZINE HYDROCHLORIDE 10 MG: 20 INJECTION INTRAMUSCULAR; INTRAVENOUS at 08:28

## 2020-12-02 NOTE — ED PROVIDER NOTES
Subjective   Patient presents with hypertension this morning.  Evidently took her blood pressure work in the 180s over 110 region.  Presents emergency department 169/105.  Patient having no significant symptoms at this time.  States that her left eye has been somewhat bloodshot last few days compared to the right.  That she just gotten over a cold.  Patient is on no new medications.  Has had no dietary changes.  No chest pain or shortness of breath.  No bowel or bladder changes.  No focal neurologic deficits.          Review of Systems   Constitutional: Negative.  Negative for appetite change, chills and fever.   HENT: Negative.  Negative for congestion.    Eyes: Negative.  Negative for photophobia and visual disturbance.   Respiratory: Negative.  Negative for cough, chest tightness and shortness of breath.    Cardiovascular: Negative.  Negative for chest pain and palpitations.   Gastrointestinal: Negative.  Negative for abdominal pain, constipation, diarrhea, nausea and vomiting.   Endocrine: Negative.    Genitourinary: Negative.  Negative for decreased urine volume, dysuria, flank pain and hematuria.   Musculoskeletal: Negative.  Negative for arthralgias, back pain, myalgias, neck pain and neck stiffness.   Skin: Negative.  Negative for pallor.   Neurological: Negative.  Negative for dizziness, syncope, weakness, light-headedness, numbness and headaches.   Psychiatric/Behavioral: Negative.  Negative for confusion and suicidal ideas. The patient is not nervous/anxious.    All other systems reviewed and are negative.      Past Medical History:   Diagnosis Date   • Abnormal Pap smear of cervix     REPEAT PAP SMEAR NORMAL    • Anxiety 05/27/2016   • Asthma    • Depression    • Endometriosis    • Primary fibromyalgia 05/17/2016   • Tremor 04/22/2015       Allergies   Allergen Reactions   • Fish-Derived Products Nausea And Vomiting   • Adhesive Tape Itching and Rash   • Cymbalta [Duloxetine Hcl] Anxiety       Past  Surgical History:   Procedure Laterality Date   • DIAGNOSTIC LAPAROSCOPY  2006    DR. AGUERO    • ENDOSCOPY  01/25/2016    Normal   • MOUTH SURGERY      [place] teeth extraction   • WISDOM TOOTH EXTRACTION         Family History   Problem Relation Age of Onset   • Hypertension Mother    • Hypertension Father    • Migraines Father    • Hypertension Other    • Asthma Brother    • Migraines Brother    • Heart disease Sister    • Hypertension Paternal Grandmother    • Heart attack Maternal Grandmother    • Hypertension Maternal Grandmother    • Cancer Maternal Grandfather    • Cancer Maternal Uncle    • Cancer Paternal Uncle        Social History     Socioeconomic History   • Marital status:      Spouse name: Not on file   • Number of children: Not on file   • Years of education: Not on file   • Highest education level: Not on file   Tobacco Use   • Smoking status: Current Every Day Smoker     Packs/day: 0.50     Years: 14.00     Pack years: 7.00     Types: Cigarettes     Last attempt to quit: 1/27/2017     Years since quitting: 3.8   • Smokeless tobacco: Never Used   • Tobacco comment: smokes less than a half a pack a day    Substance and Sexual Activity   • Alcohol use: No     Comment: Occassionally pre pregnancy   • Drug use: No   • Sexual activity: Not Currently     Partners: Male     Comment: wants OCP            Objective   Physical Exam  Vitals signs and nursing note reviewed.   Constitutional:       General: She is not in acute distress.     Appearance: She is well-developed. She is not ill-appearing or diaphoretic.   HENT:      Head: Normocephalic and atraumatic.      Nose: Nose normal.   Eyes:      General: No scleral icterus.     Conjunctiva/sclera: Conjunctivae normal.   Neck:      Musculoskeletal: Normal range of motion and neck supple.      Vascular: No JVD.   Cardiovascular:      Rate and Rhythm: Normal rate and regular rhythm.      Heart sounds: Normal heart sounds. No murmur. No friction rub. No  gallop.    Pulmonary:      Effort: Pulmonary effort is normal. No respiratory distress.      Breath sounds: No wheezing or rales.   Chest:      Chest wall: No tenderness.   Abdominal:      General: There is no distension.      Palpations: Abdomen is soft. There is no mass.      Tenderness: There is no abdominal tenderness. There is no guarding or rebound.   Musculoskeletal: Normal range of motion.         General: No tenderness or deformity.   Lymphadenopathy:      Cervical: No cervical adenopathy.   Skin:     General: Skin is warm and dry.      Capillary Refill: Capillary refill takes less than 2 seconds.      Coloration: Skin is not pale.      Findings: No erythema or rash.   Neurological:      General: No focal deficit present.      Mental Status: She is alert and oriented to person, place, and time.      Cranial Nerves: No cranial nerve deficit.      Motor: No abnormal muscle tone.   Psychiatric:         Behavior: Behavior normal.         Thought Content: Thought content normal.         Judgment: Judgment normal.         Procedures           ED Course                                 Labs Reviewed   COMPREHENSIVE METABOLIC PANEL - Abnormal; Notable for the following components:       Result Value    Glucose 104 (*)     All other components within normal limits    Narrative:     GFR Normal >60  Chronic Kidney Disease <60  Kidney Failure <15     CBC WITH AUTO DIFFERENTIAL - Abnormal; Notable for the following components:    WBC 11.53 (*)     RDW 12.1 (*)     Neutrophil % 81.8 (*)     Lymphocyte % 12.7 (*)     Monocyte % 4.6 (*)     Neutrophils, Absolute 9.43 (*)     All other components within normal limits   URINALYSIS W/ MICROSCOPIC IF INDICATED (NO CULTURE) - Normal    Narrative:     Urine microscopic not indicated.   PREGNANCY, URINE - Normal   TROPONIN (IN-HOUSE) - Normal    Narrative:     Troponin T Reference Range:  <= 0.03 ng/mL-   Negative for AMI  >0.03 ng/mL-     Abnormal for myocardial necrosis.   Clinicians would have to utilize clinical acumen, EKG, Troponin and serial changes to determine if it is an Acute Myocardial Infarction or myocardial injury due to an underlying chronic condition.       Results may be falsely decreased if patient taking Biotin.     CBC AND DIFFERENTIAL    Narrative:     The following orders were created for panel order CBC & Differential.  Procedure                               Abnormality         Status                     ---------                               -----------         ------                     CBC Auto Differential[[id number]]        Abnormal            Final result                 Please view results for these tests on the individual orders.   GOLD TOP - SST   EXTRA TUBES    Narrative:     The following orders were created for panel order Extra Tubes.  Procedure                               Abnormality         Status                     ---------                               -----------         ------                     Light Blue Top[[id number]]                                                              Gold Top - SST[[id number]]                                   Final result                 Please view results for these tests on the individual orders.   LIGHT BLUE TOP       No orders to display     Patient with what appears to be essential hypertension.  No end organ damage is noted.  We will start the patient on Norvasc with outpatient interval follow-up for medication titration.          [facility]    Final diagnoses:   Essential hypertension            Brendan Eng MD  12/02/20 1047

## 2020-12-02 NOTE — DISCHARGE INSTRUCTIONS
Measure your blood pressures about 3 times weekly at about the same time daily.  Take those numbers to your primary care provider for further blood pressure medication changes.  Return with any new or worsening symptoms, or any concerns.

## 2021-01-04 DIAGNOSIS — I10 ESSENTIAL HYPERTENSION: Primary | ICD-10-CM

## 2021-01-04 RX ORDER — AMLODIPINE BESYLATE 5 MG/1
5 TABLET ORAL DAILY
Qty: 30 TABLET | Refills: 1 | Status: SHIPPED | OUTPATIENT
Start: 2021-01-04 | End: 2021-01-29 | Stop reason: SDUPTHER

## 2021-01-04 NOTE — TELEPHONE ENCOUNTER
Ligia needs refills on Amlodipine and Zoloft to [facility] in [place], the soonest I could get her an appt was 02-

## 2021-01-04 NOTE — TELEPHONE ENCOUNTER
Last OV with Dr Aguilar 03/08/2019    Next Kendy OV with Dr Gaines 02/19/2021    Last Script by Dr. Brendan Eng  12/02/2020    He gave her a script for Amlodipine  5 mg #30    Zoloft was written by Dr Ana Maria Graves 07/16/2020  #30 with 5 refills

## 2021-01-14 ENCOUNTER — TELEPHONE (OUTPATIENT)
Dept: OBSTETRICS AND GYNECOLOGY | Facility: CLINIC | Age: 36
End: 2021-01-14

## 2021-01-14 NOTE — TELEPHONE ENCOUNTER
Pt called to discuss bc, scheduled her to see nurse practitioner for IUD.  Patient verbalized understanding

## 2021-01-27 ENCOUNTER — OFFICE VISIT (OUTPATIENT)
Dept: OBSTETRICS AND GYNECOLOGY | Facility: CLINIC | Age: 36
End: 2021-01-27

## 2021-01-27 VITALS
HEIGHT: 66 IN | SYSTOLIC BLOOD PRESSURE: 110 MMHG | DIASTOLIC BLOOD PRESSURE: 70 MMHG | WEIGHT: 140 LBS | BODY MASS INDEX: 22.5 KG/M2

## 2021-01-27 DIAGNOSIS — Z30.09 COUNSELING FOR INITIATION OF BIRTH CONTROL METHOD: ICD-10-CM

## 2021-01-27 DIAGNOSIS — Z30.017 NEXPLANON INSERTION: Primary | ICD-10-CM

## 2021-01-27 PROCEDURE — 11981 INSERTION DRUG DLVR IMPLANT: CPT | Performed by: NURSE PRACTITIONER

## 2021-01-27 PROCEDURE — 99213 OFFICE O/P EST LOW 20 MIN: CPT | Performed by: NURSE PRACTITIONER

## 2021-01-27 NOTE — PROGRESS NOTES
Subjective   Ligia Vargas is a 35 y.o. desires to discuss birth control options  Ligia is a  who desires to discuss birth control options. Is thinking about the Nexplanon or IUD. Pt has postpartum depression and is zoloft; it is stable. She is breastfeeding. Has not had unprotected sex in the last 2 weeks. Reports she is prone to yeast infections.     Contraception  This is a new problem. The current episode started today. Pertinent negatives include no abdominal pain, chest pain, chills, fatigue, fever, nausea or rash. Nothing aggravates the symptoms. She has tried nothing for the symptoms. The treatment provided no relief.       The following portions of the patient's history were reviewed and updated as appropriate: allergies, current medications, past family history, past medical history, past social history, past surgical history and problem list.    Review of Systems   Constitutional: Negative for chills, fatigue and fever.   Respiratory: Negative for shortness of breath.    Cardiovascular: Negative for chest pain and palpitations.   Gastrointestinal: Negative for abdominal pain, constipation, diarrhea and nausea.   Genitourinary: Negative for dysuria, frequency, menstrual problem, pelvic pressure, vaginal bleeding, vaginal discharge and vaginal pain.   Skin: Negative for rash.   Neurological: Negative for headache.   Psychiatric/Behavioral: Positive for depressed mood and stress.        Patient's oldest child is a type 1 diabetic and has postpartum depression.        Objective   Physical Exam  Vitals signs and nursing note reviewed.   Constitutional:       Appearance: She is well-developed.   Pulmonary:      Effort: Pulmonary effort is normal.   Musculoskeletal: Normal range of motion.   Skin:     General: Skin is warm and dry.   Neurological:      Mental Status: She is alert and oriented to person, place, and time.   Psychiatric:         Behavior: Behavior normal.           Assessment/Plan    Diagnoses and all orders for this visit:    1. Nexplanon insertion (Primary)    2. Counseling for initiation of birth control method      Counseled extensively on contraceptive options to include Nexplanon, IUD: mirena, kyleena, and paragard, and Depo-Provera. Pt desires to proceed with Nexplanon today. Counseled patient extensively on the bleeding profile and possible increase in mood changes. Pt verbalized understanding.       Nexplanon Insertion NDC # 8798-0201-70    Patient's last menstrual period was 01/20/2021. Negative UPT test today.     Date of procedure:  1/27/2021    Risks and benefits discussed? yes  All questions answered? yes  Consents given by the patient  Written consent obtained? yes    Local anesthesia used:  yes - 3 cc's of  Meds; anesthesia local: None, 1% lidocaine with epinephrine    Procedure documentation:    The upper left arm (non-dominant) was marked at the intended site of insertion. The skin was cleansed with an antiseptic solution.  Local anesthesia was injected.  The Nexplanon was placed subdermally without difficulty.  The devise was able to be palpated in the arm by both myself and Ligia.  The site was cleansed then a 4x4 clean gauze was place over the site of insertion and wrapped with gauze.     She tolerated the procedure well.  There were no complications.  EBL was minimal.    Post procedure instructions: Remove the wrapping in 24 hours and cover with a band aid if still open.    Follow up needed: PRN    This note was electronically signed.    Angi Nolasco, CATHERINE  January 27, 2021

## 2021-01-29 DIAGNOSIS — I10 ESSENTIAL HYPERTENSION: ICD-10-CM

## 2021-01-29 RX ORDER — AMLODIPINE BESYLATE 5 MG/1
5 TABLET ORAL DAILY
Qty: 90 TABLET | Refills: 1 | Status: SHIPPED | OUTPATIENT
Start: 2021-01-29 | End: 2021-05-24

## 2021-02-19 ENCOUNTER — OFFICE VISIT (OUTPATIENT)
Dept: FAMILY MEDICINE CLINIC | Facility: CLINIC | Age: 36
End: 2021-02-19

## 2021-02-19 ENCOUNTER — LAB (OUTPATIENT)
Dept: LAB | Facility: HOSPITAL | Age: 36
End: 2021-02-19

## 2021-02-19 VITALS
WEIGHT: 142 LBS | HEART RATE: 77 BPM | SYSTOLIC BLOOD PRESSURE: 136 MMHG | BODY MASS INDEX: 22.82 KG/M2 | HEIGHT: 66 IN | DIASTOLIC BLOOD PRESSURE: 82 MMHG | OXYGEN SATURATION: 99 %

## 2021-02-19 DIAGNOSIS — R53.83 FATIGUE, UNSPECIFIED TYPE: ICD-10-CM

## 2021-02-19 DIAGNOSIS — Z76.89 ENCOUNTER TO ESTABLISH CARE: ICD-10-CM

## 2021-02-19 DIAGNOSIS — I10 ESSENTIAL HYPERTENSION: Primary | ICD-10-CM

## 2021-02-19 DIAGNOSIS — M54.41 ACUTE BILATERAL LOW BACK PAIN WITH BILATERAL SCIATICA: ICD-10-CM

## 2021-02-19 DIAGNOSIS — I10 ESSENTIAL HYPERTENSION: ICD-10-CM

## 2021-02-19 DIAGNOSIS — M54.42 ACUTE BILATERAL LOW BACK PAIN WITH BILATERAL SCIATICA: ICD-10-CM

## 2021-02-19 LAB
25(OH)D3 SERPL-MCNC: 31.9 NG/ML
ALBUMIN SERPL-MCNC: 4.4 G/DL (ref 3.5–5.2)
ALBUMIN/GLOB SERPL: 1.4 G/DL
ALP SERPL-CCNC: 65 U/L (ref 39–117)
ALT SERPL W P-5'-P-CCNC: 14 U/L (ref 1–33)
ANION GAP SERPL CALCULATED.3IONS-SCNC: 7.6 MMOL/L (ref 5–15)
AST SERPL-CCNC: 18 U/L (ref 1–32)
BASOPHILS # BLD AUTO: 0.05 10*3/MM3 (ref 0–0.2)
BASOPHILS NFR BLD AUTO: 0.6 % (ref 0–1.5)
BILIRUB SERPL-MCNC: 0.3 MG/DL (ref 0–1.2)
BUN SERPL-MCNC: 14 MG/DL (ref 6–20)
BUN/CREAT SERPL: 18.2 (ref 7–25)
CALCIUM SPEC-SCNC: 9.1 MG/DL (ref 8.6–10.5)
CHLORIDE SERPL-SCNC: 107 MMOL/L (ref 98–107)
CO2 SERPL-SCNC: 27.4 MMOL/L (ref 22–29)
CREAT SERPL-MCNC: 0.77 MG/DL (ref 0.57–1)
DEPRECATED RDW RBC AUTO: 38.9 FL (ref 37–54)
EOSINOPHIL # BLD AUTO: 0.17 10*3/MM3 (ref 0–0.4)
EOSINOPHIL NFR BLD AUTO: 2.1 % (ref 0.3–6.2)
ERYTHROCYTE [DISTWIDTH] IN BLOOD BY AUTOMATED COUNT: 11.5 % (ref 12.3–15.4)
FERRITIN SERPL-MCNC: 44.3 NG/ML (ref 13–150)
GFR SERPL CREATININE-BSD FRML MDRD: 104 ML/MIN/1.73
GLOBULIN UR ELPH-MCNC: 3.1 GM/DL
GLUCOSE SERPL-MCNC: 78 MG/DL (ref 65–99)
HCT VFR BLD AUTO: 39.7 % (ref 34–46.6)
HGB BLD-MCNC: 13.5 G/DL (ref 12–15.9)
IMM GRANULOCYTES # BLD AUTO: 0.02 10*3/MM3 (ref 0–0.05)
IMM GRANULOCYTES NFR BLD AUTO: 0.2 % (ref 0–0.5)
IRON 24H UR-MRATE: 109 MCG/DL (ref 37–145)
IRON SATN MFR SERPL: 26 % (ref 20–50)
LYMPHOCYTES # BLD AUTO: 2.54 10*3/MM3 (ref 0.7–3.1)
LYMPHOCYTES NFR BLD AUTO: 31 % (ref 19.6–45.3)
MCH RBC QN AUTO: 32 PG (ref 26.6–33)
MCHC RBC AUTO-ENTMCNC: 34 G/DL (ref 31.5–35.7)
MCV RBC AUTO: 94.1 FL (ref 79–97)
MONOCYTES # BLD AUTO: 0.53 10*3/MM3 (ref 0.1–0.9)
MONOCYTES NFR BLD AUTO: 6.5 % (ref 5–12)
NEUTROPHILS NFR BLD AUTO: 4.88 10*3/MM3 (ref 1.7–7)
NEUTROPHILS NFR BLD AUTO: 59.6 % (ref 42.7–76)
NRBC BLD AUTO-RTO: 0 /100 WBC (ref 0–0.2)
PLATELET # BLD AUTO: 269 10*3/MM3 (ref 140–450)
PMV BLD AUTO: 10.1 FL (ref 6–12)
POTASSIUM SERPL-SCNC: 4.4 MMOL/L (ref 3.5–5.2)
PROT SERPL-MCNC: 7.5 G/DL (ref 6–8.5)
RBC # BLD AUTO: 4.22 10*6/MM3 (ref 3.77–5.28)
SODIUM SERPL-SCNC: 142 MMOL/L (ref 136–145)
TIBC SERPL-MCNC: 417 MCG/DL (ref 298–536)
TRANSFERRIN SERPL-MCNC: 280 MG/DL (ref 200–360)
TSH SERPL DL<=0.05 MIU/L-ACNC: 0.86 UIU/ML (ref 0.27–4.2)
VIT B12 BLD-MCNC: 947 PG/ML (ref 211–946)
WBC # BLD AUTO: 8.19 10*3/MM3 (ref 3.4–10.8)

## 2021-02-19 PROCEDURE — 86038 ANTINUCLEAR ANTIBODIES: CPT

## 2021-02-19 PROCEDURE — 85025 COMPLETE CBC W/AUTO DIFF WBC: CPT

## 2021-02-19 PROCEDURE — 36415 COLL VENOUS BLD VENIPUNCTURE: CPT

## 2021-02-19 PROCEDURE — 82728 ASSAY OF FERRITIN: CPT

## 2021-02-19 PROCEDURE — 83540 ASSAY OF IRON: CPT

## 2021-02-19 PROCEDURE — 82607 VITAMIN B-12: CPT

## 2021-02-19 PROCEDURE — 82306 VITAMIN D 25 HYDROXY: CPT

## 2021-02-19 PROCEDURE — 84443 ASSAY THYROID STIM HORMONE: CPT

## 2021-02-19 PROCEDURE — 99214 OFFICE O/P EST MOD 30 MIN: CPT | Performed by: FAMILY MEDICINE

## 2021-02-19 PROCEDURE — 84466 ASSAY OF TRANSFERRIN: CPT

## 2021-02-19 PROCEDURE — 80053 COMPREHEN METABOLIC PANEL: CPT

## 2021-02-19 RX ORDER — IBUPROFEN 600 MG/1
600 TABLET ORAL EVERY 6 HOURS PRN
Qty: 120 TABLET | Refills: 0 | Status: SHIPPED | OUTPATIENT
Start: 2021-02-19 | End: 2022-03-07 | Stop reason: SDUPTHER

## 2021-02-19 RX ORDER — SERTRALINE HYDROCHLORIDE 100 MG/1
100 TABLET, FILM COATED ORAL DAILY
Qty: 30 TABLET | Refills: 2 | Status: SHIPPED | OUTPATIENT
Start: 2021-02-19 | End: 2021-03-18 | Stop reason: SDUPTHER

## 2021-02-19 NOTE — PROGRESS NOTES
"Chief Complaint  Establish Care, Hypertension, and Depression    Subjective          Ligia Vargas presents to [facility]  History of Present Illness    Patient presents today to [facility].  She is almost 8 months postpartum.  She notes that she has hypertension.  Currently being treated with amlodipine 5 mg daily.  Pressures controlled with this medication.  Patient also has depression.  Treated with Zoloft 50 mg daily at this time.  She reports continued depression symptoms, despite this medication.  Feels like dose needs to be increased.  No adverse effects from the Zoloft.    Patient has been having some back pain.  She went to the urgent care on 1/8/2021 for this complaint.  She was diagnosed with strain.  Given corticosteroid injection and Aleve.  Patient continues to have some pain.  Also having some numbness and tingling in her legs bilaterally.  At home, she has tried lidocaine patches.  She has not tried ice/heat. Has not tried physical therapy or chiropractor.  Patient does not like taking medications, and is very cautious about what she takes at this time as she is breastfeeding.       Migraine headaches - Patient reports having migraine headaches 2 times a month, for 2 to 3 days at a time.  Has tried imitrex in the past.  This worked some.  Never on anything prophylactically.  Has seen neurology in the past.       Objective   Vital Signs:   /82   Pulse 77   Ht 167.6 cm (66\")   Wt 64.4 kg (142 lb)   SpO2 99%   BMI 22.92 kg/m²     Physical Exam  Vitals signs reviewed.   Constitutional:       General: She is not in acute distress.     Appearance: She is well-developed.   HENT:      Head: Normocephalic and atraumatic.   Neck:      Musculoskeletal: Neck supple.   Cardiovascular:      Rate and Rhythm: Normal rate and regular rhythm.      Heart sounds: Normal heart sounds. No murmur.   Pulmonary:      Effort: Pulmonary effort is normal. No respiratory " distress.      Breath sounds: Normal breath sounds. No wheezing or rales.   Abdominal:      Palpations: Abdomen is soft.      Tenderness: There is no abdominal tenderness.   Musculoskeletal:      Lumbar back: She exhibits decreased range of motion and tenderness.      Comments: Negative straight leg raise bilaterally.   Skin:     General: Skin is warm and dry.      Findings: No rash.   Neurological:      Mental Status: She is alert and oriented to person, place, and time.        Result Review :   The following data was reviewed by: Peggy aGines MD on 02/19/2021:  Common labs    Common Labsle 12/2/20 12/2/20    0810 0811   Glucose 104 (A)    BUN 8    Creatinine 0.64    eGFR African Am 129    Sodium 139    Potassium 3.6    Chloride 100    Calcium 9.5    Albumin 4.30    Total Bilirubin 0.3    Alkaline Phosphatase 82    AST (SGOT) 23    ALT (SGPT) 16    WBC  11.53 (A)   Hemoglobin  13.0   Hematocrit  39.2   Platelets  232                  Assessment and Plan    Diagnoses and all orders for this visit:    1. Essential hypertension (Primary)  -     CBC and Differential; Future  -     Comprehensive metabolic panel; Future    2. Postpartum depression  -     sertraline (Zoloft) 100 MG tablet; Take 1 tablet by mouth Daily.  Dispense: 30 tablet; Refill: 2    3. Acute bilateral low back pain with bilateral sciatica  -     CBC and Differential; Future  -     Comprehensive metabolic panel; Future  -     Vitamin B12; Future  -     Vitamin D 25 Hydroxy; Future  -     Ferritin; Future  -     Iron and TIBC; Future  -     JET; Future  -     ibuprofen (ADVIL,MOTRIN) 600 MG tablet; Take 1 tablet by mouth Every 6 (Six) Hours As Needed for Mild Pain .  Dispense: 120 tablet; Refill: 0    4. Fatigue, unspecified type  -     CBC and Differential; Future  -     Comprehensive metabolic panel; Future  -     Vitamin B12; Future  -     Vitamin D 25 Hydroxy; Future  -     Ferritin; Future  -     Iron and TIBC; Future  -     JET; Future  -      TSH; Future    5. Encounter to establish care      Blood pressure controlled  Continue amlodipine 5 mg daily  Postpartum depression symptoms improved  Will increase Zoloft to 100 mg daily  Discussed with patient that fatigue may be related to uncontrolled depression  We will check labs including CBC, CMP, vitamin B12, vitamin D, iron studies, JET and TSH  Bilateral low back pain persistent  Encouraged home exercises, heat and ibuprofen  If unable to get relief with these measures, would encourage patient to consider trial physical therapy         Follow Up   Return in about 6 weeks (around 4/2/2021).  Patient was given instructions and counseling regarding her condition or for health maintenance advice. Please see specific information pulled into the AVS if appropriate.           This document has been electronically signed by Peggy Gaines MD on February 19, 2021 14:53 CST

## 2021-02-22 ENCOUNTER — TELEPHONE (OUTPATIENT)
Dept: FAMILY MEDICINE CLINIC | Facility: CLINIC | Age: 36
End: 2021-02-22

## 2021-02-22 DIAGNOSIS — R79.89 LOW SERUM VITAMIN D: Primary | ICD-10-CM

## 2021-02-22 LAB — ANA SER QL: NEGATIVE

## 2021-02-22 RX ORDER — MELATONIN
1000 DAILY
Qty: 30 TABLET | Refills: 5 | Status: SHIPPED | OUTPATIENT
Start: 2021-02-22 | End: 2021-08-26

## 2021-02-22 NOTE — TELEPHONE ENCOUNTER
"Chief Complaint   Patient presents with     Forms     FLMA form to complete        Initial /74 (BP Location: Left arm, Patient Position: Chair, Cuff Size: Adult Large)  Pulse 60  Temp 98  F (36.7  C) (Oral)  Resp 15  Ht 5' 11.5\" (1.816 m)  Wt 207 lb 3.2 oz (94 kg)  SpO2 100%  BMI 28.5 kg/m2 Estimated body mass index is 28.5 kg/(m^2) as calculated from the following:    Height as of this encounter: 5' 11.5\" (1.816 m).    Weight as of this encounter: 207 lb 3.2 oz (94 kg).  Medication Reconciliation: complete     Christine Marsh CMA      " Called and spoke with patient about labs.  Vitamin D low normal, will provide daily supplement.  Other labs are normal.  Encouraged healthy eating and regular physical activity.  She will call in 1 month with update on how she is feeling.  Liam Gaines

## 2021-03-18 RX ORDER — SERTRALINE HYDROCHLORIDE 100 MG/1
100 TABLET, FILM COATED ORAL DAILY
Qty: 30 TABLET | Refills: 2 | Status: SHIPPED | OUTPATIENT
Start: 2021-03-18 | End: 2021-03-22 | Stop reason: SDUPTHER

## 2021-03-22 RX ORDER — SERTRALINE HYDROCHLORIDE 100 MG/1
100 TABLET, FILM COATED ORAL DAILY
Qty: 90 TABLET | Refills: 3 | Status: SHIPPED | OUTPATIENT
Start: 2021-03-22 | End: 2021-04-16 | Stop reason: SDUPTHER

## 2021-04-02 ENCOUNTER — OFFICE VISIT (OUTPATIENT)
Dept: FAMILY MEDICINE CLINIC | Facility: CLINIC | Age: 36
End: 2021-04-02

## 2021-04-02 VITALS
WEIGHT: 145.7 LBS | HEART RATE: 80 BPM | BODY MASS INDEX: 23.42 KG/M2 | DIASTOLIC BLOOD PRESSURE: 98 MMHG | OXYGEN SATURATION: 100 % | HEIGHT: 66 IN | SYSTOLIC BLOOD PRESSURE: 138 MMHG

## 2021-04-02 DIAGNOSIS — I10 ESSENTIAL HYPERTENSION: Primary | ICD-10-CM

## 2021-04-02 DIAGNOSIS — F41.9 ANXIETY: ICD-10-CM

## 2021-04-02 DIAGNOSIS — G47.00 INSOMNIA, UNSPECIFIED TYPE: ICD-10-CM

## 2021-04-02 PROCEDURE — 99213 OFFICE O/P EST LOW 20 MIN: CPT | Performed by: FAMILY MEDICINE

## 2021-04-02 RX ORDER — HYDROXYZINE HYDROCHLORIDE 10 MG/1
10 TABLET, FILM COATED ORAL NIGHTLY PRN
Qty: 30 TABLET | Refills: 2 | Status: SHIPPED | OUTPATIENT
Start: 2021-04-02 | End: 2021-07-06

## 2021-04-02 NOTE — PROGRESS NOTES
"Chief Complaint  Hypertension (6 week recheck)    Subjective          Ligia Vargas presents to [facility]  History of Present Illness    Patient presents today for 6-week hypertension follow-up.  She notes that her blood pressure has been doing okay at home.  Says that she had an energy drink just before coming in, so believes that is why her blood pressure is elevated in the office today.  Patient is currently taking amlodipine 5 mg daily for her blood pressure.  No problems with this medication.  Patient says that she has been having increased anxiety lately.  She is currently using Zoloft 50 mg daily.  She notes that her anxiety stems from a situation with her young son.  He was diagnosed with type 1 diabetes, and is getting ready to start insulin pump.    Objective   Vital Signs:   /98   Pulse 80   Ht 167.6 cm (66\")   Wt 66.1 kg (145 lb 11.2 oz)   SpO2 100%   BMI 23.52 kg/m²     Physical Exam  Vitals reviewed.   Constitutional:       General: She is not in acute distress.     Appearance: She is well-developed.   HENT:      Head: Normocephalic and atraumatic.   Cardiovascular:      Rate and Rhythm: Normal rate and regular rhythm.      Heart sounds: Normal heart sounds. No murmur heard.     Pulmonary:      Effort: Pulmonary effort is normal. No respiratory distress.      Breath sounds: Normal breath sounds. No wheezing or rales.   Abdominal:      Palpations: Abdomen is soft.      Tenderness: There is no abdominal tenderness.   Musculoskeletal:      Cervical back: Neck supple.   Skin:     General: Skin is warm and dry.      Findings: No rash.   Neurological:      Mental Status: She is alert and oriented to person, place, and time.        Result Review :   The following data was reviewed by: Peggy Gaines MD on 04/02/2021:  Common labs    Common Labsle 2/19/21 2/19/21    1508 1508   Glucose  78   BUN  14   Creatinine  0.77   eGFR African Am  104   Sodium  142 "   Potassium  4.4   Chloride  107   Calcium  9.1   Albumin  4.40   Total Bilirubin  0.3   Alkaline Phosphatase  65   AST (SGOT)  18   ALT (SGPT)  14   WBC 8.19    Hemoglobin 13.5    Hematocrit 39.7    Platelets 269    (A) Abnormal value                      Assessment and Plan    Diagnoses and all orders for this visit:    1. Essential hypertension (Primary)    2. Anxiety  -     hydrOXYzine (ATARAX) 10 MG tablet; Take 1 tablet by mouth At Night As Needed for Anxiety (sleep).  Dispense: 30 tablet; Refill: 2    3. Insomnia, unspecified type  -     hydrOXYzine (ATARAX) 10 MG tablet; Take 1 tablet by mouth At Night As Needed for Anxiety (sleep).  Dispense: 30 tablet; Refill: 2      Patient presents today for follow-up blood pressure.  Diastolic is slightly elevated in the office today.  Patient will continue monitoring at home.  Currently taking Norvasc 5 mg daily.  Encouraged low-salt diet and regular physical activity.  Patient should call with any persistent elevated blood pressures at home.  Increased anxiety and difficulty sleeping due to situational factors with her son.  Continue Zoloft 50 mg daily at this time.  We will add hydroxyzine 10 mg at night as needed for sleep.  Patient agreeable to this plan.          Follow Up   Return in about 3 months (around 7/2/2021) for Recheck.  Patient was given instructions and counseling regarding her condition or for health maintenance advice. Please see specific information pulled into the AVS if appropriate.         This document has been electronically signed by Peggy Gaines MD

## 2021-04-02 NOTE — PATIENT INSTRUCTIONS
Low-FODMAP Eating Plan                      FODMAPs (fermentable oligosaccharides, disaccharides, monosaccharides, and polyols) are sugars that are hard for some people to digest. A low-FODMAP eating plan may help some people who have bowel (intestinal) diseases to manage their symptoms.  This meal plan can be complicated to follow. Work with a diet and nutrition specialist (dietitian) to make a low-FODMAP eating plan that is right for you. A dietitian can make sure that you get enough nutrition from this diet.  What are tips for following this plan?  Reading food labels  · Check labels for hidden FODMAPs such as:  ? High-fructose syrup.  ? Honey.  ? Agave.  ? Natural fruit flavors.  ? Onion or garlic powder.  · Choose low-FODMAP foods that contain 3-4 grams of fiber per serving.  · Check food labels for serving sizes. Eat only one serving at a time to make sure FODMAP levels stay low.  Meal planning  · Follow a low-FODMAP eating plan for up to 6 weeks, or as told by your health care provider or dietitian.  · To follow the eating plan:  1. Eliminate high-FODMAP foods from your diet completely.  2. Gradually reintroduce high-FODMAP foods into your diet one at a time. Most people should wait a few days after introducing one high-FODMAP food before they introduce the next high-FODMAP food. Your dietitian can recommend how quickly you may reintroduce foods.  3. Keep a daily record of what you eat and drink, and make note of any symptoms that you have after eating.  4. Review your daily record with a dietitian regularly. Your dietitian can help you identify which foods you can eat and which foods you should avoid.  General tips  · Drink enough fluid each day to keep your urine pale yellow.  · Avoid processed foods. These often have added sugar and may be high in FODMAPs.  · Avoid most dairy products, whole grains, and sweeteners.  · Work with a dietitian to make sure you get enough fiber in your diet.  Recommended  "foods  Grains  · Gluten-free grains, such as rice, oats, buckwheat, quinoa, corn, polenta, and millet. Gluten-free pasta, bread, or cereal. Rice noodles. Corn tortillas.  Vegetables  · Eggplant, zucchini, cucumber, peppers, green beans, [place] sprouts, bean sprouts, lettuce, arugula, kale, Swiss chard, spinach, fly greens, bok cely, summer squash, potato, and tomato. Limited amounts of corn, carrot, and sweet potato. Green parts of scallions.  Fruits  · Bananas, oranges, cathie, limes, blueberries, raspberries, strawberries, grapes, cantaloupe, honeydew melon, kiwi, papaya, passion fruit, and pineapple. Limited amounts of dried cranberries, banana chips, and shredded coconut.  Dairy  · Lactose-free milk, yogurt, and kefir. Lactose-free cottage cheese and ice cream. Non-dairy milks, such as almond, coconut, hemp, and rice milk. Yogurts made of non-dairy milks. Limited amounts of goat cheese, brie, mozzarella, parmesan, swiss, and other hard cheeses.  Meats and other protein foods  · Unseasoned beef, pork, poultry, or fish. Eggs. [place]. Tofu (firm) and tempeh. Limited amounts of nuts and seeds, such as almonds, walnuts, brazil nuts, pecans, peanuts, pumpkin seeds, roe seeds, and sunflower seeds.  Fats and oils  · Butter-free spreads. Vegetable oils, such as olive, canola, and sunflower oil.  Seasoning and other foods  · Artificial sweeteners with names that do not end in \"ol\" such as aspartame, saccharine, and stevia. Maple syrup, white table sugar, raw sugar, brown sugar, and molasses. Fresh basil, coriander, parsley, rosemary, and thyme.  Beverages  · Water and mineral water. Sugar-sweetened soft drinks. Small amounts of orange juice or cranberry juice. Black and green tea. Most dry alon. Coffee.  This may not be a complete list of low-FODMAP foods. Talk with your dietitian for more information.  Foods to avoid  Grains  · Wheat, including kamut, durum, and semolina. Barley and bulgur. Couscous. Wheat-based " cereals. Wheat noodles, bread, crackers, and pastries.  Vegetables  · Chicory root, artichoke, asparagus, cabbage, snow peas, sugar snap peas, mushrooms, and cauliflower. Onions, garlic, leeks, and the white part of scallions.  Fruits  · Fresh, dried, and juiced forms of apple, pear, watermelon, peach, plum, cherries, apricots, blackberries, boysenberries, figs, nectarines, and sharon. Avocado.  Dairy  · Milk, yogurt, ice cream, and soft cheese. Cream and sour cream. Milk-based sauces. Custard.  Meats and other protein foods  · Fried or fatty meat. Sausage. Cashews and pistachios. Soybeans, baked beans, black beans, chickpeas, kidney beans, viviane beans, navy beans, lentils, and split peas.  Seasoning and other foods  · Any sugar-free gum or candy. Foods that contain artificial sweeteners such as sorbitol, mannitol, isomalt, or xylitol. Foods that contain honey, high-fructose corn syrup, or agave. Bouillon, vegetable stock, beef stock, and chicken stock. Garlic and onion powder. Condiments made with onion, such as hummus, chutney, pickles, relish, salad dressing, and salsa. Tomato paste.  Beverages  · Chicory-based drinks. Coffee substitutes. Chamomile tea. Fennel tea. Sweet or fortified alon such as port or fausto. Diet soft drinks made with isomalt, mannitol, maltitol, sorbitol, or xylitol. Apple, pear, and sharon juice. Juices with high-fructose corn syrup.  This may not be a complete list of high-FODMAP foods. Talk with your dietitian to discuss what dietary choices are best for you.   Summary  · A low-FODMAP eating plan is a short-term diet that eliminates FODMAPs from your diet to help ease symptoms of certain bowel diseases.  · The eating plan usually lasts up to 6 weeks. After that, high-FODMAP foods are restarted gradually, one at a time, so you can find out which may be causing symptoms.  · A low-FODMAP eating plan can be complicated. It is best to work with a dietitian who has experience with this type of  plan.  This information is not intended to replace advice given to you by your health care provider. Make sure you discuss any questions you have with your health care provider.  Document Revised: 11/30/2018 Document Reviewed: 08/14/2018  Elsevier Patient Education © 2021 [employer]

## 2021-04-16 RX ORDER — SERTRALINE HYDROCHLORIDE 100 MG/1
50 TABLET, FILM COATED ORAL DAILY
Qty: 90 TABLET | Refills: 3 | Status: SHIPPED | OUTPATIENT
Start: 2021-04-16 | End: 2021-05-21

## 2021-05-21 ENCOUNTER — PATIENT MESSAGE (OUTPATIENT)
Dept: FAMILY MEDICINE CLINIC | Facility: CLINIC | Age: 36
End: 2021-05-21

## 2021-05-24 DIAGNOSIS — I10 ESSENTIAL HYPERTENSION: ICD-10-CM

## 2021-05-24 RX ORDER — AMLODIPINE BESYLATE 5 MG/1
TABLET ORAL
Qty: 90 TABLET | Refills: 1 | Status: SHIPPED | OUTPATIENT
Start: 2021-05-24 | End: 2021-07-06 | Stop reason: SDUPTHER

## 2021-07-06 ENCOUNTER — OFFICE VISIT (OUTPATIENT)
Dept: FAMILY MEDICINE CLINIC | Facility: CLINIC | Age: 36
End: 2021-07-06

## 2021-07-06 VITALS
HEIGHT: 66 IN | WEIGHT: 159 LBS | SYSTOLIC BLOOD PRESSURE: 140 MMHG | BODY MASS INDEX: 25.55 KG/M2 | OXYGEN SATURATION: 100 % | DIASTOLIC BLOOD PRESSURE: 100 MMHG | HEART RATE: 69 BPM

## 2021-07-06 DIAGNOSIS — F41.1 GENERALIZED ANXIETY DISORDER: ICD-10-CM

## 2021-07-06 DIAGNOSIS — L30.9 DERMATITIS: ICD-10-CM

## 2021-07-06 DIAGNOSIS — I10 ESSENTIAL HYPERTENSION: Primary | ICD-10-CM

## 2021-07-06 DIAGNOSIS — F33.0 MILD EPISODE OF RECURRENT MAJOR DEPRESSIVE DISORDER (HCC): ICD-10-CM

## 2021-07-06 PROCEDURE — 99214 OFFICE O/P EST MOD 30 MIN: CPT | Performed by: FAMILY MEDICINE

## 2021-07-06 RX ORDER — AMLODIPINE BESYLATE 5 MG/1
5 TABLET ORAL DAILY
Qty: 90 TABLET | Refills: 1 | Status: SHIPPED | OUTPATIENT
Start: 2021-07-06 | End: 2021-11-11

## 2021-07-06 RX ORDER — TRIAMCINOLONE ACETONIDE 40 MG/ML
40 INJECTION, SUSPENSION INTRA-ARTICULAR; INTRAMUSCULAR ONCE
Status: DISCONTINUED | OUTPATIENT
Start: 2021-07-06 | End: 2021-10-27

## 2021-07-06 RX ORDER — SERTRALINE HYDROCHLORIDE 25 MG/1
25 TABLET, FILM COATED ORAL DAILY
Qty: 90 TABLET | Refills: 1 | Status: SHIPPED | OUTPATIENT
Start: 2021-07-06 | End: 2022-03-07

## 2021-07-06 NOTE — PROGRESS NOTES
"Chief Complaint  Hypertension (3 month recheck)    Subjective          Ligia Vargas presents to [facility]  History of Present Illness    Patient presents today for follow up.  Says that she stopped taking all of her medications.  She does not like taking medication on a daily basis.  She does not check blood pressure at home.  Sometimes has headaches, and not sure if that is related to elevated BP or not.  Previously she was taking norvasc 5 mg daily.  No problems with this medication.      Patient is having increased anxiety.  Depression present as well.  Patient's young son has fairly recent diagnosis of diabetes, and patient has been having to care for his medical needs including insulin pump management.  She has a lot on her plate with working 5-6 days a week on top of her responsibilities at home.  She was previously taking Zoloft 50 mg daily.  Says that some people thought she was not acting herself.  Patient says that she felt \"too chill\".     Patient has rash between legs and on upper thighs.  Small bumps.  Worse at times, especially with heat.  Patient does not think she has changed anything in her routine.      Objective   Vital Signs:   /100   Pulse 69   Ht 167.6 cm (66\")   Wt 72.1 kg (159 lb)   SpO2 100%   BMI 25.66 kg/m²     Physical Exam  Vitals reviewed.   Constitutional:       General: She is not in acute distress.     Appearance: She is well-developed.   HENT:      Head: Normocephalic and atraumatic.   Cardiovascular:      Rate and Rhythm: Normal rate and regular rhythm.      Heart sounds: Normal heart sounds. No murmur heard.     Pulmonary:      Effort: Pulmonary effort is normal. No respiratory distress.      Breath sounds: Normal breath sounds. No wheezing or rales.   Abdominal:      Palpations: Abdomen is soft.      Tenderness: There is no abdominal tenderness.   Musculoskeletal:      Cervical back: Neck supple.   Skin:     General: Skin is " warm and dry.      Findings: Rash (fine papular rash scattered on proximal lower extremities - most noticeable on anterio surface and medical aspect between legs) present.   Neurological:      Mental Status: She is alert and oriented to person, place, and time.        Result Review :{Labs  Result Review  Imaging  Med Tab  Media  Procedures :23}   The following data was reviewed by: Peggy Gaines MD on 07/06/2021:  Common labs    Common Labsle 12/2/20 12/2/20 2/19/21 2/19/21    0810 0811 1508 1508   Glucose 104 (A)   78   BUN 8   14   Creatinine 0.64   0.77   eGFR African Am 129   104   Sodium 139   142   Potassium 3.6   4.4   Chloride 100   107   Calcium 9.5   9.1   Albumin 4.30   4.40   Total Bilirubin 0.3   0.3   Alkaline Phosphatase 82   65   AST (SGOT) 23   18   ALT (SGPT) 16   14   WBC  11.53 (A) 8.19    Hemoglobin  13.0 13.5    Hematocrit  39.2 39.7    Platelets  232 269    (A) Abnormal value                      Assessment and Plan    Diagnoses and all orders for this visit:    1. Essential hypertension (Primary)  -     amLODIPine (NORVASC) 5 MG tablet; Take 1 tablet by mouth Daily.  Dispense: 90 tablet; Refill: 1    2. Generalized anxiety disorder    3. Mild episode of recurrent major depressive disorder (CMS/HCC)  -     sertraline (Zoloft) 25 MG tablet; Take 1 tablet by mouth Daily.  Dispense: 90 tablet; Refill: 1    4. Dermatitis  -     triamcinolone acetonide (KENALOG-40) injection 40 mg      Patient presents today for follow up  Strongly encouraged medication compliance  Brainstormed with patient ways to make her medication use less of a hassle  She will try taking medications on her morning break at work  Amlodipine 5 mg daily for blood pressure  Discussed short and long term complications of uncontrolled blood pressure  Will decrease Zoloft to 25 mg daily to help with anxiety and depression  Option for counseling discussed, however patient does not feel she would have time to put this into  her schedule right now - will consider in the future  Kenlalog injection for dermatitis, patient has tolerated in the past  Recommended continuation of allegra as needed for allergy symptoms, may help with itching          Follow Up   Return in about 3 months (around 10/6/2021) for Recheck.  Patient was given instructions and counseling regarding her condition or for health maintenance advice. Please see specific information pulled into the AVS if appropriate.           This document has been electronically signed by Peggy Gaines MD

## 2021-07-27 PROCEDURE — 87635 SARS-COV-2 COVID-19 AMP PRB: CPT | Performed by: FAMILY MEDICINE

## 2021-08-26 DIAGNOSIS — R79.89 LOW SERUM VITAMIN D: ICD-10-CM

## 2021-08-26 RX ORDER — MELATONIN
Qty: 90 TABLET | Refills: 1 | Status: SHIPPED | OUTPATIENT
Start: 2021-08-26 | End: 2023-03-03

## 2021-09-26 PROCEDURE — U0004 COV-19 TEST NON-CDC HGH THRU: HCPCS | Performed by: NURSE PRACTITIONER

## 2021-10-13 ENCOUNTER — OFFICE VISIT (OUTPATIENT)
Dept: FAMILY MEDICINE CLINIC | Facility: CLINIC | Age: 36
End: 2021-10-13

## 2021-10-13 VITALS
WEIGHT: 159 LBS | HEIGHT: 66 IN | HEART RATE: 76 BPM | DIASTOLIC BLOOD PRESSURE: 100 MMHG | OXYGEN SATURATION: 100 % | SYSTOLIC BLOOD PRESSURE: 126 MMHG | BODY MASS INDEX: 25.55 KG/M2

## 2021-10-13 DIAGNOSIS — M79.10 MUSCLE PAIN: Primary | ICD-10-CM

## 2021-10-13 DIAGNOSIS — F33.0 MILD EPISODE OF RECURRENT MAJOR DEPRESSIVE DISORDER (HCC): ICD-10-CM

## 2021-10-13 DIAGNOSIS — M76.30 ILIOTIBIAL BAND SYNDROME, UNSPECIFIED LATERALITY: ICD-10-CM

## 2021-10-13 DIAGNOSIS — I10 ESSENTIAL HYPERTENSION: ICD-10-CM

## 2021-10-13 PROCEDURE — 99214 OFFICE O/P EST MOD 30 MIN: CPT | Performed by: FAMILY MEDICINE

## 2021-10-13 NOTE — PROGRESS NOTES
"Chief Complaint  Hypertension and Leg Pain (bilateral leg)    Subjective          Ligia Vargas presents to [facility] [place]  History of Present Illness    Patient presents today for follow up hypertension and bilateral leg pain.  Patient is taking amlodipine 5 mg daily for blood pressure. Tolerating Zoloft for depression.  Leg pain is in thighs.  Feels like muscle ache/soreness.  She has used some OTC analgesics.      Objective   Vital Signs:   /100   Pulse 76   Ht 167.6 cm (66\")   Wt 72.1 kg (159 lb)   SpO2 100%   BMI 25.66 kg/m²     Physical Exam  Vitals reviewed.   Constitutional:       General: She is not in acute distress.     Appearance: She is well-developed.   Cardiovascular:      Rate and Rhythm: Normal rate and regular rhythm.      Heart sounds: Normal heart sounds. No murmur heard.      Pulmonary:      Effort: Pulmonary effort is normal. No respiratory distress.      Breath sounds: Normal breath sounds. No wheezing or rales.   Abdominal:      Palpations: Abdomen is soft.      Tenderness: There is no abdominal tenderness.   Musculoskeletal:      Right upper leg: Tenderness (along lateral aspect of thigh) present. No bony tenderness.      Left upper leg: Tenderness (along lateral aspect of thigh) present. No bony tenderness.   Skin:     General: Skin is warm and dry.      Findings: No rash.   Neurological:      Mental Status: She is alert and oriented to person, place, and time.        Result Review :   The following data was reviewed by: Peggy Gaines MD on 10/13/2021:  Common labs    Common Labsle 12/2/20 12/2/20 2/19/21 2/19/21    0810 0811 1508 1508   Glucose 104 (A)   78   BUN 8   14   Creatinine 0.64   0.77   eGFR African Am 129   104   Sodium 139   142   Potassium 3.6   4.4   Chloride 100   107   Calcium 9.5   9.1   Albumin 4.30   4.40   Total Bilirubin 0.3   0.3   Alkaline Phosphatase 82   65   AST (SGOT) 23   18   ALT " (SGPT) 16   14   WBC  11.53 (A) 8.19    Hemoglobin  13.0 13.5    Hematocrit  39.2 39.7    Platelets  232 269                   Assessment and Plan    Diagnoses and all orders for this visit:    1. Muscle pain (Primary)    2. Iliotibial band syndrome, unspecified laterality    3. Essential hypertension    4. Mild episode of recurrent major depressive disorder (HCC)      Patient seen today for follow up  Muscle pains most consistent with IT band syndrome  Home exercises provided  Recommended rest, ice and continued use of OTC analgesics  Follow up in 2 weeks  Blood pressure, diastolic elevated today  Continue amlodipine - home monitoring  Return in 2 weeks for recheck  Depression symptoms controlled, continue Zoloft          Follow Up   Return in about 2 weeks (around 10/27/2021) for Recheck.  Patient was given instructions and counseling regarding her condition or for health maintenance advice. Please see specific information pulled into the AVS if appropriate.         This document has been electronically signed by Peggy Gaines MD

## 2021-10-13 NOTE — PATIENT INSTRUCTIONS
Iliotibial Band Syndrome Rehab  Ask your health care provider which exercises are safe for you. Do exercises exactly as told by your health care provider and adjust them as directed. It is normal to feel mild stretching, pulling, tightness, or discomfort as you do these exercises. Stop right away if you feel sudden pain or your pain gets significantly worse. Do not begin these exercises until told by your health care provider.  Stretching and range-of-motion exercises  These exercises warm up your muscles and joints and improve the movement and flexibility of your hip and pelvis.  Quadriceps stretch, prone    1. Lie on your abdomen (prone position) on a firm surface, such as a bed or padded floor.  2. Bend your left / right knee and reach back to hold your ankle or pant leg. If you cannot reach your ankle or pant leg, loop a belt around your foot and grab the belt instead.  3. Gently pull your heel toward your buttocks. Your knee should not slide out to the side. You should feel a stretch in the front of your thigh and knee (quadriceps).  4. Hold this position for __________ seconds.  Repeat __________ times. Complete this exercise __________ times a day.  Iliotibial band stretch  An iliotibial band is a strong band of muscle tissue that runs from the outer side of your hip to the outer side of your thigh and knee.  1. Lie on your side with your left / right leg in the top position.  2. Bend both of your knees and grab your left / right ankle. Stretch out your bottom arm to help you balance.  3. Slowly bring your top knee back so your thigh goes behind your trunk.  4. Slowly lower your top leg toward the floor until you feel a gentle stretch on the outside of your left / right hip and thigh. If you do not feel a stretch and your knee will not fall farther, place the heel of your other foot on top of your knee and pull your knee down toward the floor with your foot.  5. Hold this position for __________  seconds.  Repeat __________ times. Complete this exercise __________ times a day.  Strengthening exercises  These exercises build strength and endurance in your hip and pelvis. Endurance is the ability to use your muscles for a long time, even after they get tired.  Straight leg raises, side-lying  This exercise strengthens the muscles that rotate the leg at the hip and move it away from your body (hip abductors).  1. Lie on your side with your left / right leg in the top position. Lie so your head, shoulder, hip, and knee line up. You may bend your bottom knee to help you balance.  2. Roll your hips slightly forward so your hips are stacked directly over each other and your left / right knee is facing forward.  3. Tense the muscles in your outer thigh and lift your top leg 4-6 inches (10-15 cm).  4. Hold this position for __________ seconds.  5. Slowly lower your leg to return to the starting position. Let your muscles relax completely before doing another repetition.  Repeat __________ times. Complete this exercise __________ times a day.  Leg raises, prone  This exercise strengthens the muscles that move the hips backward (hip extensors).  1. Lie on your abdomen (prone position) on your bed or a firm surface. You can put a pillow under your hips if that is more comfortable for your lower back.  2. Bend your left / right knee so your foot is straight up in the air.  3. Squeeze your buttocks muscles and lift your left / right thigh off the bed. Do not let your back arch.  4. Tense your thigh muscle as hard as you can without increasing any knee pain.  5. Hold this position for __________ seconds.  6. Slowly lower your leg to return to the starting position and allow it to relax completely.  Repeat __________ times. Complete this exercise __________ times a day.  Hip hike  1. Stand sideways on a bottom step. Stand on your left / right leg with your other foot unsupported next to the step. You can hold on to a  railing or wall for balance if needed.  2. Keep your knees straight and your torso square. Then lift your left / right hip up toward the ceiling.  3. Slowly let your left / right hip lower toward the floor, past the starting position. Your foot should get closer to the floor. Do not lean or bend your knees.  Repeat __________ times. Complete this exercise __________ times a day.  This information is not intended to replace advice given to you by your health care provider. Make sure you discuss any questions you have with your health care provider.  Document Revised: 02/24/2021 Document Reviewed: 02/24/2021  Elsevier Patient Education © 2021 [employer]

## 2021-10-27 ENCOUNTER — LAB (OUTPATIENT)
Dept: LAB | Facility: HOSPITAL | Age: 36
End: 2021-10-27

## 2021-10-27 ENCOUNTER — OFFICE VISIT (OUTPATIENT)
Dept: FAMILY MEDICINE CLINIC | Facility: CLINIC | Age: 36
End: 2021-10-27

## 2021-10-27 VITALS
DIASTOLIC BLOOD PRESSURE: 90 MMHG | WEIGHT: 153 LBS | HEART RATE: 77 BPM | HEIGHT: 66 IN | SYSTOLIC BLOOD PRESSURE: 140 MMHG | OXYGEN SATURATION: 93 % | BODY MASS INDEX: 24.59 KG/M2

## 2021-10-27 DIAGNOSIS — M79.10 MUSCLE PAIN: ICD-10-CM

## 2021-10-27 DIAGNOSIS — R53.83 FATIGUE, UNSPECIFIED TYPE: ICD-10-CM

## 2021-10-27 DIAGNOSIS — R53.83 FATIGUE, UNSPECIFIED TYPE: Primary | ICD-10-CM

## 2021-10-27 PROCEDURE — 85025 COMPLETE CBC W/AUTO DIFF WBC: CPT

## 2021-10-27 PROCEDURE — 99213 OFFICE O/P EST LOW 20 MIN: CPT | Performed by: FAMILY MEDICINE

## 2021-10-27 PROCEDURE — 86038 ANTINUCLEAR ANTIBODIES: CPT

## 2021-10-27 PROCEDURE — 82306 VITAMIN D 25 HYDROXY: CPT

## 2021-10-27 PROCEDURE — 82728 ASSAY OF FERRITIN: CPT

## 2021-10-27 PROCEDURE — 96372 THER/PROPH/DIAG INJ SC/IM: CPT | Performed by: FAMILY MEDICINE

## 2021-10-27 PROCEDURE — 83540 ASSAY OF IRON: CPT

## 2021-10-27 PROCEDURE — 80053 COMPREHEN METABOLIC PANEL: CPT

## 2021-10-27 PROCEDURE — 36415 COLL VENOUS BLD VENIPUNCTURE: CPT

## 2021-10-27 PROCEDURE — 86431 RHEUMATOID FACTOR QUANT: CPT

## 2021-10-27 PROCEDURE — 84466 ASSAY OF TRANSFERRIN: CPT

## 2021-10-27 PROCEDURE — 86140 C-REACTIVE PROTEIN: CPT

## 2021-10-27 PROCEDURE — 82607 VITAMIN B-12: CPT

## 2021-10-27 PROCEDURE — 84443 ASSAY THYROID STIM HORMONE: CPT

## 2021-10-27 RX ORDER — GABAPENTIN 100 MG/1
100 CAPSULE ORAL 3 TIMES DAILY
Qty: 90 CAPSULE | Refills: 0 | OUTPATIENT
Start: 2021-10-27 | End: 2022-10-05

## 2021-10-27 RX ORDER — TRIAMCINOLONE ACETONIDE 40 MG/ML
60 INJECTION, SUSPENSION INTRA-ARTICULAR; INTRAMUSCULAR ONCE
Status: COMPLETED | OUTPATIENT
Start: 2021-10-27 | End: 2021-10-27

## 2021-10-27 RX ADMIN — TRIAMCINOLONE ACETONIDE 60 MG: 40 INJECTION, SUSPENSION INTRA-ARTICULAR; INTRAMUSCULAR at 17:04

## 2021-10-28 LAB
25(OH)D3 SERPL-MCNC: 20.8 NG/ML
ALBUMIN SERPL-MCNC: 4.8 G/DL (ref 3.5–5.2)
ALBUMIN/GLOB SERPL: 1.7 G/DL
ALP SERPL-CCNC: 61 U/L (ref 39–117)
ALT SERPL W P-5'-P-CCNC: 13 U/L (ref 1–33)
ANION GAP SERPL CALCULATED.3IONS-SCNC: 11 MMOL/L (ref 5–15)
AST SERPL-CCNC: 19 U/L (ref 1–32)
BASOPHILS # BLD AUTO: 0.05 10*3/MM3 (ref 0–0.2)
BASOPHILS NFR BLD AUTO: 0.7 % (ref 0–1.5)
BILIRUB SERPL-MCNC: 0.3 MG/DL (ref 0–1.2)
BUN SERPL-MCNC: 8 MG/DL (ref 6–20)
BUN/CREAT SERPL: 12.1 (ref 7–25)
CALCIUM SPEC-SCNC: 9.5 MG/DL (ref 8.6–10.5)
CHLORIDE SERPL-SCNC: 104 MMOL/L (ref 98–107)
CHROMATIN AB SERPL-ACNC: <10 IU/ML (ref 0–14)
CO2 SERPL-SCNC: 25 MMOL/L (ref 22–29)
CREAT SERPL-MCNC: 0.66 MG/DL (ref 0.57–1)
CRP SERPL-MCNC: <0.3 MG/DL (ref 0–0.5)
DEPRECATED RDW RBC AUTO: 40.2 FL (ref 37–54)
EOSINOPHIL # BLD AUTO: 0.11 10*3/MM3 (ref 0–0.4)
EOSINOPHIL NFR BLD AUTO: 1.5 % (ref 0.3–6.2)
ERYTHROCYTE [DISTWIDTH] IN BLOOD BY AUTOMATED COUNT: 11.6 % (ref 12.3–15.4)
FERRITIN SERPL-MCNC: 74.6 NG/ML (ref 13–150)
GFR SERPL CREATININE-BSD FRML MDRD: 124 ML/MIN/1.73
GLOBULIN UR ELPH-MCNC: 2.9 GM/DL
GLUCOSE SERPL-MCNC: 82 MG/DL (ref 65–99)
HCT VFR BLD AUTO: 40.4 % (ref 34–46.6)
HGB BLD-MCNC: 13.1 G/DL (ref 12–15.9)
IMM GRANULOCYTES # BLD AUTO: 0.01 10*3/MM3 (ref 0–0.05)
IMM GRANULOCYTES NFR BLD AUTO: 0.1 % (ref 0–0.5)
IRON 24H UR-MRATE: 55 MCG/DL (ref 37–145)
IRON SATN MFR SERPL: 15 % (ref 20–50)
LYMPHOCYTES # BLD AUTO: 3.24 10*3/MM3 (ref 0.7–3.1)
LYMPHOCYTES NFR BLD AUTO: 43.5 % (ref 19.6–45.3)
MCH RBC QN AUTO: 30.5 PG (ref 26.6–33)
MCHC RBC AUTO-ENTMCNC: 32.4 G/DL (ref 31.5–35.7)
MCV RBC AUTO: 94 FL (ref 79–97)
MONOCYTES # BLD AUTO: 0.51 10*3/MM3 (ref 0.1–0.9)
MONOCYTES NFR BLD AUTO: 6.8 % (ref 5–12)
NEUTROPHILS NFR BLD AUTO: 3.53 10*3/MM3 (ref 1.7–7)
NEUTROPHILS NFR BLD AUTO: 47.4 % (ref 42.7–76)
NRBC BLD AUTO-RTO: 0 /100 WBC (ref 0–0.2)
PLATELET # BLD AUTO: 261 10*3/MM3 (ref 140–450)
PMV BLD AUTO: 10.8 FL (ref 6–12)
POTASSIUM SERPL-SCNC: 4 MMOL/L (ref 3.5–5.2)
PROT SERPL-MCNC: 7.7 G/DL (ref 6–8.5)
RBC # BLD AUTO: 4.3 10*6/MM3 (ref 3.77–5.28)
SODIUM SERPL-SCNC: 140 MMOL/L (ref 136–145)
TIBC SERPL-MCNC: 356 MCG/DL (ref 298–536)
TRANSFERRIN SERPL-MCNC: 239 MG/DL (ref 200–360)
TSH SERPL DL<=0.05 MIU/L-ACNC: 1.07 UIU/ML (ref 0.27–4.2)
VIT B12 BLD-MCNC: 720 PG/ML (ref 211–946)
WBC # BLD AUTO: 7.45 10*3/MM3 (ref 3.4–10.8)

## 2021-10-29 ENCOUNTER — TELEPHONE (OUTPATIENT)
Dept: FAMILY MEDICINE CLINIC | Facility: CLINIC | Age: 36
End: 2021-10-29

## 2021-10-29 DIAGNOSIS — R79.89 LOW SERUM VITAMIN D: Primary | ICD-10-CM

## 2021-10-29 LAB — ANA SER QL: NEGATIVE

## 2021-10-29 RX ORDER — ERGOCALCIFEROL 1.25 MG/1
50000 CAPSULE ORAL WEEKLY
Qty: 8 CAPSULE | Refills: 1 | Status: SHIPPED | OUTPATIENT
Start: 2021-10-29 | End: 2022-03-07 | Stop reason: SDUPTHER

## 2021-10-29 NOTE — TELEPHONE ENCOUNTER
Per Dr. Gaines, Ms. Vargas has been called with recent lab results & recommendations.  Continue current medications and follow-up as planned or sooner if any problems.       ----- Message from Peggy Gaines MD sent at 10/29/2021  8:05 AM CDT -----  Please let patient know that vitamin D is low, needs weekly supplement.  This has been sent to the pharmacy.  B12 level is ok.  Labs done to check for rheumatoid and thyroid are normal.  CMP is ok.  Iron studies are ok, but will need repeat monitoring at next visit.  Thanks, DENG Gaines

## 2021-10-29 NOTE — PROGRESS NOTES
Per Dr. Gaines, Ms. Vargas has been called with recent lab results & recommendations.  Continue current medications and follow-up as planned or sooner if any problems.

## 2021-11-04 ENCOUNTER — TELEPHONE (OUTPATIENT)
Dept: FAMILY MEDICINE CLINIC | Facility: CLINIC | Age: 36
End: 2021-11-04

## 2021-11-05 NOTE — TELEPHONE ENCOUNTER
Per Dr. Gaines, Ms. Vargas has been called with recent lab results and recommendations.   Continue current medications and follow-up as planned or sooner if possible.

## 2021-11-11 ENCOUNTER — TELEPHONE (OUTPATIENT)
Dept: FAMILY MEDICINE CLINIC | Facility: CLINIC | Age: 36
End: 2021-11-11

## 2021-11-11 RX ORDER — AMLODIPINE BESYLATE 10 MG/1
10 TABLET ORAL DAILY
Qty: 30 TABLET | Refills: 2 | Status: SHIPPED | OUTPATIENT
Start: 2021-11-11 | End: 2022-01-14

## 2021-11-11 NOTE — TELEPHONE ENCOUNTER
Ms. Vargas called this morning stating that her BP @ 6:50 this morning was 147/97 HR 72.  At 9:48 it was 134/93 HR 76    She states she is having headaches she feels is related to her elevate B/P    She is wanting to be seen or talk with you about what she needs to do.     She is at [phone]

## 2021-11-11 NOTE — TELEPHONE ENCOUNTER
Please ask that patient increase her amlodipine to 10 mg daily.  Can take extra tablet today, and then 2 - 5mg tabs until she picks up new script.  Continue to monitor closely.  Can schedule for appointment tomorrow if she wishes.  Thanks, DENG Gaines

## 2021-11-12 ENCOUNTER — OFFICE VISIT (OUTPATIENT)
Dept: FAMILY MEDICINE CLINIC | Facility: CLINIC | Age: 36
End: 2021-11-12

## 2021-11-12 VITALS
HEART RATE: 88 BPM | SYSTOLIC BLOOD PRESSURE: 123 MMHG | BODY MASS INDEX: 24.27 KG/M2 | HEIGHT: 66 IN | WEIGHT: 151 LBS | OXYGEN SATURATION: 100 % | DIASTOLIC BLOOD PRESSURE: 96 MMHG

## 2021-11-12 DIAGNOSIS — R11.0 NAUSEA: ICD-10-CM

## 2021-11-12 DIAGNOSIS — R07.89 CHEST TIGHTNESS: ICD-10-CM

## 2021-11-12 DIAGNOSIS — G44.52 NEW DAILY PERSISTENT HEADACHE: ICD-10-CM

## 2021-11-12 DIAGNOSIS — I10 ESSENTIAL HYPERTENSION: Primary | ICD-10-CM

## 2021-11-12 LAB
QT INTERVAL: 386 MS
QTC INTERVAL: 434 MS

## 2021-11-12 PROCEDURE — 96372 THER/PROPH/DIAG INJ SC/IM: CPT | Performed by: FAMILY MEDICINE

## 2021-11-12 PROCEDURE — 93010 ELECTROCARDIOGRAM REPORT: CPT | Performed by: INTERNAL MEDICINE

## 2021-11-12 PROCEDURE — 99214 OFFICE O/P EST MOD 30 MIN: CPT | Performed by: FAMILY MEDICINE

## 2021-11-12 PROCEDURE — 93005 ELECTROCARDIOGRAM TRACING: CPT | Performed by: FAMILY MEDICINE

## 2021-11-12 RX ORDER — LOSARTAN POTASSIUM 25 MG/1
25 TABLET ORAL DAILY
Qty: 30 TABLET | Refills: 2 | Status: SHIPPED | OUTPATIENT
Start: 2021-11-12 | End: 2022-04-22

## 2021-11-12 RX ORDER — PROMETHAZINE HYDROCHLORIDE 25 MG/1
12.5-25 TABLET ORAL EVERY 6 HOURS PRN
Qty: 30 TABLET | Refills: 2 | Status: SHIPPED | OUTPATIENT
Start: 2021-11-12 | End: 2022-03-07

## 2021-11-12 RX ORDER — PROMETHAZINE HYDROCHLORIDE 25 MG/ML
12.5 INJECTION, SOLUTION INTRAMUSCULAR; INTRAVENOUS ONCE
Status: COMPLETED | OUTPATIENT
Start: 2021-11-12 | End: 2021-11-12

## 2021-11-12 RX ADMIN — PROMETHAZINE HYDROCHLORIDE 12.5 MG: 25 INJECTION, SOLUTION INTRAMUSCULAR; INTRAVENOUS at 13:58

## 2021-11-16 ENCOUNTER — HOSPITAL ENCOUNTER (OUTPATIENT)
Dept: CT IMAGING | Facility: HOSPITAL | Age: 36
Discharge: HOME | End: 2021-11-16
Admitting: FAMILY MEDICINE

## 2021-11-16 DIAGNOSIS — G44.52 NEW DAILY PERSISTENT HEADACHE: ICD-10-CM

## 2021-11-16 PROCEDURE — 70450 CT HEAD/BRAIN W/O DYE: CPT

## 2021-11-18 ENCOUNTER — TELEPHONE (OUTPATIENT)
Dept: FAMILY MEDICINE CLINIC | Facility: CLINIC | Age: 36
End: 2021-11-18

## 2021-11-18 NOTE — TELEPHONE ENCOUNTER
Per Dr. Gaines, Ms. Vargas has been called with recent Head CT results & recommendations.  Continue current medications and follow-up as planned or sooner if any problems.

## 2021-12-08 ENCOUNTER — OFFICE VISIT (OUTPATIENT)
Dept: FAMILY MEDICINE CLINIC | Facility: CLINIC | Age: 36
End: 2021-12-08

## 2021-12-08 VITALS
HEIGHT: 66 IN | DIASTOLIC BLOOD PRESSURE: 84 MMHG | HEART RATE: 74 BPM | BODY MASS INDEX: 24.11 KG/M2 | SYSTOLIC BLOOD PRESSURE: 130 MMHG | OXYGEN SATURATION: 98 % | WEIGHT: 150 LBS

## 2021-12-08 DIAGNOSIS — F33.0 MILD EPISODE OF RECURRENT MAJOR DEPRESSIVE DISORDER: ICD-10-CM

## 2021-12-08 DIAGNOSIS — Z23 INFLUENZA VACCINE ADMINISTERED: ICD-10-CM

## 2021-12-08 DIAGNOSIS — I10 ESSENTIAL HYPERTENSION: Primary | ICD-10-CM

## 2021-12-08 DIAGNOSIS — F41.1 GENERALIZED ANXIETY DISORDER: ICD-10-CM

## 2021-12-08 PROCEDURE — 99213 OFFICE O/P EST LOW 20 MIN: CPT | Performed by: FAMILY MEDICINE

## 2021-12-08 PROCEDURE — 90471 IMMUNIZATION ADMIN: CPT | Performed by: FAMILY MEDICINE

## 2021-12-08 PROCEDURE — 90686 IIV4 VACC NO PRSV 0.5 ML IM: CPT | Performed by: FAMILY MEDICINE

## 2022-01-14 RX ORDER — AMLODIPINE BESYLATE 10 MG/1
TABLET ORAL
Qty: 90 TABLET | Refills: 3 | Status: SHIPPED | OUTPATIENT
Start: 2022-01-14 | End: 2022-09-20

## 2022-01-17 PROCEDURE — 87635 SARS-COV-2 COVID-19 AMP PRB: CPT | Performed by: NURSE PRACTITIONER

## 2022-03-07 ENCOUNTER — OFFICE VISIT (OUTPATIENT)
Dept: FAMILY MEDICINE CLINIC | Facility: CLINIC | Age: 37
End: 2022-03-07

## 2022-03-07 VITALS
WEIGHT: 144.6 LBS | DIASTOLIC BLOOD PRESSURE: 90 MMHG | HEART RATE: 72 BPM | HEIGHT: 66 IN | BODY MASS INDEX: 23.24 KG/M2 | SYSTOLIC BLOOD PRESSURE: 121 MMHG | OXYGEN SATURATION: 99 %

## 2022-03-07 DIAGNOSIS — F33.0 MILD EPISODE OF RECURRENT MAJOR DEPRESSIVE DISORDER: ICD-10-CM

## 2022-03-07 DIAGNOSIS — M62.838 TRAPEZIUS MUSCLE SPASM: ICD-10-CM

## 2022-03-07 DIAGNOSIS — I10 ESSENTIAL HYPERTENSION: Primary | ICD-10-CM

## 2022-03-07 PROCEDURE — 99213 OFFICE O/P EST LOW 20 MIN: CPT | Performed by: FAMILY MEDICINE

## 2022-03-07 RX ORDER — CYCLOBENZAPRINE HCL 5 MG
5 TABLET ORAL 3 TIMES DAILY PRN
Qty: 90 TABLET | Refills: 0 | OUTPATIENT
Start: 2022-03-07 | End: 2022-10-05

## 2022-03-07 RX ORDER — IBUPROFEN 600 MG/1
600 TABLET ORAL EVERY 6 HOURS PRN
Qty: 120 TABLET | Refills: 0 | OUTPATIENT
Start: 2022-03-07 | End: 2022-10-05

## 2022-03-07 NOTE — PROGRESS NOTES
"Chief Complaint  Hypertension    Subjective          Ligia Vargas presents to [facility] [place]  History of Present Illness    Patient seen today for follow-up hypertension.  Notes that blood pressure is doing well.  She is compliant with her medications.  Taking losartan 25 mg daily and amlodipine 10 mg daily.  Patient notes that medications have a bitter taste that she does not care for.  Patient has had mid back pain, slightly to the right in upper thoracic region.  Notes that it is a burning.  Worse with certain positions.  Patient does a lot of repetitive movement (twisting, etc.) at work.  She generally uses a hot bath/shower, and this helps some.  Patient does not like to use medications if not needed.    Patient says that her depression symptoms are doing okay.  She has not been taking her Zoloft, and feels like she does not need it right now.  Patient does have increased stress between work and expectations from other people..  Patient has to care for her children, and has a young son with chronic medical condition.  She does not have a lot of time to take care of herself.      Objective   Vital Signs:   /90 (BP Location: Left arm)   Pulse 72   Ht 167.6 cm (66\")   Wt 65.6 kg (144 lb 9.6 oz)   SpO2 99%   BMI 23.34 kg/m²     Physical Exam  Vitals reviewed.   Constitutional:       General: She is not in acute distress.     Appearance: She is well-developed.   Cardiovascular:      Rate and Rhythm: Normal rate and regular rhythm.      Heart sounds: Normal heart sounds. No murmur heard.  Pulmonary:      Effort: Pulmonary effort is normal. No respiratory distress.      Breath sounds: Normal breath sounds. No wheezing or rales.   Musculoskeletal:        Back:       Comments: Patient has muscular tenderness in the upper thoracic/lower cervical regions of back, right side worse than left   Skin:     General: Skin is warm and dry.      Findings: No " rash.   Neurological:      Mental Status: She is alert and oriented to person, place, and time.        Result Review :   The following data was reviewed by: Peggy Gaines MD on 03/07/2022:  Common labs    Common Labsle 10/27/21 10/27/21    1650 1650   Glucose  82   BUN  8   Creatinine  0.66   eGFR African Am  124   Sodium  140   Potassium  4.0   Chloride  104   Calcium  9.5   Albumin  4.80   Total Bilirubin  0.3   Alkaline Phosphatase  61   AST (SGOT)  19   ALT (SGPT)  13   WBC 7.45    Hemoglobin 13.1    Hematocrit 40.4    Platelets 261                       Assessment and Plan    Diagnoses and all orders for this visit:    1. Essential hypertension (Primary)    2. Trapezius muscle spasm  -     ibuprofen (ADVIL,MOTRIN) 600 MG tablet; Take 1 tablet by mouth Every 6 (Six) Hours As Needed for Mild Pain .  Dispense: 120 tablet; Refill: 0  -     cyclobenzaprine (FLEXERIL) 5 MG tablet; Take 1 tablet by mouth 3 (Three) Times a Day As Needed for Muscle Spasms.  Dispense: 90 tablet; Refill: 0    3. 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  Recommended conservative measures like heat, massage, topical analgesics and TENS unit  Ibuprofen and Flexeril as needed  Depression symptoms controlled without medication at this time  Discussed the importance of taking care of herself when she can be the best caretaker others          Follow Up   Return in about 3 months (around 6/7/2022) for Recheck.  Patient was given instructions and counseling regarding her condition or for health maintenance advice. Please see specific information pulled into the AVS if appropriate.         This document has been electronically signed by  Peggy Gaines MD

## 2022-03-17 ENCOUNTER — PATIENT MESSAGE (OUTPATIENT)
Dept: FAMILY MEDICINE CLINIC | Facility: CLINIC | Age: 37
End: 2022-03-17

## 2022-03-17 RX ORDER — SUMATRIPTAN 25 MG/1
25 TABLET, FILM COATED ORAL ONCE
Qty: 9 TABLET | Refills: 0 | Status: SHIPPED | OUTPATIENT
Start: 2022-03-17 | End: 2022-04-17

## 2022-03-22 ENCOUNTER — OFFICE VISIT (OUTPATIENT)
Dept: FAMILY MEDICINE CLINIC | Facility: CLINIC | Age: 37
End: 2022-03-22

## 2022-03-22 VITALS
HEIGHT: 66 IN | SYSTOLIC BLOOD PRESSURE: 120 MMHG | BODY MASS INDEX: 22.82 KG/M2 | OXYGEN SATURATION: 99 % | DIASTOLIC BLOOD PRESSURE: 80 MMHG | HEART RATE: 105 BPM | WEIGHT: 142 LBS

## 2022-03-22 DIAGNOSIS — G43.919 INTRACTABLE MIGRAINE WITHOUT STATUS MIGRAINOSUS, UNSPECIFIED MIGRAINE TYPE: Primary | ICD-10-CM

## 2022-03-22 PROCEDURE — 99213 OFFICE O/P EST LOW 20 MIN: CPT | Performed by: FAMILY MEDICINE

## 2022-03-22 PROCEDURE — 96372 THER/PROPH/DIAG INJ SC/IM: CPT | Performed by: FAMILY MEDICINE

## 2022-03-22 RX ORDER — KETOROLAC TROMETHAMINE 30 MG/ML
30 INJECTION, SOLUTION INTRAMUSCULAR; INTRAVENOUS ONCE
Status: COMPLETED | OUTPATIENT
Start: 2022-03-22 | End: 2022-03-22

## 2022-03-22 RX ORDER — RIZATRIPTAN BENZOATE 10 MG/1
10 TABLET ORAL ONCE AS NEEDED
Qty: 9 TABLET | Refills: 2 | Status: SHIPPED | OUTPATIENT
Start: 2022-03-22 | End: 2022-06-07

## 2022-03-22 RX ORDER — AMITRIPTYLINE HYDROCHLORIDE 10 MG/1
10 TABLET, FILM COATED ORAL NIGHTLY
Qty: 30 TABLET | Refills: 2 | Status: SHIPPED | OUTPATIENT
Start: 2022-03-22 | End: 2022-04-13 | Stop reason: SDUPTHER

## 2022-03-22 RX ADMIN — KETOROLAC TROMETHAMINE 30 MG: 30 INJECTION, SOLUTION INTRAMUSCULAR; INTRAVENOUS at 11:30

## 2022-04-13 DIAGNOSIS — G43.919 INTRACTABLE MIGRAINE WITHOUT STATUS MIGRAINOSUS, UNSPECIFIED MIGRAINE TYPE: ICD-10-CM

## 2022-04-13 RX ORDER — AMITRIPTYLINE HYDROCHLORIDE 10 MG/1
10 TABLET, FILM COATED ORAL NIGHTLY
Qty: 90 TABLET | Refills: 3 | OUTPATIENT
Start: 2022-04-13 | End: 2022-10-05

## 2022-04-22 DIAGNOSIS — I10 ESSENTIAL HYPERTENSION: ICD-10-CM

## 2022-04-22 RX ORDER — LOSARTAN POTASSIUM 25 MG/1
TABLET ORAL
Qty: 90 TABLET | Refills: 1 | Status: SHIPPED | OUTPATIENT
Start: 2022-04-22 | End: 2022-09-14

## 2022-05-02 PROCEDURE — 87660 TRICHOMONAS VAGIN DIR PROBE: CPT | Performed by: NURSE PRACTITIONER

## 2022-05-02 PROCEDURE — 87510 GARDNER VAG DNA DIR PROBE: CPT | Performed by: NURSE PRACTITIONER

## 2022-05-02 PROCEDURE — 87661 TRICHOMONAS VAGINALIS AMPLIF: CPT | Performed by: NURSE PRACTITIONER

## 2022-05-02 PROCEDURE — 87591 N.GONORRHOEAE DNA AMP PROB: CPT | Performed by: NURSE PRACTITIONER

## 2022-05-02 PROCEDURE — 87491 CHLMYD TRACH DNA AMP PROBE: CPT | Performed by: NURSE PRACTITIONER

## 2022-05-02 PROCEDURE — 87480 CANDIDA DNA DIR PROBE: CPT | Performed by: NURSE PRACTITIONER

## 2022-06-06 ENCOUNTER — LAB (OUTPATIENT)
Dept: LAB | Facility: HOSPITAL | Age: 37
End: 2022-06-06

## 2022-06-06 ENCOUNTER — OFFICE VISIT (OUTPATIENT)
Dept: FAMILY MEDICINE CLINIC | Facility: CLINIC | Age: 37
End: 2022-06-06

## 2022-06-06 VITALS
OXYGEN SATURATION: 100 % | HEIGHT: 66 IN | BODY MASS INDEX: 23.14 KG/M2 | WEIGHT: 144 LBS | DIASTOLIC BLOOD PRESSURE: 86 MMHG | SYSTOLIC BLOOD PRESSURE: 128 MMHG | HEART RATE: 88 BPM

## 2022-06-06 DIAGNOSIS — H53.8 BLURRY VISION: ICD-10-CM

## 2022-06-06 DIAGNOSIS — G43.009 MIGRAINE WITHOUT AURA AND WITHOUT STATUS MIGRAINOSUS, NOT INTRACTABLE: Primary | ICD-10-CM

## 2022-06-06 DIAGNOSIS — I10 ESSENTIAL HYPERTENSION: ICD-10-CM

## 2022-06-06 PROCEDURE — 84443 ASSAY THYROID STIM HORMONE: CPT

## 2022-06-06 PROCEDURE — 36415 COLL VENOUS BLD VENIPUNCTURE: CPT

## 2022-06-06 PROCEDURE — 85025 COMPLETE CBC W/AUTO DIFF WBC: CPT

## 2022-06-06 PROCEDURE — 83036 HEMOGLOBIN GLYCOSYLATED A1C: CPT

## 2022-06-06 PROCEDURE — 99214 OFFICE O/P EST MOD 30 MIN: CPT | Performed by: FAMILY MEDICINE

## 2022-06-06 PROCEDURE — 80053 COMPREHEN METABOLIC PANEL: CPT

## 2022-06-06 NOTE — PROGRESS NOTES
"Chief Complaint  Migraine    Subjective    History of Present Illness {CC  Problem List  Visit  Diagnosis   Encounters  Notes  Medications  Labs  Result Review Imaging  Media :23}     Ligia Vargas presents to [facility] [place] for     Chief Complaint   Patient presents with   • Migraine      Patient seen today for follow up.  Continues to have migraine headaches.  Notes that the frequency has decreased some, however says that headache comes \"out of nowhere\" and often is difficult to get rid of.  Patient has tried Ubrelvy, which is helpful.  She is currently taking amitriptyline, maxalt and trial of ubrelvy.  In the past for migraines, patient has taken topamax and propranolol without improvement.      Current Outpatient Medications:   •  amitriptyline (ELAVIL) 10 MG tablet, Take 1 tablet by mouth Every Night., Disp: 90 tablet, Rfl: 3  •  amLODIPine (NORVASC) 10 MG tablet, TAKE 1 TABLET BY MOUTH EVERY DAY, Disp: 90 tablet, Rfl: 3  •  cholecalciferol (VITAMIN D3) 25 MCG (1000 UT) tablet, TAKE 1 TABLET BY MOUTH EVERY DAY, Disp: 90 tablet, Rfl: 1  •  cyclobenzaprine (FLEXERIL) 5 MG tablet, Take 1 tablet by mouth 3 (Three) Times a Day As Needed for Muscle Spasms., Disp: 90 tablet, Rfl: 0  •  gabapentin (NEURONTIN) 100 MG capsule, Take 1 capsule by mouth 3 (Three) Times a Day., Disp: 90 capsule, Rfl: 0  •  ibuprofen (ADVIL,MOTRIN) 600 MG tablet, Take 1 tablet by mouth Every 6 (Six) Hours As Needed for Mild Pain ., Disp: 120 tablet, Rfl: 0  •  loratadine (CLARITIN) 10 MG tablet, Take 1 tablet by mouth Daily., Disp: 30 tablet, Rfl: 0  •  losartan (COZAAR) 25 MG tablet, TAKE 1 TABLET BY MOUTH EVERY DAY, Disp: 90 tablet, Rfl: 1  •  Pediatric Multivit-Minerals-C (EQL IMMUNITY C GUMMIES CHILD PO), Take  by mouth., Disp: , Rfl:   •  rizatriptan (Maxalt) 10 MG tablet, Take 1 tablet by mouth 1 (One) Time As Needed for Migraine for up to 1 dose. May repeat in 2 " "hours if needed, Disp: 9 tablet, Rfl: 2  •  ubrogepant (ubrogepant) 100 MG tablet, Take 1 tablet by mouth 1 (One) Time As Needed (migraine) for up to 1 dose., Disp: 1 tablet, Rfl: 0     Objective       Vital Signs:   /86   Pulse 88   Ht 167.6 cm (66\")   Wt 65.3 kg (144 lb)   SpO2 100%   BMI 23.24 kg/m²     Physical Exam  Vitals reviewed.   Constitutional:       General: She is not in acute distress.     Appearance: She is well-developed.   Cardiovascular:      Rate and Rhythm: Normal rate and regular rhythm.      Heart sounds: Normal heart sounds. No murmur heard.  Pulmonary:      Effort: Pulmonary effort is normal. No respiratory distress.      Breath sounds: Normal breath sounds. No wheezing or rales.   Skin:     General: Skin is warm and dry.   Neurological:      Mental Status: She is alert and oriented to person, place, and time.        Result Review :{ Labs  Result Review  Imaging  Med Tab  Media :23}   The following data was reviewed by: Peggy Gaines MD on 06/06/2022    Common labs    Common Labsle 10/27/21 10/27/21    1650 1650   Glucose  82   BUN  8   Creatinine  0.66   eGFR African Am  124   Sodium  140   Potassium  4.0   Chloride  104   Calcium  9.5   Albumin  4.80   Total Bilirubin  0.3   Alkaline Phosphatase  61   AST (SGOT)  19   ALT (SGPT)  13   WBC 7.45    Hemoglobin 13.1    Hematocrit 40.4    Platelets 261                    Assessment and Plan {CC Problem List  Visit Diagnosis  ROS  Review (Popup)  Health Maintenance  Quality  BestPractice  Medications  SmartSets  SnapShot Encounters  Media :23}   Diagnoses and all orders for this visit:    1. Migraine without aura and without status migrainosus, not intractable (Primary)  -     galcanezumab-gnlm (Emgality) 120 MG/ML auto-injector pen; Inject 1 mL under the skin into the appropriate area as directed Every 30 (Thirty) Days.  Dispense: 1 mL; Refill: 5  -     ubrogepant (ubrogepant) 100 MG tablet; Take 1 tablet by mouth " 1 (One) Time As Needed (migraine) for up to 10 doses.  Dispense: 10 tablet; Refill: 5  -     galcanezumab-gnlm (Emgality) 120 MG/ML auto-injector pen; Inject 2 mL under the skin into the appropriate area as directed 1 (One) Time for 1 dose. Lot: [id number], Exp: 02/14/2024  Dispense: 2 pen; Refill: 0    2. Essential hypertension  -     Hemoglobin A1c; Future  -     TSH; Future  -     Comprehensive Metabolic Panel; Future  -     CBC & Differential; Future    3. Blurry vision  -     Hemoglobin A1c; Future  -     TSH; Future  -     Comprehensive Metabolic Panel; Future  -     CBC & Differential; Future       Migraines not well controlled  Start Emgality, risks and benefits of medication discussed  MA instructed on proper use of auto injector today  Continue amitriptyline for now, with plans to discontinue once migraines controlled  Ubrelvy as needed for breakthrough migraines    Hypertension controlled, continue current medication  Labs as above for monitoring and to check for contributing factors for blurry vision  Recommended eye exam      Follow Up {Instructions Charge Capture  Follow-up Communications :23}   Return in about 3 months (around 9/6/2022) for Recheck.  Patient was given instructions and counseling regarding her condition or for health maintenance advice. Please see specific information pulled into the AVS if appropriate.            This document has been electronically signed by Peggy Gaines MD

## 2022-06-07 ENCOUNTER — TELEPHONE (OUTPATIENT)
Dept: FAMILY MEDICINE CLINIC | Facility: CLINIC | Age: 37
End: 2022-06-07

## 2022-06-07 LAB
ALBUMIN SERPL-MCNC: 4.2 G/DL (ref 3.5–5.2)
ALBUMIN/GLOB SERPL: 1.8 G/DL
ALP SERPL-CCNC: 56 U/L (ref 39–117)
ALT SERPL W P-5'-P-CCNC: 11 U/L (ref 1–33)
ANION GAP SERPL CALCULATED.3IONS-SCNC: 9 MMOL/L (ref 5–15)
AST SERPL-CCNC: 12 U/L (ref 1–32)
BASOPHILS # BLD AUTO: 0.03 10*3/MM3 (ref 0–0.2)
BASOPHILS NFR BLD AUTO: 0.3 % (ref 0–1.5)
BILIRUB SERPL-MCNC: 0.2 MG/DL (ref 0–1.2)
BUN SERPL-MCNC: 11 MG/DL (ref 6–20)
BUN/CREAT SERPL: 14.9 (ref 7–25)
CALCIUM SPEC-SCNC: 9 MG/DL (ref 8.6–10.5)
CHLORIDE SERPL-SCNC: 107 MMOL/L (ref 98–107)
CO2 SERPL-SCNC: 25 MMOL/L (ref 22–29)
CREAT SERPL-MCNC: 0.74 MG/DL (ref 0.57–1)
DEPRECATED RDW RBC AUTO: 38.5 FL (ref 37–54)
EGFRCR SERPLBLD CKD-EPI 2021: 107.7 ML/MIN/1.73
EOSINOPHIL # BLD AUTO: 0.28 10*3/MM3 (ref 0–0.4)
EOSINOPHIL NFR BLD AUTO: 3.2 % (ref 0.3–6.2)
ERYTHROCYTE [DISTWIDTH] IN BLOOD BY AUTOMATED COUNT: 11.3 % (ref 12.3–15.4)
GLOBULIN UR ELPH-MCNC: 2.4 GM/DL
GLUCOSE SERPL-MCNC: 103 MG/DL (ref 65–99)
HBA1C MFR BLD: 5.1 % (ref 4.8–5.6)
HCT VFR BLD AUTO: 38.5 % (ref 34–46.6)
HGB BLD-MCNC: 12.9 G/DL (ref 12–15.9)
IMM GRANULOCYTES # BLD AUTO: 0.02 10*3/MM3 (ref 0–0.05)
IMM GRANULOCYTES NFR BLD AUTO: 0.2 % (ref 0–0.5)
LYMPHOCYTES # BLD AUTO: 2.66 10*3/MM3 (ref 0.7–3.1)
LYMPHOCYTES NFR BLD AUTO: 30.9 % (ref 19.6–45.3)
MCH RBC QN AUTO: 31.3 PG (ref 26.6–33)
MCHC RBC AUTO-ENTMCNC: 33.5 G/DL (ref 31.5–35.7)
MCV RBC AUTO: 93.4 FL (ref 79–97)
MONOCYTES # BLD AUTO: 0.63 10*3/MM3 (ref 0.1–0.9)
MONOCYTES NFR BLD AUTO: 7.3 % (ref 5–12)
NEUTROPHILS NFR BLD AUTO: 5 10*3/MM3 (ref 1.7–7)
NEUTROPHILS NFR BLD AUTO: 58.1 % (ref 42.7–76)
NRBC BLD AUTO-RTO: 0 /100 WBC (ref 0–0.2)
PLATELET # BLD AUTO: 274 10*3/MM3 (ref 140–450)
PMV BLD AUTO: 10.7 FL (ref 6–12)
POTASSIUM SERPL-SCNC: 4 MMOL/L (ref 3.5–5.2)
PROT SERPL-MCNC: 6.6 G/DL (ref 6–8.5)
RBC # BLD AUTO: 4.12 10*6/MM3 (ref 3.77–5.28)
SODIUM SERPL-SCNC: 141 MMOL/L (ref 136–145)
TSH SERPL DL<=0.05 MIU/L-ACNC: 1.06 UIU/ML (ref 0.27–4.2)
WBC NRBC COR # BLD: 8.62 10*3/MM3 (ref 3.4–10.8)

## 2022-06-07 RX ORDER — GALCANEZUMAB 120 MG/ML
120 INJECTION, SOLUTION SUBCUTANEOUS
Qty: 1 ML | Refills: 5 | Status: SHIPPED | OUTPATIENT
Start: 2022-06-07 | End: 2022-09-09 | Stop reason: SDUPTHER

## 2022-06-07 RX ORDER — GALCANEZUMAB 120 MG/ML
240 INJECTION, SOLUTION SUBCUTANEOUS ONCE
Qty: 2 PEN | Refills: 0 | COMMUNITY
Start: 2022-06-07 | End: 2022-06-07

## 2022-06-07 NOTE — TELEPHONE ENCOUNTER
Per Dr. Gaines, Ms. Vargas has been called with recent lab results & recommendations.  Continue current medications and follow-up as planned or sooner if any problems.       ----- Message from Peggy Gaines MD sent at 6/7/2022  3:00 PM CDT -----  Labs ok.  ThanksDENG

## 2022-06-09 NOTE — PLAN OF CARE
"[facility]            SUBJECTIVE:     Rigoberto Mallory is a 25 Brenda Ramos DO PGY-4  [facility] Fellow 2021-22      " Problem: Labor (Cervical Ripen, Induct, Augment) (Adult,Obstetrics,Pediatric)  Goal: Signs and Symptoms of Listed Potential Problems Will be Absent or Manageable (Labor)  Outcome: Outcome(s) achieved Date Met:  09/19/17 09/19/17 0315   Labor (Cervical Ripen, Induct, Augment)   Problems Assessed (Labor) all   Problems Present (Labor) pain

## 2022-08-01 PROCEDURE — 87635 SARS-COV-2 COVID-19 AMP PRB: CPT | Performed by: FAMILY MEDICINE

## 2022-09-09 ENCOUNTER — OFFICE VISIT (OUTPATIENT)
Dept: FAMILY MEDICINE CLINIC | Facility: CLINIC | Age: 37
End: 2022-09-09

## 2022-09-09 VITALS
WEIGHT: 132 LBS | SYSTOLIC BLOOD PRESSURE: 140 MMHG | BODY MASS INDEX: 21.21 KG/M2 | HEIGHT: 66 IN | HEART RATE: 71 BPM | DIASTOLIC BLOOD PRESSURE: 120 MMHG | OXYGEN SATURATION: 100 %

## 2022-09-09 DIAGNOSIS — I10 ESSENTIAL HYPERTENSION: ICD-10-CM

## 2022-09-09 DIAGNOSIS — G43.009 MIGRAINE WITHOUT AURA AND WITHOUT STATUS MIGRAINOSUS, NOT INTRACTABLE: Primary | ICD-10-CM

## 2022-09-09 PROCEDURE — 99213 OFFICE O/P EST LOW 20 MIN: CPT | Performed by: FAMILY MEDICINE

## 2022-09-09 RX ORDER — GALCANEZUMAB 120 MG/ML
120 INJECTION, SOLUTION SUBCUTANEOUS
Qty: 1 ML | Refills: 5 | COMMUNITY
Start: 2022-09-09 | End: 2023-03-13

## 2022-09-13 DIAGNOSIS — I10 ESSENTIAL HYPERTENSION: ICD-10-CM

## 2022-09-14 RX ORDER — LOSARTAN POTASSIUM 25 MG/1
TABLET ORAL
Qty: 90 TABLET | Refills: 1 | Status: SHIPPED | OUTPATIENT
Start: 2022-09-14

## 2022-09-16 ENCOUNTER — TELEPHONE (OUTPATIENT)
Dept: FAMILY MEDICINE CLINIC | Facility: CLINIC | Age: 37
End: 2022-09-16

## 2022-09-16 NOTE — TELEPHONE ENCOUNTER
I have called and talked with the patient and told her not to take any more of the new script she just filles on 09/13/2022.  I did tell her to contact the pharmacy and talk with them about a possible change in the medication.   I did tell her to continue her current losartan script.     I told her that this script was sent back in January of 2022.  So she should have been on the 10 mg all along.     She said I guess I was just finishing out the 5 mg tablets.     I did not know if you wanted her to go back to the Norvasc 5 mg or send in a new script for 5 mg with dosing instructions to take 2 tables daily.     SUNNY Persaud      ----- Message from Ligia Vargas sent at 9/16/2022  1:51 PM CDT -----  Regarding: Possible allergic reaction   Where you meaning to prescribe me the 10 mg amlodipine instead of 5? I took a 10 mg yesterday and I had a allergic reaction.  Broke out in hives. Urgent care prescribed me a antihistamine to relieve the itching. Could it be possible that I had a reaction to the 10? There also two different brands.  The 5 mg is by Monograph, the 10 mg is by Norvasc.

## 2022-09-20 ENCOUNTER — TELEPHONE (OUTPATIENT)
Dept: FAMILY MEDICINE CLINIC | Facility: CLINIC | Age: 37
End: 2022-09-20

## 2022-09-20 RX ORDER — AMLODIPINE BESYLATE 5 MG/1
10 TABLET ORAL DAILY
Qty: 60 TABLET | Refills: 5 | Status: SHIPPED | OUTPATIENT
Start: 2022-09-20 | End: 2023-03-29

## 2022-09-20 NOTE — TELEPHONE ENCOUNTER
New Script sent for the Norvasc 5 mg, Take 2 tablets daily in the morning.   Patient had a possible allergic reaction to additives/coloring in the 10 mg tablet that was dispensed by [facility] pharmacy.  Patient states she had no problem taking the 5 mg tablet.      ----- Message from Ligia Vargas sent at 9/20/2022  4:19 PM CDT -----  Regarding: Possible allergic reaction   Yes. [facility]

## 2022-10-05 ENCOUNTER — TELEPHONE (OUTPATIENT)
Dept: FAMILY MEDICINE CLINIC | Facility: CLINIC | Age: 37
End: 2022-10-05

## 2022-10-05 NOTE — TELEPHONE ENCOUNTER
Patient came by and needed to see if you could extend her FMLA to 4 days for migraines. Phone is [phone]

## 2022-10-05 NOTE — TELEPHONE ENCOUNTER
Need more information.  4 days a month?  Please ask her to bring paperwork with her to next visit on 10/28/2022.  If she is having headaches this frequently on Emgality, she likely needs to see a neurologist.  Please find out where she would like to see one, [facility]?  Thanks, DENG Gaines

## 2022-10-06 ENCOUNTER — TELEPHONE (OUTPATIENT)
Dept: FAMILY MEDICINE CLINIC | Facility: CLINIC | Age: 37
End: 2022-10-06

## 2022-10-06 DIAGNOSIS — G43.009 MIGRAINE WITHOUT AURA AND WITHOUT STATUS MIGRAINOSUS, NOT INTRACTABLE: Primary | ICD-10-CM

## 2022-10-06 NOTE — TELEPHONE ENCOUNTER
When patient was at urgent care looks like they marked amitriptyline off her list.  Please find out if she has stopped taking this medication.  It can be used along with the Emgality to help control migraine headaches better.  Thanks, DENG Gaines

## 2022-10-28 ENCOUNTER — OFFICE VISIT (OUTPATIENT)
Dept: FAMILY MEDICINE CLINIC | Facility: CLINIC | Age: 37
End: 2022-10-28

## 2022-10-28 VITALS
OXYGEN SATURATION: 100 % | HEART RATE: 74 BPM | BODY MASS INDEX: 20.41 KG/M2 | WEIGHT: 127 LBS | HEIGHT: 66 IN | SYSTOLIC BLOOD PRESSURE: 120 MMHG | DIASTOLIC BLOOD PRESSURE: 80 MMHG

## 2022-10-28 DIAGNOSIS — E61.1 IRON DEFICIENCY: ICD-10-CM

## 2022-10-28 DIAGNOSIS — I10 ESSENTIAL HYPERTENSION: Primary | ICD-10-CM

## 2022-10-28 DIAGNOSIS — E55.9 VITAMIN D DEFICIENCY: ICD-10-CM

## 2022-10-28 DIAGNOSIS — G43.009 MIGRAINE WITHOUT AURA AND WITHOUT STATUS MIGRAINOSUS, NOT INTRACTABLE: ICD-10-CM

## 2022-10-28 PROCEDURE — 99214 OFFICE O/P EST MOD 30 MIN: CPT | Performed by: FAMILY MEDICINE

## 2022-10-28 RX ORDER — AMITRIPTYLINE HYDROCHLORIDE 10 MG/1
TABLET, FILM COATED ORAL
COMMUNITY
Start: 2022-10-18 | End: 2022-10-28

## 2023-03-01 ENCOUNTER — LAB (OUTPATIENT)
Dept: LAB | Facility: HOSPITAL | Age: 38
End: 2023-03-01
Payer: COMMERCIAL

## 2023-03-01 DIAGNOSIS — E55.9 VITAMIN D DEFICIENCY: ICD-10-CM

## 2023-03-01 DIAGNOSIS — I10 ESSENTIAL HYPERTENSION: ICD-10-CM

## 2023-03-01 DIAGNOSIS — E61.1 IRON DEFICIENCY: ICD-10-CM

## 2023-03-01 PROCEDURE — 82728 ASSAY OF FERRITIN: CPT

## 2023-03-01 PROCEDURE — 83540 ASSAY OF IRON: CPT

## 2023-03-01 PROCEDURE — 80053 COMPREHEN METABOLIC PANEL: CPT

## 2023-03-01 PROCEDURE — 36415 COLL VENOUS BLD VENIPUNCTURE: CPT

## 2023-03-01 PROCEDURE — 80061 LIPID PANEL: CPT

## 2023-03-01 PROCEDURE — 84466 ASSAY OF TRANSFERRIN: CPT

## 2023-03-01 PROCEDURE — 82306 VITAMIN D 25 HYDROXY: CPT

## 2023-03-01 PROCEDURE — 85025 COMPLETE CBC W/AUTO DIFF WBC: CPT

## 2023-03-02 LAB
25(OH)D3 SERPL-MCNC: 16.8 NG/ML (ref 30–100)
ALBUMIN SERPL-MCNC: 4.6 G/DL (ref 3.5–5.2)
ALBUMIN/GLOB SERPL: 1.6 G/DL
ALP SERPL-CCNC: 55 U/L (ref 39–117)
ALT SERPL W P-5'-P-CCNC: 13 U/L (ref 1–33)
ANION GAP SERPL CALCULATED.3IONS-SCNC: 10.6 MMOL/L (ref 5–15)
AST SERPL-CCNC: 23 U/L (ref 1–32)
BASOPHILS # BLD AUTO: 0.04 10*3/MM3 (ref 0–0.2)
BASOPHILS NFR BLD AUTO: 0.5 % (ref 0–1.5)
BILIRUB SERPL-MCNC: 0.4 MG/DL (ref 0–1.2)
BUN SERPL-MCNC: 10 MG/DL (ref 6–20)
BUN/CREAT SERPL: 16.4 (ref 7–25)
CALCIUM SPEC-SCNC: 9.6 MG/DL (ref 8.6–10.5)
CHLORIDE SERPL-SCNC: 100 MMOL/L (ref 98–107)
CHOLEST SERPL-MCNC: 176 MG/DL (ref 0–200)
CO2 SERPL-SCNC: 27.4 MMOL/L (ref 22–29)
CREAT SERPL-MCNC: 0.61 MG/DL (ref 0.57–1)
DEPRECATED RDW RBC AUTO: 38.7 FL (ref 37–54)
EGFRCR SERPLBLD CKD-EPI 2021: 119 ML/MIN/1.73
EOSINOPHIL # BLD AUTO: 0.13 10*3/MM3 (ref 0–0.4)
EOSINOPHIL NFR BLD AUTO: 1.6 % (ref 0.3–6.2)
ERYTHROCYTE [DISTWIDTH] IN BLOOD BY AUTOMATED COUNT: 11.4 % (ref 12.3–15.4)
FERRITIN SERPL-MCNC: 75.2 NG/ML (ref 13–150)
GLOBULIN UR ELPH-MCNC: 2.9 GM/DL
GLUCOSE SERPL-MCNC: 96 MG/DL (ref 65–99)
HCT VFR BLD AUTO: 37.5 % (ref 34–46.6)
HDLC SERPL-MCNC: 59 MG/DL (ref 40–60)
HGB BLD-MCNC: 12.9 G/DL (ref 12–15.9)
IMM GRANULOCYTES # BLD AUTO: 0.02 10*3/MM3 (ref 0–0.05)
IMM GRANULOCYTES NFR BLD AUTO: 0.2 % (ref 0–0.5)
IRON 24H UR-MRATE: 89 MCG/DL (ref 37–145)
IRON SATN MFR SERPL: 25 % (ref 20–50)
LDLC SERPL CALC-MCNC: 106 MG/DL (ref 0–100)
LDLC/HDLC SERPL: 1.79 {RATIO}
LYMPHOCYTES # BLD AUTO: 2.96 10*3/MM3 (ref 0.7–3.1)
LYMPHOCYTES NFR BLD AUTO: 36.4 % (ref 19.6–45.3)
MCH RBC QN AUTO: 31.8 PG (ref 26.6–33)
MCHC RBC AUTO-ENTMCNC: 34.4 G/DL (ref 31.5–35.7)
MCV RBC AUTO: 92.4 FL (ref 79–97)
MONOCYTES # BLD AUTO: 0.49 10*3/MM3 (ref 0.1–0.9)
MONOCYTES NFR BLD AUTO: 6 % (ref 5–12)
NEUTROPHILS NFR BLD AUTO: 4.49 10*3/MM3 (ref 1.7–7)
NEUTROPHILS NFR BLD AUTO: 55.3 % (ref 42.7–76)
NRBC BLD AUTO-RTO: 0 /100 WBC (ref 0–0.2)
PLATELET # BLD AUTO: 269 10*3/MM3 (ref 140–450)
PMV BLD AUTO: 10.6 FL (ref 6–12)
POTASSIUM SERPL-SCNC: 3.9 MMOL/L (ref 3.5–5.2)
PROT SERPL-MCNC: 7.5 G/DL (ref 6–8.5)
RBC # BLD AUTO: 4.06 10*6/MM3 (ref 3.77–5.28)
SODIUM SERPL-SCNC: 138 MMOL/L (ref 136–145)
TIBC SERPL-MCNC: 353 MCG/DL (ref 298–536)
TRANSFERRIN SERPL-MCNC: 237 MG/DL (ref 200–360)
TRIGL SERPL-MCNC: 58 MG/DL (ref 0–150)
VLDLC SERPL-MCNC: 11 MG/DL (ref 5–40)
WBC NRBC COR # BLD: 8.13 10*3/MM3 (ref 3.4–10.8)

## 2023-03-03 ENCOUNTER — OFFICE VISIT (OUTPATIENT)
Dept: FAMILY MEDICINE CLINIC | Facility: CLINIC | Age: 38
End: 2023-03-03
Payer: COMMERCIAL

## 2023-03-03 VITALS
WEIGHT: 122 LBS | DIASTOLIC BLOOD PRESSURE: 80 MMHG | HEART RATE: 85 BPM | HEIGHT: 66 IN | SYSTOLIC BLOOD PRESSURE: 120 MMHG | OXYGEN SATURATION: 99 % | BODY MASS INDEX: 19.61 KG/M2

## 2023-03-03 DIAGNOSIS — E55.9 VITAMIN D DEFICIENCY: ICD-10-CM

## 2023-03-03 DIAGNOSIS — I10 ESSENTIAL HYPERTENSION: Primary | ICD-10-CM

## 2023-03-03 DIAGNOSIS — G43.009 MIGRAINE WITHOUT AURA AND WITHOUT STATUS MIGRAINOSUS, NOT INTRACTABLE: ICD-10-CM

## 2023-03-03 DIAGNOSIS — Z23 NEED FOR INFLUENZA VACCINATION: ICD-10-CM

## 2023-03-03 DIAGNOSIS — R73.09 ELEVATED GLUCOSE: ICD-10-CM

## 2023-03-03 PROCEDURE — 99214 OFFICE O/P EST MOD 30 MIN: CPT | Performed by: FAMILY MEDICINE

## 2023-03-03 PROCEDURE — 90686 IIV4 VACC NO PRSV 0.5 ML IM: CPT | Performed by: FAMILY MEDICINE

## 2023-03-03 PROCEDURE — 90471 IMMUNIZATION ADMIN: CPT | Performed by: FAMILY MEDICINE

## 2023-03-03 RX ORDER — ERGOCALCIFEROL 1.25 MG/1
50000 CAPSULE ORAL WEEKLY
Qty: 4 CAPSULE | Refills: 2 | Status: SHIPPED | OUTPATIENT
Start: 2023-03-03

## 2023-03-03 NOTE — PROGRESS NOTES
"Chief Complaint  Hypertension    Subjective    History of Present Illness {  Problem List  Visit  Diagnosis   Encounters  Notes  Medications  Labs  Result Review Imaging  Media :23}     Ligia Vargas presents to [facility] [place] for     Chief Complaint   Patient presents with   • Hypertension      Patient seen today for hypertension follow up.  Taking losartan 25 mg daily and amlodipine 10 mg daily.  Has been doing ok since last visit.  No concerns with blood pressure.  Migraine headaches controlled with Emgality and ubrelvy.  Patient does have some headaches (triggered by situational stress at home/work).        Current Outpatient Medications:   •  amLODIPine (NORVASC) 5 MG tablet, Take 2 tablets by mouth Daily., Disp: 60 tablet, Rfl: 5  •  galcanezumab-gnlm (Emgality) 120 MG/ML auto-injector pen, Inject 1 mL under the skin into the appropriate area as directed Every 30 (Thirty) Days., Disp: 1 mL, Rfl: 5  •  hydrOXYzine (ATARAX) 25 MG tablet, May take 1-2 tablets by mouth every 6 hours as needed for itching., Disp: 40 tablet, Rfl: 0  •  losartan (COZAAR) 25 MG tablet, TAKE 1 TABLET BY MOUTH EVERY DAY, Disp: 90 tablet, Rfl: 1  •  ondansetron (ZOFRAN) 4 MG tablet, Take 1 tablet by mouth Every 8 (Eight) Hours As Needed for Nausea or Vomiting., Disp: 12 tablet, Rfl: 0  •  ubrogepant (ubrogepant) 100 MG tablet, Take 1 tablet by mouth 1 (One) Time As Needed (migraine) for up to 10 doses., Disp: 10 tablet, Rfl: 5  •  cholecalciferol (VITAMIN D3) 25 MCG (1000 UT) tablet, TAKE 1 TABLET BY MOUTH EVERY DAY, Disp: 90 tablet, Rfl: 1     Objective       Vital Signs:   /80   Pulse 85   Ht 167.6 cm (66\")   Wt 55.3 kg (122 lb)   SpO2 99%   BMI 19.69 kg/m²     Physical Exam  Vitals reviewed.   Constitutional:       General: She is not in acute distress.     Appearance: She is well-developed.   Cardiovascular:      Rate and Rhythm: Normal rate and " regular rhythm.      Heart sounds: Normal heart sounds. No murmur heard.  Pulmonary:      Effort: Pulmonary effort is normal. No respiratory distress.      Breath sounds: Normal breath sounds. No wheezing or rales.   Skin:     General: Skin is warm and dry.   Neurological:      Mental Status: She is alert and oriented to person, place, and time.        Result Review :{ Labs  Result Review  Imaging  Med Tab  Media :23}   The following data was reviewed by: Peggy Gaines MD on 03/03/2023    Common labs    Common Labs 6/6/22 3/1/23 3/1/23 3/1/23    1649 1558 1558 1558   Glucose    96   BUN    10   Creatinine    0.61   Sodium    138   Potassium    3.9   Chloride    100   Calcium    9.6   Albumin    4.6   Total Bilirubin    0.4   Alkaline Phosphatase    55   AST (SGOT)    23   ALT (SGPT)    13   WBC  8.13     Hemoglobin  12.9     Hematocrit  37.5     Platelets  269     Total Cholesterol   176    Triglycerides   58    HDL Cholesterol   59    LDL Cholesterol    106 (A)    Hemoglobin A1C 5.10      (A) Abnormal value                      Assessment and Plan {CC Problem List  Visit Diagnosis  ROS  Review (Popup)  Health Maintenance  Quality  BestPractice  Medications  SmartSets  SnapShot Encounters  Media :23}   Diagnoses and all orders for this visit:    1. Essential hypertension (Primary)    2. Migraine without aura and without status migrainosus, not intractable    3. Elevated glucose  -     Hemoglobin A1c; Future    4. Vitamin D deficiency  -     vitamin D (ERGOCALCIFEROL) 1.25 MG (73309 UT) capsule capsule; Take 1 capsule by mouth 1 (One) Time Per Week.  Dispense: 4 capsule; Refill: 2    5. Need for influenza vaccination  -     FluLaval/Fluzone >6 mos ([id number])      Patient seen today for follow up  Most recent labs reviewed during visit  Hypertension controlled, continue current medication  Migraine headaches controlled, continue current medication  Elevated glucose on previous lab, will check  HgbA1c  Family history of type 1 diabetes in young son  Vitamin D deficiency - supplement provided  Due for influenza vaccine      Follow Up {Instructions Charge Capture  Follow-up Communications :23}   Return in about 4 months (around 7/3/2023) for Recheck.  Patient was given instructions and counseling regarding her condition or for health maintenance advice. Please see specific information pulled into the AVS if appropriate.            This document has been electronically signed by Peggy Gaines MD

## 2023-03-12 DIAGNOSIS — G43.009 MIGRAINE WITHOUT AURA AND WITHOUT STATUS MIGRAINOSUS, NOT INTRACTABLE: ICD-10-CM

## 2023-03-13 RX ORDER — GALCANEZUMAB 120 MG/ML
120 INJECTION, SOLUTION SUBCUTANEOUS
Qty: 1 EACH | Refills: 5 | Status: SHIPPED | OUTPATIENT
Start: 2023-03-13

## 2023-03-29 RX ORDER — AMLODIPINE BESYLATE 5 MG/1
10 TABLET ORAL DAILY
Qty: 180 TABLET | Refills: 1 | Status: SHIPPED | OUTPATIENT
Start: 2023-03-29

## 2023-04-13 PROBLEM — J34.89 SINUS PRESSURE: Status: ACTIVE | Noted: 2023-04-13

## 2023-04-13 PROBLEM — R53.83 DECREASED ENERGY: Status: ACTIVE | Noted: 2023-04-13

## 2023-05-26 DIAGNOSIS — E55.9 VITAMIN D DEFICIENCY: ICD-10-CM

## 2023-05-26 RX ORDER — ERGOCALCIFEROL 1.25 MG/1
CAPSULE ORAL
Qty: 4 CAPSULE | Refills: 2 | Status: SHIPPED | OUTPATIENT
Start: 2023-05-26

## 2023-05-26 NOTE — TELEPHONE ENCOUNTER
Next Appt  With Family Medicine (Peggy Gaines MD)  07/03/2023 at 4:00 PM     Please let patient know her creatinine is a little elevated, how is she doing?    Gabapentin Pregnancy And Lactation Text: This medication is Pregnancy Category C and isn't considered safe during pregnancy. It is excreted in breast milk.

## 2023-07-14 ENCOUNTER — OFFICE VISIT (OUTPATIENT)
Dept: FAMILY MEDICINE CLINIC | Facility: CLINIC | Age: 38
End: 2023-07-14
Payer: COMMERCIAL

## 2023-07-14 VITALS
OXYGEN SATURATION: 99 % | WEIGHT: 121 LBS | HEART RATE: 77 BPM | SYSTOLIC BLOOD PRESSURE: 110 MMHG | DIASTOLIC BLOOD PRESSURE: 72 MMHG | BODY MASS INDEX: 19.44 KG/M2 | HEIGHT: 66 IN

## 2023-07-14 DIAGNOSIS — I10 ESSENTIAL HYPERTENSION: Primary | ICD-10-CM

## 2023-07-14 DIAGNOSIS — E55.9 VITAMIN D DEFICIENCY: ICD-10-CM

## 2023-07-14 DIAGNOSIS — K29.00 ACUTE GASTRITIS WITHOUT HEMORRHAGE, UNSPECIFIED GASTRITIS TYPE: ICD-10-CM

## 2023-07-14 DIAGNOSIS — G43.009 MIGRAINE WITHOUT AURA AND WITHOUT STATUS MIGRAINOSUS, NOT INTRACTABLE: ICD-10-CM

## 2023-07-14 DIAGNOSIS — K59.00 CONSTIPATION, UNSPECIFIED CONSTIPATION TYPE: ICD-10-CM

## 2023-07-14 PROCEDURE — 99214 OFFICE O/P EST MOD 30 MIN: CPT | Performed by: FAMILY MEDICINE

## 2023-07-14 RX ORDER — ERGOCALCIFEROL 1.25 MG/1
50000 CAPSULE ORAL
Qty: 4 CAPSULE | Refills: 2 | Status: SHIPPED | OUTPATIENT
Start: 2023-07-14

## 2023-07-14 RX ORDER — POLYETHYLENE GLYCOL 3350 17 G/17G
17 POWDER, FOR SOLUTION ORAL DAILY PRN
Qty: 1 EACH | Refills: 2 | Status: SHIPPED | OUTPATIENT
Start: 2023-07-14

## 2023-07-14 RX ORDER — FAMOTIDINE 20 MG/1
20 TABLET, FILM COATED ORAL 2 TIMES DAILY
Qty: 60 TABLET | Refills: 2 | Status: SHIPPED | OUTPATIENT
Start: 2023-07-14

## 2023-07-30 NOTE — PROGRESS NOTES
Chief Complaint  Hypertension    Subjective    History of Present Illness {  Problem List  Visit  Diagnosis   Encounters  Notes  Medications  Labs  Result Review Imaging  Media :23}     Ligia Vargas presents to [facility] [place] for     Chief Complaint   Patient presents with    Hypertension      Patient seen today for follow up.  Has chronic medical concerns including hypertension, migraine headaches and vitamin D deficiency.  Hypertension controlled, adherent to medication regimen.  Migraine headaches, manageable.   She has been having more trouble with epigastric pain.        Current Outpatient Medications:     amLODIPine (NORVASC) 5 MG tablet, TAKE 2 TABLETS BY MOUTH DAILY, Disp: 180 tablet, Rfl: 1    Emgality 120 MG/ML auto-injector pen, INJECT 1 ML UNDER THE SKIN INTO THE APPROPRIATE AREA AS DIRECTED EVERY 30 (THIRTY) DAYS., Disp: 1 each, Rfl: 5    hydrOXYzine (ATARAX) 25 MG tablet, May take 1-2 tablets by mouth every 6 hours as needed for itching., Disp: 40 tablet, Rfl: 0    losartan (COZAAR) 25 MG tablet, TAKE 1 TABLET BY MOUTH EVERY DAY, Disp: 90 tablet, Rfl: 1    ondansetron (ZOFRAN) 4 MG tablet, Take 1 tablet by mouth Every 8 (Eight) Hours As Needed for Nausea or Vomiting., Disp: 12 tablet, Rfl: 0    ubrogepant (ubrogepant) 100 MG tablet, Take 1 tablet by mouth 1 (One) Time As Needed (migraine) for up to 10 doses., Disp: 10 tablet, Rfl: 5    vitamin D (ERGOCALCIFEROL) 1.25 MG (07680 UT) capsule capsule, Take 1 capsule by mouth Every 7 (Seven) Days., Disp: 4 capsule, Rfl: 2    Wheat Dextrin (Benefiber) powder, Two teaspoons PO twice a day., Disp: 529 g, Rfl: 0    famotidine (Pepcid) 20 MG tablet, Take 1 tablet by mouth 2 (Two) Times a Day., Disp: 60 tablet, Rfl: 2    polyethylene glycol (MIRALAX) 17 g packet, Take 17 g by mouth Daily As Needed (constipation)., Disp: 1 each, Rfl: 2     Objective       Vital Signs:   /72   Pulse 77  "  Ht 167.6 cm (66\")   Wt 54.9 kg (121 lb)   SpO2 99%   BMI 19.53 kg/m²     Physical Exam  Vitals reviewed.   Constitutional:       General: She is not in acute distress.     Appearance: She is well-developed.   Cardiovascular:      Rate and Rhythm: Normal rate and regular rhythm.      Heart sounds: Normal heart sounds. No murmur heard.  Pulmonary:      Effort: Pulmonary effort is normal. No respiratory distress.      Breath sounds: Normal breath sounds. No wheezing or rales.   Abdominal:      General: Bowel sounds are normal.      Palpations: Abdomen is soft. There is no mass.      Tenderness: There is no abdominal tenderness. There is no guarding or rebound.   Skin:     General: Skin is warm and dry.   Neurological:      Mental Status: She is alert and oriented to person, place, and time.      Result Review :{ Labs  Result Review  Imaging  Med Tab  Media :23}   The following data was reviewed by: Peggy Gaines MD on 07/14/2023    Common labs          3/1/2023    15:58   Common Labs   Glucose 96    BUN 10    Creatinine 0.61    Sodium 138    Potassium 3.9    Chloride 100    Calcium 9.6    Albumin 4.6    Total Bilirubin 0.4    Alkaline Phosphatase 55    AST (SGOT) 23    ALT (SGPT) 13    WBC 8.13    Hemoglobin 12.9    Hematocrit 37.5    Platelets 269    Total Cholesterol 176    Triglycerides 58    HDL Cholesterol 59    LDL Cholesterol  106                  Assessment and Plan {CC Problem List  Visit Diagnosis  ROS  Review (Popup)  Health Maintenance  Quality  BestPractice  Medications  SmartSets  SnapShot Encounters  Media :23}   Diagnoses and all orders for this visit:    1. Essential hypertension (Primary)    2. Migraine without aura and without status migrainosus, not intractable    3. Acute gastritis without hemorrhage, unspecified gastritis type  -     famotidine (Pepcid) 20 MG tablet; Take 1 tablet by mouth 2 (Two) Times a Day.  Dispense: 60 tablet; Refill: 2    4. Constipation, " unspecified constipation type  -     polyethylene glycol (MIRALAX) 17 g packet; Take 17 g by mouth Daily As Needed (constipation).  Dispense: 1 each; Refill: 2    5. Vitamin D deficiency  -     vitamin D (ERGOCALCIFEROL) 1.25 MG (31326 UT) capsule capsule; Take 1 capsule by mouth Every 7 (Seven) Days.  Dispense: 4 capsule; Refill: 2       Patient seen for follow up  Hypertension controlled, continue current medication  Migraine headaches manageable, continue current medication  Having symptoms consistent with gastritis - recommend trial of Pepcid 20 mg twice daily for 1-2 months  Also having some constipation - Miralax PRN, use scheduled if needed      Follow Up {Instructions Charge Capture  Follow-up Communications :23}   Return in about 3 months (around 10/14/2023) for Recheck.  Patient was given instructions and counseling regarding her condition or for health maintenance advice. Please see specific information pulled into the AVS if appropriate.          This document has been electronically signed by Peggy Gaines MD

## 2023-08-16 ENCOUNTER — OFFICE VISIT (OUTPATIENT)
Dept: FAMILY MEDICINE CLINIC | Facility: CLINIC | Age: 38
End: 2023-08-16
Payer: COMMERCIAL

## 2023-08-16 VITALS
DIASTOLIC BLOOD PRESSURE: 80 MMHG | WEIGHT: 128 LBS | HEART RATE: 78 BPM | BODY MASS INDEX: 20.57 KG/M2 | HEIGHT: 66 IN | SYSTOLIC BLOOD PRESSURE: 120 MMHG | OXYGEN SATURATION: 100 %

## 2023-08-16 DIAGNOSIS — F41.9 ANXIETY: Primary | ICD-10-CM

## 2023-08-16 DIAGNOSIS — G43.009 MIGRAINE WITHOUT AURA AND WITHOUT STATUS MIGRAINOSUS, NOT INTRACTABLE: ICD-10-CM

## 2023-08-16 DIAGNOSIS — E55.9 VITAMIN D DEFICIENCY: ICD-10-CM

## 2023-08-16 DIAGNOSIS — I10 ESSENTIAL HYPERTENSION: ICD-10-CM

## 2023-08-16 PROCEDURE — 99214 OFFICE O/P EST MOD 30 MIN: CPT | Performed by: FAMILY MEDICINE

## 2023-08-16 NOTE — PROGRESS NOTES
Chief Complaint  Anxiety    Subjective    History of Present Illness {CC  Problem List  Visit  Diagnosis   Encounters  Notes  Medications  Labs  Result Review Imaging  Media :23}     Ligia Vargas presents to [facility] [place] for     Chief Complaint   Patient presents with    Anxiety      Patient seen today for follow up.  Has chronic medical problems including hypertension, migraine headaches and constipation.  Has not been doing well since last visit.  Having increased anxiety symptoms, some situational/some chronic.  She is not using anything for anxiety at this time.  She is not sleeping well due to multiple factors.  Since she has children with medical concerns. Blood pressure controlled, adherent to medication regimen.  Still getting frequent headaches, taking Emgality and Ubrelvy.        Current Outpatient Medications:     amLODIPine (NORVASC) 5 MG tablet, TAKE 2 TABLETS BY MOUTH DAILY, Disp: 180 tablet, Rfl: 1    Emgality 120 MG/ML auto-injector pen, INJECT 1 ML UNDER THE SKIN INTO THE APPROPRIATE AREA AS DIRECTED EVERY 30 (THIRTY) DAYS., Disp: 1 each, Rfl: 5    famotidine (Pepcid) 20 MG tablet, Take 1 tablet by mouth 2 (Two) Times a Day., Disp: 60 tablet, Rfl: 2    hydrOXYzine (ATARAX) 25 MG tablet, May take 1-2 tablets by mouth every 6 hours as needed for itching., Disp: 40 tablet, Rfl: 0    losartan (COZAAR) 25 MG tablet, TAKE 1 TABLET BY MOUTH EVERY DAY, Disp: 90 tablet, Rfl: 1    ondansetron (ZOFRAN) 4 MG tablet, Take 1 tablet by mouth Every 8 (Eight) Hours As Needed for Nausea or Vomiting., Disp: 12 tablet, Rfl: 0    polyethylene glycol (MIRALAX) 17 g packet, Take 17 g by mouth Daily As Needed (constipation)., Disp: 1 each, Rfl: 2    ubrogepant (ubrogepant) 100 MG tablet, Take 1 tablet by mouth 1 (One) Time As Needed (migraine) for up to 10 doses., Disp: 10 tablet, Rfl: 5    vitamin D (ERGOCALCIFEROL) 1.25 MG (62959 UT) capsule  "capsule, Take 1 capsule by mouth Every 7 (Seven) Days., Disp: 4 capsule, Rfl: 2    Wheat Dextrin (Benefiber) powder, Two teaspoons PO twice a day., Disp: 529 g, Rfl: 0     Objective       Vital Signs:   /80   Pulse 78   Ht 167.6 cm (66\")   Wt 58.1 kg (128 lb)   SpO2 100%   BMI 20.66 kg/mý     Physical Exam  Vitals reviewed.   Constitutional:       General: She is not in acute distress.     Appearance: She is well-developed.   Cardiovascular:      Rate and Rhythm: Normal rate and regular rhythm.      Heart sounds: Normal heart sounds. No murmur heard.  Pulmonary:      Effort: Pulmonary effort is normal. No respiratory distress.      Breath sounds: Normal breath sounds. No wheezing or rales.   Skin:     General: Skin is warm and dry.   Neurological:      Mental Status: She is alert and oriented to person, place, and time.        Result Review :{ Labs  Result Review  Imaging  Med Tab  Media :23}   The following data was reviewed by: Peggy Gaines MD on 08/16/2023    Common labs          3/1/2023    15:58   Common Labs   Glucose 96    BUN 10    Creatinine 0.61    Sodium 138    Potassium 3.9    Chloride 100    Calcium 9.6    Albumin 4.6    Total Bilirubin 0.4    Alkaline Phosphatase 55    AST (SGOT) 23    ALT (SGPT) 13    WBC 8.13    Hemoglobin 12.9    Hematocrit 37.5    Platelets 269    Total Cholesterol 176    Triglycerides 58    HDL Cholesterol 59    LDL Cholesterol  106                Assessment and Plan {CC Problem List  Visit Diagnosis  ROS  Review (Popup)  Health Maintenance  Quality  BestPractice  Medications  SmartSets  SnapShot Encounters  Media :23}   Diagnoses and all orders for this visit:    1. Anxiety (Primary)  -     busPIRone (BUSPAR) 5 MG tablet; Take 1 tablet by mouth 2 (Two) Times a Day.  Dispense: 60 tablet; Refill: 5    2. Essential hypertension    3. Migraine without aura and without status migrainosus, not intractable    4. Vitamin D deficiency       Patient seen " today for follow up  Anxiety symptoms not well controlled  Trial buspar, start with 5 mg twice daily - may need to increase dose  Hypertension controlled, continue current medication  Migraine headaches, continue current medication  Hope that improvement in anxiety may limit some headache symptoms  Vitamin D deficiency, continue 50,000 units weekly      Follow Up {Instructions Charge Capture  Follow-up Communications :23}   Return in about 3 months (around 11/16/2023) for Recheck.  Patient was given instructions and counseling regarding her condition or for health maintenance advice. Please see specific information pulled into the AVS if appropriate.          This document has been electronically signed by Peggy Gaines MD

## 2023-08-24 ENCOUNTER — TELEPHONE (OUTPATIENT)
Dept: FAMILY MEDICINE CLINIC | Facility: CLINIC | Age: 38
End: 2023-08-24
Payer: COMMERCIAL

## 2023-08-24 PROCEDURE — 87635 SARS-COV-2 COVID-19 AMP PRB: CPT | Performed by: NURSE PRACTITIONER

## 2023-08-24 NOTE — TELEPHONE ENCOUNTER
Patient came by to drop off FMLA paper and wanted to let Dr. Gaines know she did not get the refill for Buspar. Patient stated that is what she thought the name of the medication was. Please advise [phone]    [facility]

## 2023-08-25 ENCOUNTER — TELEPHONE (OUTPATIENT)
Dept: FAMILY MEDICINE CLINIC | Facility: CLINIC | Age: 38
End: 2023-08-25
Payer: COMMERCIAL

## 2023-08-25 RX ORDER — BUSPIRONE HYDROCHLORIDE 5 MG/1
5 TABLET ORAL 2 TIMES DAILY
Qty: 60 TABLET | Refills: 5 | Status: SHIPPED | OUTPATIENT
Start: 2023-08-25

## 2023-08-25 NOTE — TELEPHONE ENCOUNTER
Patient returning missed call. She said if no answer a message can be sent through her [employer]. Please call [phone]

## 2023-09-07 ENCOUNTER — TELEPHONE (OUTPATIENT)
Dept: FAMILY MEDICINE CLINIC | Facility: CLINIC | Age: 38
End: 2023-09-07

## 2023-09-07 NOTE — TELEPHONE ENCOUNTER
Patient was returning your call regarding her [facility] papers. She gets off work at 3:30. Phone is [phone].

## 2023-09-13 ENCOUNTER — TELEPHONE (OUTPATIENT)
Dept: FAMILY MEDICINE CLINIC | Facility: CLINIC | Age: 38
End: 2023-09-13
Payer: COMMERCIAL

## 2023-09-26 DIAGNOSIS — F41.9 ANXIETY: ICD-10-CM

## 2023-09-26 RX ORDER — BUSPIRONE HYDROCHLORIDE 5 MG/1
5 TABLET ORAL 2 TIMES DAILY
Qty: 180 TABLET | Refills: 3 | Status: SHIPPED | OUTPATIENT
Start: 2023-09-26

## 2023-09-27 ENCOUNTER — TELEPHONE (OUTPATIENT)
Dept: FAMILY MEDICINE CLINIC | Facility: CLINIC | Age: 38
End: 2023-09-27
Payer: COMMERCIAL